# Patient Record
Sex: FEMALE | Race: WHITE | NOT HISPANIC OR LATINO | Employment: OTHER | URBAN - NONMETROPOLITAN AREA
[De-identification: names, ages, dates, MRNs, and addresses within clinical notes are randomized per-mention and may not be internally consistent; named-entity substitution may affect disease eponyms.]

---

## 2022-02-07 ENCOUNTER — APPOINTMENT (EMERGENCY)
Dept: RADIOLOGY | Facility: HOSPITAL | Age: 87
DRG: 092 | End: 2022-02-07
Payer: COMMERCIAL

## 2022-02-07 ENCOUNTER — HOSPITAL ENCOUNTER (INPATIENT)
Facility: HOSPITAL | Age: 87
LOS: 5 days | Discharge: NURSING, REHAB OR LONG-TERM CARE | DRG: 092 | End: 2022-02-12
Attending: EMERGENCY MEDICINE | Admitting: STUDENT IN AN ORGANIZED HEALTH CARE EDUCATION/TRAINING PROGRAM
Payer: COMMERCIAL

## 2022-02-07 ENCOUNTER — APPOINTMENT (INPATIENT)
Dept: CT IMAGING | Facility: HOSPITAL | Age: 87
DRG: 092 | End: 2022-02-07
Payer: COMMERCIAL

## 2022-02-07 DIAGNOSIS — R26.2 AMBULATORY DYSFUNCTION: ICD-10-CM

## 2022-02-07 DIAGNOSIS — K21.9 GERD (GASTROESOPHAGEAL REFLUX DISEASE): ICD-10-CM

## 2022-02-07 DIAGNOSIS — J32.9 SINUSITIS: ICD-10-CM

## 2022-02-07 DIAGNOSIS — D50.8 IRON DEFICIENCY ANEMIA SECONDARY TO INADEQUATE DIETARY IRON INTAKE: ICD-10-CM

## 2022-02-07 DIAGNOSIS — M17.11 OSTEOARTHRITIS OF RIGHT KNEE, UNSPECIFIED OSTEOARTHRITIS TYPE: ICD-10-CM

## 2022-02-07 DIAGNOSIS — R29.6 FREQUENT FALLS: Primary | ICD-10-CM

## 2022-02-07 DIAGNOSIS — E83.52 HYPERCALCEMIA: ICD-10-CM

## 2022-02-07 DIAGNOSIS — N39.0 UTI (URINARY TRACT INFECTION): ICD-10-CM

## 2022-02-07 DIAGNOSIS — D64.9 ANEMIA: ICD-10-CM

## 2022-02-07 PROBLEM — R27.0 ATAXIA: Status: ACTIVE | Noted: 2022-02-07

## 2022-02-07 PROBLEM — M48.00 SPINAL STENOSIS: Status: ACTIVE | Noted: 2022-02-07

## 2022-02-07 PROBLEM — F41.9 ANXIETY: Status: ACTIVE | Noted: 2022-02-07

## 2022-02-07 PROBLEM — G40.909 SEIZURE DISORDER (HCC): Status: ACTIVE | Noted: 2022-02-07

## 2022-02-07 PROBLEM — E87.1 HYPONATREMIA: Status: ACTIVE | Noted: 2022-02-07

## 2022-02-07 PROBLEM — D50.9 IRON DEFICIENCY ANEMIA: Status: ACTIVE | Noted: 2022-02-07

## 2022-02-07 LAB
2HR DELTA HS TROPONIN: 0 NG/L
ALBUMIN SERPL BCP-MCNC: 2.5 G/DL (ref 3.5–5)
ALP SERPL-CCNC: 105 U/L (ref 46–116)
ALT SERPL W P-5'-P-CCNC: 19 U/L (ref 12–78)
ANION GAP SERPL CALCULATED.3IONS-SCNC: 9 MMOL/L (ref 4–13)
AST SERPL W P-5'-P-CCNC: 31 U/L (ref 5–45)
BACTERIA UR QL AUTO: ABNORMAL /HPF
BASOPHILS # BLD AUTO: 0.03 THOUSANDS/ÂΜL (ref 0–0.1)
BASOPHILS NFR BLD AUTO: 0 % (ref 0–1)
BILIRUB SERPL-MCNC: 0.27 MG/DL (ref 0.2–1)
BILIRUB UR QL STRIP: NEGATIVE
BUN SERPL-MCNC: 18 MG/DL (ref 5–25)
CALCIUM ALBUM COR SERPL-MCNC: 12.1 MG/DL (ref 8.3–10.1)
CALCIUM SERPL-MCNC: 10.9 MG/DL (ref 8.3–10.1)
CARDIAC TROPONIN I PNL SERPL HS: 3 NG/L
CARDIAC TROPONIN I PNL SERPL HS: 3 NG/L
CHLORIDE SERPL-SCNC: 98 MMOL/L (ref 100–108)
CK SERPL-CCNC: 81 U/L (ref 26–192)
CLARITY UR: ABNORMAL
CO2 SERPL-SCNC: 25 MMOL/L (ref 21–32)
COLOR UR: YELLOW
CREAT SERPL-MCNC: 1.09 MG/DL (ref 0.6–1.3)
EOSINOPHIL # BLD AUTO: 0.08 THOUSAND/ÂΜL (ref 0–0.61)
EOSINOPHIL NFR BLD AUTO: 1 % (ref 0–6)
ERYTHROCYTE [DISTWIDTH] IN BLOOD BY AUTOMATED COUNT: 18.7 % (ref 11.6–15.1)
FLUAV RNA RESP QL NAA+PROBE: NEGATIVE
FLUBV RNA RESP QL NAA+PROBE: NEGATIVE
GFR SERPL CREATININE-BSD FRML MDRD: 44 ML/MIN/1.73SQ M
GLUCOSE SERPL-MCNC: 111 MG/DL (ref 65–140)
GLUCOSE UR STRIP-MCNC: NEGATIVE MG/DL
HCT VFR BLD AUTO: 28.1 % (ref 34.8–46.1)
HGB BLD-MCNC: 7.8 G/DL (ref 11.5–15.4)
HGB UR QL STRIP.AUTO: ABNORMAL
HYALINE CASTS #/AREA URNS LPF: ABNORMAL /LPF
IMM GRANULOCYTES # BLD AUTO: 0.08 THOUSAND/UL (ref 0–0.2)
IMM GRANULOCYTES NFR BLD AUTO: 1 % (ref 0–2)
INR PPP: 1.13 (ref 0.84–1.19)
KETONES UR STRIP-MCNC: NEGATIVE MG/DL
LACTATE SERPL-SCNC: 1.1 MMOL/L (ref 0.5–2)
LEUKOCYTE ESTERASE UR QL STRIP: ABNORMAL
LIPASE SERPL-CCNC: 88 U/L (ref 73–393)
LYMPHOCYTES # BLD AUTO: 2.07 THOUSANDS/ÂΜL (ref 0.6–4.47)
LYMPHOCYTES NFR BLD AUTO: 21 % (ref 14–44)
MAGNESIUM SERPL-MCNC: 2.1 MG/DL (ref 1.6–2.6)
MCH RBC QN AUTO: 19.3 PG (ref 26.8–34.3)
MCHC RBC AUTO-ENTMCNC: 27.8 G/DL (ref 31.4–37.4)
MCV RBC AUTO: 70 FL (ref 82–98)
MONOCYTES # BLD AUTO: 0.78 THOUSAND/ÂΜL (ref 0.17–1.22)
MONOCYTES NFR BLD AUTO: 8 % (ref 4–12)
MUCOUS THREADS UR QL AUTO: ABNORMAL
NEUTROPHILS # BLD AUTO: 6.68 THOUSANDS/ÂΜL (ref 1.85–7.62)
NEUTS SEG NFR BLD AUTO: 69 % (ref 43–75)
NITRITE UR QL STRIP: NEGATIVE
NON-SQ EPI CELLS URNS QL MICRO: ABNORMAL /HPF
NRBC BLD AUTO-RTO: 0 /100 WBCS
OTHER STN SPEC: ABNORMAL
PH UR STRIP.AUTO: 6 [PH]
PLATELET # BLD AUTO: 663 THOUSANDS/UL (ref 149–390)
PMV BLD AUTO: 10.1 FL (ref 8.9–12.7)
POTASSIUM SERPL-SCNC: 4.4 MMOL/L (ref 3.5–5.3)
PROT SERPL-MCNC: 8 G/DL (ref 6.4–8.2)
PROT UR STRIP-MCNC: NEGATIVE MG/DL
PROTHROMBIN TIME: 14.4 SECONDS (ref 11.6–14.5)
RBC # BLD AUTO: 4.04 MILLION/UL (ref 3.81–5.12)
RBC #/AREA URNS AUTO: ABNORMAL /HPF
RSV RNA RESP QL NAA+PROBE: NEGATIVE
SARS-COV-2 RNA RESP QL NAA+PROBE: NEGATIVE
SODIUM SERPL-SCNC: 132 MMOL/L (ref 136–145)
SP GR UR STRIP.AUTO: 1.02 (ref 1–1.03)
TSH SERPL DL<=0.05 MIU/L-ACNC: 1.64 UIU/ML (ref 0.36–3.74)
URINE COMMENT: ABNORMAL
UROBILINOGEN UR QL STRIP.AUTO: 0.2 E.U./DL
WBC # BLD AUTO: 9.72 THOUSAND/UL (ref 4.31–10.16)
WBC #/AREA URNS AUTO: ABNORMAL /HPF

## 2022-02-07 PROCEDURE — 96365 THER/PROPH/DIAG IV INF INIT: CPT

## 2022-02-07 PROCEDURE — 99223 1ST HOSP IP/OBS HIGH 75: CPT | Performed by: STUDENT IN AN ORGANIZED HEALTH CARE EDUCATION/TRAINING PROGRAM

## 2022-02-07 PROCEDURE — 83735 ASSAY OF MAGNESIUM: CPT | Performed by: EMERGENCY MEDICINE

## 2022-02-07 PROCEDURE — G1004 CDSM NDSC: HCPCS

## 2022-02-07 PROCEDURE — 81001 URINALYSIS AUTO W/SCOPE: CPT | Performed by: EMERGENCY MEDICINE

## 2022-02-07 PROCEDURE — 73502 X-RAY EXAM HIP UNI 2-3 VIEWS: CPT

## 2022-02-07 PROCEDURE — 96361 HYDRATE IV INFUSION ADD-ON: CPT

## 2022-02-07 PROCEDURE — C9113 INJ PANTOPRAZOLE SODIUM, VIA: HCPCS | Performed by: STUDENT IN AN ORGANIZED HEALTH CARE EDUCATION/TRAINING PROGRAM

## 2022-02-07 PROCEDURE — 82340 ASSAY OF CALCIUM IN URINE: CPT | Performed by: STUDENT IN AN ORGANIZED HEALTH CARE EDUCATION/TRAINING PROGRAM

## 2022-02-07 PROCEDURE — 0241U HB NFCT DS VIR RESP RNA 4 TRGT: CPT | Performed by: EMERGENCY MEDICINE

## 2022-02-07 PROCEDURE — 85025 COMPLETE CBC W/AUTO DIFF WBC: CPT | Performed by: EMERGENCY MEDICINE

## 2022-02-07 PROCEDURE — 87086 URINE CULTURE/COLONY COUNT: CPT | Performed by: EMERGENCY MEDICINE

## 2022-02-07 PROCEDURE — 80053 COMPREHEN METABOLIC PANEL: CPT | Performed by: EMERGENCY MEDICINE

## 2022-02-07 PROCEDURE — 99285 EMERGENCY DEPT VISIT HI MDM: CPT | Performed by: EMERGENCY MEDICINE

## 2022-02-07 PROCEDURE — 99285 EMERGENCY DEPT VISIT HI MDM: CPT

## 2022-02-07 PROCEDURE — 83605 ASSAY OF LACTIC ACID: CPT | Performed by: EMERGENCY MEDICINE

## 2022-02-07 PROCEDURE — 36415 COLL VENOUS BLD VENIPUNCTURE: CPT | Performed by: EMERGENCY MEDICINE

## 2022-02-07 PROCEDURE — 85610 PROTHROMBIN TIME: CPT | Performed by: EMERGENCY MEDICINE

## 2022-02-07 PROCEDURE — 84443 ASSAY THYROID STIM HORMONE: CPT | Performed by: EMERGENCY MEDICINE

## 2022-02-07 PROCEDURE — 87040 BLOOD CULTURE FOR BACTERIA: CPT | Performed by: EMERGENCY MEDICINE

## 2022-02-07 PROCEDURE — 70450 CT HEAD/BRAIN W/O DYE: CPT

## 2022-02-07 PROCEDURE — 72100 X-RAY EXAM L-S SPINE 2/3 VWS: CPT

## 2022-02-07 PROCEDURE — 93005 ELECTROCARDIOGRAM TRACING: CPT

## 2022-02-07 PROCEDURE — 84484 ASSAY OF TROPONIN QUANT: CPT | Performed by: EMERGENCY MEDICINE

## 2022-02-07 PROCEDURE — 71045 X-RAY EXAM CHEST 1 VIEW: CPT

## 2022-02-07 PROCEDURE — 82550 ASSAY OF CK (CPK): CPT | Performed by: EMERGENCY MEDICINE

## 2022-02-07 PROCEDURE — 83690 ASSAY OF LIPASE: CPT | Performed by: EMERGENCY MEDICINE

## 2022-02-07 PROCEDURE — 73564 X-RAY EXAM KNEE 4 OR MORE: CPT

## 2022-02-07 RX ORDER — CEFTRIAXONE 1 G/50ML
1000 INJECTION, SOLUTION INTRAVENOUS ONCE
Status: COMPLETED | OUTPATIENT
Start: 2022-02-07 | End: 2022-02-07

## 2022-02-07 RX ORDER — ERGOCALCIFEROL 1.25 MG/1
50000 CAPSULE ORAL WEEKLY
COMMUNITY
End: 2022-02-12 | Stop reason: HOSPADM

## 2022-02-07 RX ORDER — PANTOPRAZOLE SODIUM 40 MG/1
40 TABLET, DELAYED RELEASE ORAL
Status: DISCONTINUED | OUTPATIENT
Start: 2022-02-08 | End: 2022-02-12 | Stop reason: HOSPADM

## 2022-02-07 RX ORDER — TRAMADOL HYDROCHLORIDE 50 MG/1
50 TABLET ORAL EVERY 12 HOURS PRN
COMMUNITY
End: 2022-03-20 | Stop reason: HOSPADM

## 2022-02-07 RX ORDER — SODIUM CHLORIDE 9 MG/ML
3 INJECTION, SOLUTION INTRAMUSCULAR; INTRAVENOUS; SUBCUTANEOUS
Status: DISCONTINUED | OUTPATIENT
Start: 2022-02-07 | End: 2022-02-12 | Stop reason: HOSPADM

## 2022-02-07 RX ORDER — LORAZEPAM 0.5 MG/1
0.5 TABLET ORAL EVERY 8 HOURS PRN
Status: ON HOLD | COMMUNITY
End: 2022-03-20 | Stop reason: SDUPTHER

## 2022-02-07 RX ORDER — FERROUS SULFATE 325(65) MG
325 TABLET ORAL
Status: DISCONTINUED | OUTPATIENT
Start: 2022-02-08 | End: 2022-02-11

## 2022-02-07 RX ORDER — LISINOPRIL 10 MG/1
20 TABLET ORAL DAILY
Status: DISCONTINUED | OUTPATIENT
Start: 2022-02-08 | End: 2022-02-12 | Stop reason: HOSPADM

## 2022-02-07 RX ORDER — LEVETIRACETAM 500 MG/1
500 TABLET ORAL
COMMUNITY

## 2022-02-07 RX ORDER — LISINOPRIL 20 MG/1
20 TABLET ORAL DAILY
COMMUNITY

## 2022-02-07 RX ORDER — CEFTRIAXONE 1 G/50ML
1000 INJECTION, SOLUTION INTRAVENOUS EVERY 24 HOURS
Status: DISCONTINUED | OUTPATIENT
Start: 2022-02-08 | End: 2022-02-10

## 2022-02-07 RX ORDER — SENNOSIDES 8.6 MG
1 TABLET ORAL DAILY
Status: DISCONTINUED | OUTPATIENT
Start: 2022-02-08 | End: 2022-02-10

## 2022-02-07 RX ORDER — LORAZEPAM 0.5 MG/1
0.5 TABLET ORAL EVERY 8 HOURS PRN
Status: DISCONTINUED | OUTPATIENT
Start: 2022-02-07 | End: 2022-02-12 | Stop reason: HOSPADM

## 2022-02-07 RX ORDER — ACETAMINOPHEN 325 MG/1
650 TABLET ORAL EVERY 6 HOURS PRN
Status: DISCONTINUED | OUTPATIENT
Start: 2022-02-07 | End: 2022-02-12 | Stop reason: HOSPADM

## 2022-02-07 RX ORDER — LEVETIRACETAM 500 MG/1
500 TABLET ORAL DAILY
Status: DISCONTINUED | OUTPATIENT
Start: 2022-02-08 | End: 2022-02-12 | Stop reason: HOSPADM

## 2022-02-07 RX ORDER — TRAMADOL HYDROCHLORIDE 50 MG/1
50 TABLET ORAL EVERY 6 HOURS PRN
Status: DISCONTINUED | OUTPATIENT
Start: 2022-02-07 | End: 2022-02-07

## 2022-02-07 RX ORDER — PANTOPRAZOLE SODIUM 40 MG/10ML
40 INJECTION, POWDER, LYOPHILIZED, FOR SOLUTION INTRAVENOUS ONCE
Status: COMPLETED | OUTPATIENT
Start: 2022-02-07 | End: 2022-02-07

## 2022-02-07 RX ORDER — FERROUS SULFATE 325(65) MG
325 TABLET ORAL
Status: ON HOLD | COMMUNITY
End: 2022-02-11 | Stop reason: SDUPTHER

## 2022-02-07 RX ORDER — SODIUM CHLORIDE 9 MG/ML
100 INJECTION, SOLUTION INTRAVENOUS CONTINUOUS
Status: DISCONTINUED | OUTPATIENT
Start: 2022-02-07 | End: 2022-02-08

## 2022-02-07 RX ORDER — HEPARIN SODIUM 5000 [USP'U]/ML
5000 INJECTION, SOLUTION INTRAVENOUS; SUBCUTANEOUS EVERY 8 HOURS SCHEDULED
Status: DISCONTINUED | OUTPATIENT
Start: 2022-02-07 | End: 2022-02-07

## 2022-02-07 RX ORDER — LORAZEPAM 0.5 MG/1
0.5 TABLET ORAL EVERY 8 HOURS PRN
Status: DISCONTINUED | OUTPATIENT
Start: 2022-02-07 | End: 2022-02-07

## 2022-02-07 RX ORDER — LANOLIN ALCOHOL/MO/W.PET/CERES
1 CREAM (GRAM) TOPICAL 2 TIMES DAILY
COMMUNITY

## 2022-02-07 RX ORDER — TRAMADOL HYDROCHLORIDE 50 MG/1
50 TABLET ORAL EVERY 6 HOURS PRN
Status: DISCONTINUED | OUTPATIENT
Start: 2022-02-07 | End: 2022-02-12 | Stop reason: HOSPADM

## 2022-02-07 RX ADMIN — SODIUM CHLORIDE 100 ML/HR: 0.9 INJECTION, SOLUTION INTRAVENOUS at 20:24

## 2022-02-07 RX ADMIN — CEFTRIAXONE 1000 MG: 1 INJECTION, SOLUTION INTRAVENOUS at 15:38

## 2022-02-07 RX ADMIN — TRAMADOL HYDROCHLORIDE 50 MG: 50 TABLET, FILM COATED ORAL at 20:22

## 2022-02-07 RX ADMIN — LORAZEPAM 0.5 MG: 0.5 TABLET ORAL at 21:00

## 2022-02-07 RX ADMIN — SODIUM CHLORIDE 500 ML: 0.9 INJECTION, SOLUTION INTRAVENOUS at 13:27

## 2022-02-07 RX ADMIN — PANTOPRAZOLE SODIUM 40 MG: 40 INJECTION, POWDER, FOR SOLUTION INTRAVENOUS at 20:55

## 2022-02-07 RX ADMIN — SODIUM CHLORIDE 1000 ML: 0.9 INJECTION, SOLUTION INTRAVENOUS at 16:30

## 2022-02-07 NOTE — ED PROVIDER NOTES
History  Chief Complaint   Patient presents with    Fall     Pt had multiple falls at home  Pt denies blood thinners or LOC      59-year-old female with a past medical history of hypertension, anemia presents with right hip, right buttocks and right knee pain status post fall at home 30 minutes prior to ED arrival   Patient arrives via EMS who states that patient lives alone  Her son is in the process of trying to find placement for patient as she has been having multiple falls at home recently with ambulatory dysfunction  Last fall was one week ago  Patient states that she wears a brace on her right knee to help her walk better  Today, she got up to go make tea in the kitchen and lost her balance causing her to fall backwards onto her right buttock and right hip  Patient was unable to get herself upright and was crawling to a chair when EMS arrived  Patient was not on the ground for very long per patient  Patient denies focal motor sensory deficit, headache, neck pain, chest pain, abdominal pain  Patient is complaining of right hip, right low back and knee pain but admits that she has a history of chronic right lower extremity pain  Patient denies prodromal symptoms prior to fall  No loss of consciousness  Patient is not on blood thinners  Patient has had her COVID-19 vaccinations and booster         History provided by:  Patient, medical records and EMS personnel   used: No    Fall  Mechanism of injury: fall    Injury location:  Pelvis  Pelvic injury location:  R hip  Incident location:  Home  Time since incident:  30 minutes  Arrived directly from scene: yes    Fall:     Fall occurred:  Standing    Height of fall:  GLF    Impact surface:  Hard floor    Point of impact:  Buttocks    Entrapped after fall: no    Suspicion of alcohol use: no    Suspicion of drug use: no    Tetanus status:  Unknown  Prior to arrival data:     Bystander interventions:  None    Patient ambulatory at scene: no      Blood loss:  None    Responsiveness at scene:  Alert    Orientation at scene:  Person, place, situation and time    Loss of consciousness: no      Amnesic to event: no      Airway interventions:  None    Breathing interventions:  None    IV access status:  None    IO access:  None    Fluids administered:  None    Cardiac interventions:  None    Medications administered:  None    Immobilization:  None    Airway condition since incident:  Stable    Breathing condition since incident:  Stable    Circulation condition since incident:  Stable    Mental status condition since incident:  Stable    Disability condition since incident:  Stable  Associated symptoms: no abdominal pain, no back pain, no blindness, no chest pain, no difficulty breathing, no headaches, no hearing loss, no loss of consciousness, no nausea, no seizures and no vomiting    Risk factors: no AICD, no anticoagulation therapy, no asthma, no beta blocker therapy, no CABG, no CAD, no CHF, no COPD, no diabetes, no dialysis, no hemophilia, no kidney disease, no pacemaker, no past MI and no steroid use        Prior to Admission Medications   Prescriptions Last Dose Informant Patient Reported? Taking?    LORazepam (ATIVAN) 0 5 mg tablet Unknown at Unknown time  Yes No   Sig: Take 0 5 mg by mouth every 8 (eight) hours as needed for anxiety   ergocalciferol (ERGOCALCIFEROL) 1 25 MG (18490 UT) capsule Unknown at Unknown time  Yes No   Sig: Take 50,000 Units by mouth once a week   ferrous sulfate 325 (65 Fe) mg tablet Unknown at Unknown time  Yes No   Sig: Take 325 mg by mouth daily with breakfast   glucosamine-chondroitin 500-400 MG tablet Unknown at Unknown time  Yes No   Sig: Take 1 tablet by mouth 2 (two) times a day   levETIRAcetam (KEPPRA) 500 mg tablet Unknown at Unknown time  Yes No   Sig: Take 500 mg by mouth daily   lisinopril (ZESTRIL) 20 mg tablet Unknown at Unknown time  Yes No   Sig: Take 20 mg by mouth daily   traMADol (ULTRAM) 50 mg tablet Unknown at Unknown time  Yes No   Sig: Take 50 mg by mouth every 6 (six) hours as needed for moderate pain      Facility-Administered Medications: None       Past Medical History:   Diagnosis Date    Arthritis     Hypertension        Past Surgical History:   Procedure Laterality Date    TONSILLECTOMY         History reviewed  No pertinent family history  I have reviewed and agree with the history as documented  E-Cigarette/Vaping    E-Cigarette Use Never User      E-Cigarette/Vaping Substances     Social History     Tobacco Use    Smoking status: Never Smoker    Smokeless tobacco: Never Used   Vaping Use    Vaping Use: Never used   Substance Use Topics    Alcohol use: Never    Drug use: Never       Review of Systems   Constitutional: Negative  HENT: Negative  Negative for hearing loss  Eyes: Negative  Negative for blindness  Respiratory: Negative  Negative for cough and shortness of breath  Cardiovascular: Negative  Negative for chest pain  Gastrointestinal: Negative  Negative for abdominal pain, nausea and vomiting  Endocrine: Negative  Genitourinary: Negative  Negative for difficulty urinating  Musculoskeletal: Positive for arthralgias and gait problem  Negative for back pain, joint swelling, myalgias and neck stiffness  Skin: Negative  Negative for color change and wound  Allergic/Immunologic: Negative  Neurological: Negative for seizures, loss of consciousness and headaches  Hematological: Negative  Psychiatric/Behavioral: Negative  All other systems reviewed and are negative  Physical Exam  Physical Exam  Vitals and nursing note reviewed  Exam conducted with a chaperone present  Constitutional:       General: She is not in acute distress  Appearance: Normal appearance  She is well-developed and normal weight  She is not ill-appearing, toxic-appearing or diaphoretic        Comments: Frail and elderly, in no acute distress   HENT:      Head: Normocephalic and atraumatic  Jaw: There is normal jaw occlusion  Right Ear: Hearing, tympanic membrane, ear canal and external ear normal  There is no impacted cerumen  No mastoid tenderness  No hemotympanum  Left Ear: Hearing, tympanic membrane, ear canal and external ear normal  There is no impacted cerumen  No mastoid tenderness  No hemotympanum  Nose: Nose normal       Right Nostril: No epistaxis  Left Nostril: No epistaxis  Right Sinus: No maxillary sinus tenderness or frontal sinus tenderness  Left Sinus: No maxillary sinus tenderness or frontal sinus tenderness  Mouth/Throat:      Lips: Pink  No lesions  Mouth: Mucous membranes are moist  No lacerations or angioedema  Tongue: No lesions  Tongue does not deviate from midline  Palate: No mass and lesions  Pharynx: Oropharynx is clear  Uvula midline  No pharyngeal swelling, oropharyngeal exudate, posterior oropharyngeal erythema or uvula swelling  Tonsils: No tonsillar exudate or tonsillar abscesses  Eyes:      General: Lids are normal  Vision grossly intact  Gaze aligned appropriately  No visual field deficit or scleral icterus  Right eye: No discharge  Left eye: No discharge  Extraocular Movements: Extraocular movements intact  Right eye: No nystagmus  Left eye: No nystagmus  Conjunctiva/sclera: Conjunctivae normal       Right eye: Right conjunctiva is not injected  Left eye: Left conjunctiva is not injected  Pupils: Pupils are equal, round, and reactive to light  Neck:      Thyroid: No thyroid mass or thyromegaly  Trachea: Trachea and phonation normal    Cardiovascular:      Rate and Rhythm: Normal rate and regular rhythm  Pulses: Normal pulses  Radial pulses are 2+ on the right side and 2+ on the left side  Dorsalis pedis pulses are 2+ on the right side and 2+ on the left side        Heart sounds: Normal heart sounds, S1 normal and S2 normal    Pulmonary:      Effort: Pulmonary effort is normal  No tachypnea, accessory muscle usage, respiratory distress or retractions  Breath sounds: Normal breath sounds and air entry  No stridor or decreased air movement  No decreased breath sounds, wheezing, rhonchi or rales  Chest:      Chest wall: No tenderness  Abdominal:      General: Abdomen is flat  Bowel sounds are normal  There is no distension  Palpations: Abdomen is soft  Abdomen is not rigid  There is no mass  Tenderness: There is no abdominal tenderness  There is no right CVA tenderness, left CVA tenderness, guarding or rebound  Negative signs include Davalos's sign and McBurney's sign  Hernia: No hernia is present  Musculoskeletal:         General: Tenderness present  No swelling, deformity or signs of injury  Cervical back: Normal, full passive range of motion without pain, normal range of motion and neck supple  No spinous process tenderness or muscular tenderness  Thoracic back: Normal       Lumbar back: Tenderness present  No swelling, edema, deformity, signs of trauma, lacerations, spasms or bony tenderness  Normal range of motion  Positive right straight leg raise test  Negative left straight leg raise test  No scoliosis  Back:       Right hip: Tenderness present  No deformity, lacerations, bony tenderness or crepitus  Normal range of motion  Normal strength  Left hip: Normal       Right upper leg: Normal       Left upper leg: Normal       Right knee: No swelling, deformity, effusion, erythema, ecchymosis, lacerations, bony tenderness or crepitus  Normal range of motion  Tenderness present over the patellar tendon  Normal pulse  Instability Tests: Anterior drawer test negative  Posterior drawer test negative  Left knee: Normal       Right lower leg: Normal  No edema  Left lower leg: Normal  No edema        Right ankle: Normal       Right Achilles Tendon: Normal  Left ankle: Normal       Left Achilles Tendon: Normal       Right foot: Normal       Left foot: Normal       Comments: Point tenderness along right sacroiliac joint space; no gross deformity of right lower extremity; full range of motion; motor and sensation intact; no bruising or ecchymosis; point tenderness along right lateral hip and right patella with no swelling; positive right straight leg test    Lymphadenopathy:      Cervical: No cervical adenopathy  Skin:     General: Skin is warm and dry  Capillary Refill: Capillary refill takes less than 2 seconds  Coloration: Skin is not ashen, cyanotic, jaundiced, mottled, pale or sallow  Findings: No abrasion, abscess, acne, bruising, burn, ecchymosis, erythema, signs of injury, laceration, lesion, petechiae, rash or wound  Rash is not macular or papular  Comments: Small abrasion to left knee   Neurological:      General: No focal deficit present  Mental Status: She is alert and oriented to person, place, and time  Mental status is at baseline  She is not disoriented  GCS: GCS eye subscore is 4  GCS verbal subscore is 5  GCS motor subscore is 6  Cranial Nerves: Cranial nerves are intact  No cranial nerve deficit, dysarthria or facial asymmetry  Sensory: Sensation is intact  No sensory deficit  Motor: Motor function is intact  No weakness, tremor, atrophy, abnormal muscle tone or seizure activity  Coordination: Coordination is intact  Coordination normal       Gait: Gait is intact  Gait normal       Comments: Patient is AAOx4, GCS 15; speaking clearly and appropriately; motor and sensation intact; visual fields intact; cranial nerves II-XII grossly intact; no facial droop, slurred speech or arm drift   Psychiatric:         Attention and Perception: Attention and perception normal  She is attentive           Mood and Affect: Mood and affect normal          Speech: Speech normal          Behavior: Behavior normal  Behavior is cooperative  Thought Content:  Thought content normal          Cognition and Memory: Cognition and memory normal          Judgment: Judgment normal          Vital Signs  ED Triage Vitals [02/07/22 1256]   Temperature Pulse Respirations Blood Pressure SpO2   98 4 °F (36 9 °C) 88 16 146/68 99 %      Temp Source Heart Rate Source Patient Position - Orthostatic VS BP Location FiO2 (%)   Temporal Monitor Sitting Right arm --      Pain Score       No Pain           Vitals:    02/07/22 1256 02/07/22 1530 02/07/22 1853 02/08/22 0708   BP: 146/68 142/79 (!) 184/84 129/60   Pulse: 88 87 (!) 106 87   Patient Position - Orthostatic VS: Sitting Sitting           Visual Acuity  Visual Acuity      Most Recent Value   L Pupil Size (mm) 3   R Pupil Size (mm) 3          ED Medications  Medications   sodium chloride (PF) 0 9 % injection 3 mL (has no administration in time range)   sodium chloride 0 9 % infusion (100 mL/hr Intravenous New Bag 2/7/22 2024)   acetaminophen (TYLENOL) tablet 650 mg (has no administration in time range)   senna (SENOKOT) tablet 8 6 mg ( Oral Canceled Entry 2/8/22 0900)   cefTRIAXone (ROCEPHIN) IVPB (premix in dextrose) 1,000 mg 50 mL (has no administration in time range)   pantoprazole (PROTONIX) EC tablet 40 mg (40 mg Oral Given 2/8/22 0602)   ferrous sulfate tablet 325 mg (325 mg Oral Given 2/8/22 0744)   levETIRAcetam (KEPPRA) tablet 500 mg ( Oral Canceled Entry 2/8/22 0900)   lisinopril (ZESTRIL) tablet 20 mg ( Oral Canceled Entry 2/8/22 0900)   LORazepam (ATIVAN) tablet 0 5 mg (0 5 mg Oral Given 2/7/22 2100)   traMADol (ULTRAM) tablet 50 mg (50 mg Oral Given 2/8/22 0656)   sodium chloride 0 9 % bolus 500 mL (0 mL Intravenous Stopped 2/7/22 1537)   cefTRIAXone (ROCEPHIN) IVPB (premix in dextrose) 1,000 mg 50 mL (0 mg Intravenous Stopped 2/7/22 1615)   sodium chloride 0 9 % bolus 1,000 mL (1,000 mL Intravenous New Bag 2/7/22 0446)   pantoprazole (PROTONIX) injection 40 mg (40 mg Intravenous Given 2/7/22 2055)       Diagnostic Studies  Results Reviewed     Procedure Component Value Units Date/Time    Blood culture #1 [[id number]] Collected: 02/07/22 1537    Lab Status: Preliminary result Specimen: Blood from Arm, Right Updated: 02/08/22 0801     Blood Culture Received in Microbiology Lab  Culture in Progress      Comprehensive metabolic panel [[id number]]  (Abnormal) Collected: 02/08/22 0446    Lab Status: Final result Specimen: Blood from Arm, Right Updated: 02/08/22 0511     Sodium 132 mmol/L      Potassium 4 1 mmol/L      Chloride 100 mmol/L      CO2 25 mmol/L      ANION GAP 7 mmol/L      BUN 14 mg/dL      Creatinine 1 03 mg/dL      Glucose 122 mg/dL      Calcium 9 8 mg/dL      Corrected Calcium 11 2 mg/dL      AST 19 U/L      ALT 18 U/L      Alkaline Phosphatase 97 U/L      Total Protein 7 1 g/dL      Albumin 2 2 g/dL      Total Bilirubin 0 24 mg/dL      eGFR 47 ml/min/1 73sq m     Narrative:      [place] guidelines for Chronic Kidney Disease (CKD):     Stage 1 with normal or high GFR (GFR > 90 mL/min/1 73 square meters)    Stage 2 Mild CKD (GFR = 60-89 mL/min/1 73 square meters)    Stage 3A Moderate CKD (GFR = 45-59 mL/min/1 73 square meters)    Stage 3B Moderate CKD (GFR = 30-44 mL/min/1 73 square meters)    Stage 4 Severe CKD (GFR = 15-29 mL/min/1 73 square meters)    Stage 5 End Stage CKD (GFR <15 mL/min/1 73 square meters)  Note: GFR calculation is accurate only with a steady state creatinine    Magnesium [[id number]]  (Normal) Collected: 02/08/22 0446    Lab Status: Final result Specimen: Blood from Arm, Right Updated: 02/08/22 0511     Magnesium 1 9 mg/dL     CBC and differential [[id number]]  (Abnormal) Collected: 02/08/22 0446    Lab Status: Final result Specimen: Blood from Arm, Right Updated: 02/08/22 0455     WBC 11 69 Thousand/uL      RBC 3 67 Million/uL      Hemoglobin 7 1 g/dL      Hematocrit 25 2 %      MCV 69 fL      MCH 19 3 pg      MCHC 28 2 g/dL      RDW 18 6 %      MPV 9 4 fL      Platelets 911 Thousands/uL      nRBC 0 /100 WBCs      Neutrophils Relative 60 %      Immat GRANS % 1 %      Lymphocytes Relative 28 %      Monocytes Relative 10 %      Eosinophils Relative 1 %      Basophils Relative 0 %      Neutrophils Absolute 7 13 Thousands/µL      Immature Grans Absolute 0 08 Thousand/uL      Lymphocytes Absolute 3 21 Thousands/µL      Monocytes Absolute 1 13 Thousand/µL      Eosinophils Absolute 0 11 Thousand/µL      Basophils Absolute 0 03 Thousands/µL     Iron Saturation % [[id number]]     Lab Status: No result Specimen: Blood     Ferritin [[id number]]     Lab Status: No result Specimen: Blood     PTH, intact [[id number]]     Lab Status: No result Specimen: Blood     PTH-related peptide [[id number]]     Lab Status: No result Specimen: Blood     Vitamin D 1,25 dihydroxy [[id number]]     Lab Status: No result Specimen: Blood     Vitamin D 25 hydroxy [[id number]]     Lab Status: No result Specimen: Blood     Calcium, urine, random [[id number]]     Lab Status: No result Specimen: Urine     HS Troponin I 2hr [[id number]]  (Normal) Collected: 02/07/22 1538    Lab Status: Final result Specimen: Blood from Arm, Left Updated: 02/07/22 1622     hs TnI 2hr 3 ng/L      Delta 2hr hsTnI 0 ng/L     Lactic acid, plasma [[id number]]  (Normal) Collected: 02/07/22 1538    Lab Status: Final result Specimen: Blood from Arm, Left Updated: 02/07/22 1621     LACTIC ACID 1 1 mmol/L     Narrative:      Result may be elevated if tourniquet was used during collection  Blood culture #2 [[id number]] Collected: 02/07/22 1537    Lab Status:  In process Specimen: Blood from Arm, Left Updated: 02/07/22 1550    HS Troponin I 4hr [[id number]]     Lab Status: No result Specimen: Blood     COVID/FLU/RSV [[id number]]  (Normal) Collected: 02/07/22 1320    Lab Status: Final result Specimen: Nares from Nose Updated: 02/07/22 1433     SARS-CoV-2 Negative     INFLUENZA A PCR Negative     INFLUENZA B PCR Negative     RSV PCR Negative    Narrative:      FOR PEDIATRIC PATIENTS - copy/paste COVID Guidelines URL to browser: [email or web address]    SARS-CoV-2 assay is a Nucleic Acid Amplification assay intended for the  qualitative detection of nucleic acid from SARS-CoV-2 in nasopharyngeal  swabs  Results are for the presumptive identification of SARS-CoV-2 RNA  Positive results are indicative of infection with SARS-CoV-2, the virus  causing COVID-19, but do not rule out bacterial infection or co-infection  with other viruses  Laboratories within the [place] and its  territories are required to report all positive results to the appropriate  public health authorities  Negative results do not preclude SARS-CoV-2  infection and should not be used as the sole basis for treatment or other  patient management decisions  Negative results must be combined with  clinical observations, patient history, and epidemiological information  This test has not been FDA cleared or approved  This test has been authorized by FDA under an Emergency Use Authorization  (EUA)  This test is only authorized for the duration of time the  declaration that circumstances exist justifying the authorization of the  emergency use of an in vitro diagnostic tests for detection of SARS-CoV-2  virus and/or diagnosis of COVID-19 infection under section 564(b)(1) of  the Act, 21 U  S C  453JAS-1(K)(9), unless the authorization is terminated  or revoked sooner  The test has been validated but independent review by FDA  and CLIA is pending  Test performed using [employer] GeneXpert: This RT-PCR assay targets N2,  a region unique to SARS-CoV-2   A conserved region in the E-gene was chosen  for pan-Sarbecovirus detection which includes SARS-CoV-2     TSH, 3rd generation [[id number]]  (Normal) Collected: 02/07/22 1320    Lab Status: Final result Specimen: Blood from Arm, Left Updated: 02/07/22 7770 [facility]mL     Narrative:      Patients undergoing fluorescein dye angiography may retain small amounts of fluorescein in the body for 48-72 hours post procedure  Samples containing fluorescein can produce falsely depressed TSH values  If the patient had this procedure,a specimen should be resubmitted post fluorescein clearance        HS Troponin 0hr (reflex protocol) [[id number]]  (Normal) Collected: 02/07/22 1320    Lab Status: Final result Specimen: Blood from Arm, Left Updated: 02/07/22 1400     hs TnI 0hr 3 ng/L     Comprehensive metabolic panel [[id number]]  (Abnormal) Collected: 02/07/22 1320    Lab Status: Final result Specimen: Blood from Arm, Left Updated: 02/07/22 1359     Sodium 132 mmol/L      Potassium 4 4 mmol/L      Chloride 98 mmol/L      CO2 25 mmol/L      ANION GAP 9 mmol/L      BUN 18 mg/dL      Creatinine 1 09 mg/dL      Glucose 111 mg/dL      Calcium 10 9 mg/dL      Corrected Calcium 12 1 mg/dL      AST 31 U/L      ALT 19 U/L      Alkaline Phosphatase 105 U/L      Total Protein 8 0 g/dL      Albumin 2 5 g/dL      Total Bilirubin 0 27 mg/dL      eGFR 44 ml/min/1 73sq m     Narrative:      [place] guidelines for Chronic Kidney Disease (CKD):     Stage 1 with normal or high GFR (GFR > 90 mL/min/1 73 square meters)    Stage 2 Mild CKD (GFR = 60-89 mL/min/1 73 square meters)    Stage 3A Moderate CKD (GFR = 45-59 mL/min/1 73 square meters)    Stage 3B Moderate CKD (GFR = 30-44 mL/min/1 73 square meters)    Stage 4 Severe CKD (GFR = 15-29 mL/min/1 73 square meters)    Stage 5 End Stage CKD (GFR <15 mL/min/1 73 square meters)  Note: GFR calculation is accurate only with a steady state creatinine    Lipase [[id number]]  (Normal) Collected: 02/07/22 1320    Lab Status: Final result Specimen: Blood from Arm, Left Updated: 02/07/22 1355     Lipase 88 u/L     Magnesium [[id number]]  (Normal) Collected: 02/07/22 1320    Lab Status: Final result Specimen: Blood from Arm, Left Updated: 02/07/22 1355     Magnesium 2 1 mg/dL     CK (with reflex to MB) [[id number]]  (Normal) Collected: 02/07/22 1320    Lab Status: Final result Specimen: Blood from Arm, Left Updated: 02/07/22 1352     Total CK 81 U/L     Urine Microscopic [[id number]]  (Abnormal) Collected: 02/07/22 1320    Lab Status: Final result Specimen: Urine, Clean Catch Updated: 02/07/22 1350     RBC, UA Innumerable /hpf      WBC, UA 10-20 /hpf      Epithelial Cells Moderate /hpf      Bacteria, UA Innumerable /hpf      Hyaline Casts, UA 0-1 /lpf      OTHER OBSERVATIONS Yeast Cells Present  Transitional Epithelial Cells     MUCUS THREADS Occasional     URINE COMMENT Yeast- Rare    Urine culture [[id number]] Collected: 02/07/22 1320    Lab Status:  In process Specimen: Urine, Clean Catch Updated: 02/07/22 1350    Protime-INR [[id number]]  (Normal) Collected: 02/07/22 1320    Lab Status: Final result Specimen: Blood from Arm, Left Updated: 02/07/22 1346     Protime 14 4 seconds      INR 1 13    UA w Reflex to Microscopic w Reflex to Culture [[id number]]  (Abnormal) Collected: 02/07/22 1320    Lab Status: Final result Specimen: Urine, Clean Catch Updated: 02/07/22 1336     Color, UA Yellow     Clarity, UA Slightly Cloudy     Specific Gravity, UA 1 025     pH, UA 6 0     Leukocytes, UA Moderate     Nitrite, UA Negative     Protein, UA Negative mg/dl      Glucose, UA Negative mg/dl      Ketones, UA Negative mg/dl      Urobilinogen, UA 0 2 E U /dl      Bilirubin, UA Negative     Blood, UA Large    CBC and differential [[id number]]  (Abnormal) Collected: 02/07/22 1320    Lab Status: Final result Specimen: Blood from Arm, Left Updated: 02/07/22 1334     WBC 9 72 Thousand/uL      RBC 4 04 Million/uL      Hemoglobin 7 8 g/dL      Hematocrit 28 1 %      MCV 70 fL      MCH 19 3 pg      MCHC 27 8 g/dL      RDW 18 7 %      MPV 10 1 fL      Platelets 553 Thousands/uL      nRBC 0 /100 WBCs      Neutrophils Relative 69 %      Immat GRANS % 1 %      Lymphocytes Relative 21 %      Monocytes Relative 8 %      Eosinophils Relative 1 %      Basophils Relative 0 %      Neutrophils Absolute 6 68 Thousands/µL      Immature Grans Absolute 0 08 Thousand/uL      Lymphocytes Absolute 2 07 Thousands/µL      Monocytes Absolute 0 78 Thousand/µL      Eosinophils Absolute 0 08 Thousand/µL      Basophils Absolute 0 03 Thousands/µL                  CT head without contrast   Final Result by Jaqueline Celaya MD (02/07 1706)      No acute intracranial abnormality  Acute left maxillary sinusitis  Workstation performed: [id number]         XR chest 1 view portable   Final Result by Mayito Cabello DO (02/07 1536)      No focal consolidation, pleural effusion, or pneumothorax  Workstation performed: [id number]         XR hip/pelv 2-3 vws right if performed   Final Result by Mayito Cabello DO (02/07 1533)      No acute osseous abnormality  Workstation performed: [id number]         XR knee 4+ vw right injury   Final Result by Mayito Cbaello DO (02/07 1602)      No acute osseous abnormality  Degenerative changes as described  Workstation performed: [id number]         XR spine lumbar 2 or 3 views injury   Final Result by Mayito Cabello DO (02/07 1603)      No acute osseous abnormality  Degenerative changes as described  Workstation performed: [id number]                    Procedures  ECG 12 Lead Documentation Only    Date/Time: 2/7/2022 1:20 PM  Performed by: Amado Sweeney MD  Authorized by:  Amado Sweeney MD     Indications / Diagnosis:  Fall  ECG reviewed by me, the ED Provider: yes    Patient location:  ED  Previous ECG:     Comparison to cardiac monitor: Yes    Rate:     ECG rate:  93bpm    ECG rate assessment: normal    Rhythm:     Rhythm: sinus rhythm    Ectopy:     Ectopy: none    QRS:     QRS axis:  Normal  Conduction:     Conduction: normal    ST segments:     ST segments:  Normal  T waves: T waves: flattening and inverted      Flattening:  AVL    Inverted:  AVR  Comments:      No STEMI  MS 170ms  QRS 60ms  QT 422ms    Cardiac monitoring ordered  Heart rate and rhythm as above  I have personally read and interpreted the EKG as above  ED Course  ED Course as of 02/08/22 0845   Mon Feb 07, 2022   1453 COVID-19/influenza negative   1558 CXR:IMPRESSION:     No focal consolidation, pleural effusion, or pneumothorax  1558 Right hip/pelvis xray:IMPRESSION:     No acute osseous abnormality      1558 Lumbar spine x-ray read and interpreted by me  No acute osseous abnormalities  1559 Right knee x-ray read interpreted by me  No acute osseous abnormality  26 Texted Dr Michelle Sterling, Hospitalist for [place] inpatient admission for UTI, frequent falls at home, ambulatory dysfunction, known anemia (no GIB) and hypercalcemia  Son wants social placement as patient lives alone     1615 Lumbar spine xray:IMPRESSION:     No acute osseous abnormality        Degenerative changes as described       1615 Right knee xray:IMPRESSION:     No acute osseous abnormality      Degenerative changes as described       1804 CTH:IMPRESSION:     No acute intracranial abnormality      Acute left maxillary sinusitis              MDM  Number of Diagnoses or Management Options     Amount and/or Complexity of Data Reviewed  Clinical lab tests: ordered and reviewed  Tests in the radiology section of CPT®: ordered and reviewed  Tests in the medicine section of CPT®: reviewed and ordered  Review and summarize past medical records: yes  Discuss the patient with other providers: yes (Hospitalist, Dr Phillip)  Independent visualization of images, tracings, or specimens: yes (X-rays, EKG)    Risk of Complications, Morbidity, and/or Mortality  Presenting problems: moderate  Diagnostic procedures: moderate  Management options: moderate    Patient Progress  Patient progress: stable      Disposition  Final diagnoses: Frequent falls   Ambulatory dysfunction   Osteoarthritis of right knee, unspecified osteoarthritis type   Hypercalcemia   Anemia   UTI (urinary tract infection)   Sinusitis     Time reflects when diagnosis was documented in both MDM as applicable and the Disposition within this note     Time User Action Codes Description Comment    2/7/2022  1:02 PM Mordecai Pickles Add [C45  FWTM] Fall, initial encounter     2/7/2022  1:03 PM Mordecai Pickles Add [R29 6] Frequent falls     2/7/2022  1:03 PM Mordecai Pickles Modify [R29 6] Frequent falls     2/7/2022  1:03 PM Mordecai Pickles Remove [K92  YGMW] Fall, initial encounter     2/7/2022  1:03 PM Mordecai Pickles Add [R26 2] Ambulatory dysfunction     2/7/2022  4:16 PM Mordecai Pickles Add [L10 35] Osteoarthritis of right knee, unspecified osteoarthritis type     2/7/2022  4:19 PM Mordecai Pickles Add [E83 52] Hypercalcemia     2/7/2022  4:19 PM Mordecai Pickles Add [D64 9] Anemia     2/7/2022  4:19 PM Mordecai Pickles Add [N39 0] UTI (urinary tract infection)     2/7/2022  6:05 PM Mordecai Pickles Add [J32 9] Sinusitis       ED Disposition     ED Disposition Condition Date/Time Comment    Admit Stable Mon Feb 7, 2022  4:19 PM Case was discussed with Dr Elodia Holman and the patient's admission status was agreed to be Admission Status: inpatient status to the service of Dr Elodia Holman          Follow-up Information    None         Current Discharge Medication List      CONTINUE these medications which have NOT CHANGED    Details   ergocalciferol (ERGOCALCIFEROL) 1 25 MG (03131 UT) capsule Take 50,000 Units by mouth once a week      ferrous sulfate 325 (65 Fe) mg tablet Take 325 mg by mouth daily with breakfast      glucosamine-chondroitin 500-400 MG tablet Take 1 tablet by mouth 2 (two) times a day      levETIRAcetam (KEPPRA) 500 mg tablet Take 500 mg by mouth daily      lisinopril (ZESTRIL) 20 mg tablet Take 20 mg by mouth daily LORazepam (ATIVAN) 0 5 mg tablet Take 0 5 mg by mouth every 8 (eight) hours as needed for anxiety      traMADol (ULTRAM) 50 mg tablet Take 50 mg by mouth every 6 (six) hours as needed for moderate pain             No discharge procedures on file      PDMP Review     None          ED Provider  Electronically Signed by    MD Denver Rico MD  02/08/22 3327

## 2022-02-07 NOTE — H&P
[id number] [facility] 2/6/1932, 80 y o  female MRN: [id number]  Unit/Bed#: -01 Encounter: [id number]  Primary Care Provider: Alessandra Chen MD   Date and time admitted to hospital: 2/7/2022 12:46 PM    * Ambulatory dysfunction  Assessment & Plan  · Falling more frequently at home (in the last 3 months), multiple calls to EMS  · Likely secondary to bad arthritis of bilateral knees  · PT and OT consulted for evaluation  · Consult to case management also placed for possible long-term placement  · xrays negative for any fractures    Acute cystitis with hematuria  Assessment & Plan  · UA positive  · Started on Ceftriaxone 1 g daily in the ED, continue   · Follow-up UCx     Hypercalcemia  Assessment & Plan  · Patient presents with Ca of 12 1, unclear etiology  · Obtain PTH, PTH-rp, V25(OH)D, 1,25 dihydroxyvitamin D levels  · Obtain urinary calcium level  · Continue IVF hydration for now and continue to monitor Ca    Hyponatremia  Assessment & Plan  · Presents with sodium of 132, likely secondary to poor po intake  · Continue IVF for now and monitor Na    Iron deficiency anemia  Assessment & Plan  · Baseline hgb is 7 2 (lowest it's been); last check on Jan was at 7 5  · Continue ferrous sulfate 325 mg daily  · Obtain iron panel  · No history of GI bleed however patient has refused endoscopies in the past    Anxiety  Assessment & Plan  · Patient on Lorazepam 0 5 mg prn anxiety    Seizure disorder (HCC)  Assessment & Plan  · Continue Keppra 500 mg BID - recently decreased to once daily  · Last seizure was years ago    Ataxia  Assessment & Plan  · From taking Dilantin for 30 years - discontinued 5 years ago and patient was switched to [employer] which has seemed to result in less issues with ataxia  · Followed by a Neurologist    Spinal stenosis  Assessment & Plan  · H/o spinal stenosis  · Stable at this time  · Continue outpatient f/u    Arthritis  Assessment & Plan  · Continue PTA Tramadol 50 mg q6h prn pain    Essential hypertension  Assessment & Plan  · Continue on PTA Lisinopril 20 mg daily    VTE Pharmacologic Prophylaxis:   Moderate Risk (Score 3-4) - Pharmacological DVT Prophylaxis Contraindicated  Sequential Compression Devices Ordered  Code Status: Level 3 - DNAR and DNI discussed with patient and son  Discussion with family: Updated  (son) via phone  Anticipated Length of Stay: Patient will be admitted on an inpatient basis with an anticipated length of stay of greater than 2 midnights secondary to ambulatory dysfunction  Total Time for Visit, including Counseling / Coordination of Care: 45 minutes Greater than 50% of this total time spent on direct patient counseling and coordination of care  Chief Complaint: frequent falls    History of Present Illness:  Janet Barton is a 80 y o  female with a PMH of seizures, HTN, arthritis, anxiety disorder, spinal stenosis who presents after having sustained a fall at her home  She states that at around 12nn of day of admission, she was in her kitchen trying to prepare something to eat for herself when she leaned backwards and fell back and landed on her butt  She did not lose consciousness, did not have any prodromal symptoms prior to the fall  Denied any palpitations, lightheadedness, blurring of vision, weakness prior to the fall  She stated that she tried to get up however she was unable to so she crawled to her couch and tried to get herself up the couch however she was unable to do that as well so she called EMS  As per the patient's son, this has been happening more and more frequently and in the last month, they have had to call EMS 3 times  Previously, the patient had been able to pull herself up from her falls however of late, she has been unable to as she claims she feels weaker       The patient states that both her knees have bad arthritis however her right knee is the one that pains her the most and she keeps a brace on this  She endorses that she has had reduced po intake because she is unable to get up to make herself food for fear of falling down  She has a home health aide who comes in [facility] at 7pm and helps her get into bed and stays for 1 5 hours on those days  Patient denies fevers, chills, recent colds, cough, sick contacts  Review of Systems:  Review of Systems   Constitutional: Positive for activity change and appetite change  Negative for chills and fever  HENT: Negative for ear pain and sore throat  Eyes: Negative for pain and visual disturbance  Respiratory: Negative for cough and shortness of breath  Cardiovascular: Negative for chest pain and palpitations  Gastrointestinal: Negative for abdominal pain and vomiting  Genitourinary: Negative for dysuria and hematuria  Musculoskeletal: Positive for arthralgias and gait problem  Negative for back pain  Skin: Negative for color change and rash  Neurological: Positive for weakness  Negative for seizures and syncope         + gait problem and frequent falls   All other systems reviewed and are negative  Past Medical and Surgical History:   Past Medical History:   Diagnosis Date    Arthritis     Hypertension        Past Surgical History:   Procedure Laterality Date    TONSILLECTOMY         Meds/Allergies:  Prior to Admission medications    Not on File     I have reviewed home medications with patient family member      Allergies: No Known Allergies    Social History:  Marital Status: /Civil Union   Occupation: retired  Patient Pre-hospital Living Situation: Home  Patient Pre-hospital Level of Mobility: walks with walker  Patient Pre-hospital Diet Restrictions: none  Substance Use History:   Social History     Substance and Sexual Activity   Alcohol Use Never     Social History     Tobacco Use   Smoking Status Never Smoker   Smokeless Tobacco Never Used     Social History     Substance and Sexual Activity Drug Use Never       Family History:  History reviewed  No pertinent family history  Physical Exam:     Vitals:   Blood Pressure: (!) 184/84 (02/07/22 1853)  Pulse: (!) 106 (02/07/22 1853)  Temperature: 98 5 °F (36 9 °C) (02/07/22 1853)  Temp Source: Temporal (02/07/22 1256)  Respirations: 18 (02/07/22 1853)  Weight - Scale: 72 1 kg (158 lb 15 2 oz) (02/07/22 1256)  SpO2: 97 % (02/07/22 1853)    Physical Exam  Vitals and nursing note reviewed  Constitutional:       General: She is not in acute distress  Appearance: She is well-developed  HENT:      Head: Normocephalic and atraumatic  Eyes:      Conjunctiva/sclera: Conjunctivae normal    Cardiovascular:      Rate and Rhythm: Normal rate and regular rhythm  Heart sounds: No murmur heard  Pulmonary:      Effort: Pulmonary effort is normal  No respiratory distress  Breath sounds: Normal breath sounds  Abdominal:      Palpations: Abdomen is soft  Tenderness: There is no abdominal tenderness  Musculoskeletal:      Cervical back: Neck supple  Skin:     General: Skin is warm and dry  Neurological:      General: No focal deficit present  Mental Status: She is alert and oriented to person, place, and time              Additional Data:     Lab Results:  Results from last 7 days   Lab Units 02/07/22  1320   WBC Thousand/uL 9 72   HEMOGLOBIN g/dL 7 8*   HEMATOCRIT % 28 1*   PLATELETS Thousands/uL 663*   NEUTROS PCT % 69   LYMPHS PCT % 21   MONOS PCT % 8   EOS PCT % 1     Results from last 7 days   Lab Units 02/07/22  1320   SODIUM mmol/L 132*   POTASSIUM mmol/L 4 4   CHLORIDE mmol/L 98*   CO2 mmol/L 25   BUN mg/dL 18   CREATININE mg/dL 1 09   ANION GAP mmol/L 9   CALCIUM mg/dL 10 9*   ALBUMIN g/dL 2 5*   TOTAL BILIRUBIN mg/dL 0 27   ALK PHOS U/L 105   ALT U/L 19   AST U/L 31   GLUCOSE RANDOM mg/dL 111     Results from last 7 days   Lab Units 02/07/22  1320   INR  1 13             Results from last 7 days   Lab Units 02/07/22  1538 LACTIC ACID mmol/L 1 1       Imaging:   CT head without contrast   Final Result by Agustín Trimble MD (02/07 1706)      No acute intracranial abnormality  Acute left maxillary sinusitis  Workstation performed: [id number]         XR chest 1 view portable   Final Result by Wil Miramontes DO (02/07 1536)      No focal consolidation, pleural effusion, or pneumothorax  Workstation performed: [id number]         XR hip/pelv 2-3 vws right if performed   Final Result by Wil Miramontes DO (02/07 1533)      No acute osseous abnormality  Workstation performed: [id number]         XR knee 4+ vw right injury   Final Result by Wil Miramontes DO (02/07 1602)      No acute osseous abnormality  Degenerative changes as described  Workstation performed: [id number]         XR spine lumbar 2 or 3 views injury   Final Result by Wil Miramontes DO (02/07 1603)      No acute osseous abnormality  Degenerative changes as described  Workstation performed: [id number]             EKG and Other Studies Reviewed on Admission:   · EKG: EKG pending  ** Please Note: This note has been constructed using a voice recognition system   **

## 2022-02-07 NOTE — ASSESSMENT & PLAN NOTE
· Falling more frequently at home (in the last 3 months), multiple calls to EMS  · Likely secondary to bad arthritis of bilateral knees  · PT and OT consulted for evaluation  · Consult to case management also placed for possible long-term placement  · xrays negative for any fractures

## 2022-02-07 NOTE — ASSESSMENT & PLAN NOTE
· From taking Dilantin for 30 years - discontinued 5 years ago and patient was switched to [employer] which has seemed to result in less issues with ataxia  · Followed by a Neurologist

## 2022-02-07 NOTE — ASSESSMENT & PLAN NOTE
· Patient presents with Ca of 12 1, unclear etiology  · Obtain PTH, PTH-rp, V25(OH)D, 1,25 dihydroxyvitamin D levels  · Obtain urinary calcium level  · Continue IVF hydration for now and continue to monitor Ca

## 2022-02-07 NOTE — ASSESSMENT & PLAN NOTE
· Presents with sodium of 132, likely secondary to poor po intake  · Continue IVF for now and monitor Na

## 2022-02-07 NOTE — ASSESSMENT & PLAN NOTE
· Baseline hgb is 7 2 (lowest it's been); last check on Jan was at 7 5  · Continue ferrous sulfate 325 mg daily  · Obtain iron panel  · No history of GI bleed however patient has refused endoscopies in the past

## 2022-02-08 LAB
ALBUMIN SERPL BCP-MCNC: 2.2 G/DL (ref 3.5–5)
ALP SERPL-CCNC: 97 U/L (ref 46–116)
ALT SERPL W P-5'-P-CCNC: 18 U/L (ref 12–78)
ANION GAP SERPL CALCULATED.3IONS-SCNC: 7 MMOL/L (ref 4–13)
AST SERPL W P-5'-P-CCNC: 19 U/L (ref 5–45)
ATRIAL RATE: 93 BPM
BASOPHILS # BLD AUTO: 0.03 THOUSANDS/ÂΜL (ref 0–0.1)
BASOPHILS NFR BLD AUTO: 0 % (ref 0–1)
BILIRUB SERPL-MCNC: 0.24 MG/DL (ref 0.2–1)
BUN SERPL-MCNC: 14 MG/DL (ref 5–25)
CALCIUM ALBUM COR SERPL-MCNC: 11.2 MG/DL (ref 8.3–10.1)
CALCIUM SERPL-MCNC: 9.8 MG/DL (ref 8.3–10.1)
CHLORIDE SERPL-SCNC: 100 MMOL/L (ref 100–108)
CO2 SERPL-SCNC: 25 MMOL/L (ref 21–32)
CREAT SERPL-MCNC: 1.03 MG/DL (ref 0.6–1.3)
EOSINOPHIL # BLD AUTO: 0.11 THOUSAND/ÂΜL (ref 0–0.61)
EOSINOPHIL NFR BLD AUTO: 1 % (ref 0–6)
ERYTHROCYTE [DISTWIDTH] IN BLOOD BY AUTOMATED COUNT: 18.6 % (ref 11.6–15.1)
FERRITIN SERPL-MCNC: 66 NG/ML (ref 8–388)
GFR SERPL CREATININE-BSD FRML MDRD: 47 ML/MIN/1.73SQ M
GLUCOSE SERPL-MCNC: 122 MG/DL (ref 65–140)
HCT VFR BLD AUTO: 25.2 % (ref 34.8–46.1)
HGB BLD-MCNC: 7.1 G/DL (ref 11.5–15.4)
IMM GRANULOCYTES # BLD AUTO: 0.08 THOUSAND/UL (ref 0–0.2)
IMM GRANULOCYTES NFR BLD AUTO: 1 % (ref 0–2)
IRON SATN MFR SERPL: 7 % (ref 15–50)
IRON SERPL-MCNC: 18 UG/DL (ref 50–170)
LYMPHOCYTES # BLD AUTO: 3.21 THOUSANDS/ÂΜL (ref 0.6–4.47)
LYMPHOCYTES NFR BLD AUTO: 28 % (ref 14–44)
MAGNESIUM SERPL-MCNC: 1.9 MG/DL (ref 1.6–2.6)
MCH RBC QN AUTO: 19.3 PG (ref 26.8–34.3)
MCHC RBC AUTO-ENTMCNC: 28.2 G/DL (ref 31.4–37.4)
MCV RBC AUTO: 69 FL (ref 82–98)
MONOCYTES # BLD AUTO: 1.13 THOUSAND/ÂΜL (ref 0.17–1.22)
MONOCYTES NFR BLD AUTO: 10 % (ref 4–12)
NEUTROPHILS # BLD AUTO: 7.13 THOUSANDS/ÂΜL (ref 1.85–7.62)
NEUTS SEG NFR BLD AUTO: 60 % (ref 43–75)
NRBC BLD AUTO-RTO: 0 /100 WBCS
P AXIS: 59 DEGREES
PLATELET # BLD AUTO: 586 THOUSANDS/UL (ref 149–390)
PMV BLD AUTO: 9.4 FL (ref 8.9–12.7)
POTASSIUM SERPL-SCNC: 4.1 MMOL/L (ref 3.5–5.3)
PR INTERVAL: 170 MS
PROCALCITONIN SERPL-MCNC: 0.13 NG/ML
PROT SERPL-MCNC: 7.1 G/DL (ref 6.4–8.2)
QRS AXIS: 71 DEGREES
QRSD INTERVAL: 60 MS
QT INTERVAL: 340 MS
QTC INTERVAL: 422 MS
RBC # BLD AUTO: 3.67 MILLION/UL (ref 3.81–5.12)
SODIUM SERPL-SCNC: 132 MMOL/L (ref 136–145)
T WAVE AXIS: 57 DEGREES
TIBC SERPL-MCNC: 261 UG/DL (ref 250–450)
VENTRICULAR RATE: 93 BPM
WBC # BLD AUTO: 11.69 THOUSAND/UL (ref 4.31–10.16)

## 2022-02-08 PROCEDURE — 82728 ASSAY OF FERRITIN: CPT | Performed by: STUDENT IN AN ORGANIZED HEALTH CARE EDUCATION/TRAINING PROGRAM

## 2022-02-08 PROCEDURE — 82652 VIT D 1 25-DIHYDROXY: CPT | Performed by: STUDENT IN AN ORGANIZED HEALTH CARE EDUCATION/TRAINING PROGRAM

## 2022-02-08 PROCEDURE — 99232 SBSQ HOSP IP/OBS MODERATE 35: CPT | Performed by: STUDENT IN AN ORGANIZED HEALTH CARE EDUCATION/TRAINING PROGRAM

## 2022-02-08 PROCEDURE — 83550 IRON BINDING TEST: CPT | Performed by: STUDENT IN AN ORGANIZED HEALTH CARE EDUCATION/TRAINING PROGRAM

## 2022-02-08 PROCEDURE — 83735 ASSAY OF MAGNESIUM: CPT | Performed by: STUDENT IN AN ORGANIZED HEALTH CARE EDUCATION/TRAINING PROGRAM

## 2022-02-08 PROCEDURE — 83540 ASSAY OF IRON: CPT | Performed by: STUDENT IN AN ORGANIZED HEALTH CARE EDUCATION/TRAINING PROGRAM

## 2022-02-08 PROCEDURE — 85025 COMPLETE CBC W/AUTO DIFF WBC: CPT | Performed by: STUDENT IN AN ORGANIZED HEALTH CARE EDUCATION/TRAINING PROGRAM

## 2022-02-08 PROCEDURE — 82397 CHEMILUMINESCENT ASSAY: CPT | Performed by: STUDENT IN AN ORGANIZED HEALTH CARE EDUCATION/TRAINING PROGRAM

## 2022-02-08 PROCEDURE — 97167 OT EVAL HIGH COMPLEX 60 MIN: CPT

## 2022-02-08 PROCEDURE — 82306 VITAMIN D 25 HYDROXY: CPT | Performed by: STUDENT IN AN ORGANIZED HEALTH CARE EDUCATION/TRAINING PROGRAM

## 2022-02-08 PROCEDURE — 97116 GAIT TRAINING THERAPY: CPT

## 2022-02-08 PROCEDURE — 84145 PROCALCITONIN (PCT): CPT | Performed by: STUDENT IN AN ORGANIZED HEALTH CARE EDUCATION/TRAINING PROGRAM

## 2022-02-08 PROCEDURE — 97535 SELF CARE MNGMENT TRAINING: CPT

## 2022-02-08 PROCEDURE — 97163 PT EVAL HIGH COMPLEX 45 MIN: CPT

## 2022-02-08 PROCEDURE — 80053 COMPREHEN METABOLIC PANEL: CPT | Performed by: STUDENT IN AN ORGANIZED HEALTH CARE EDUCATION/TRAINING PROGRAM

## 2022-02-08 PROCEDURE — 97129 THER IVNTJ 1ST 15 MIN: CPT

## 2022-02-08 RX ADMIN — PANTOPRAZOLE SODIUM 40 MG: 40 TABLET, DELAYED RELEASE ORAL at 06:02

## 2022-02-08 RX ADMIN — LORAZEPAM 0.5 MG: 0.5 TABLET ORAL at 17:20

## 2022-02-08 RX ADMIN — FERROUS SULFATE TAB 325 MG (65 MG ELEMENTAL FE) 325 MG: 325 (65 FE) TAB at 07:44

## 2022-02-08 RX ADMIN — LEVETIRACETAM 500 MG: 500 TABLET, FILM COATED ORAL at 07:44

## 2022-02-08 RX ADMIN — LISINOPRIL 20 MG: 10 TABLET ORAL at 07:44

## 2022-02-08 RX ADMIN — TRAMADOL HYDROCHLORIDE 50 MG: 50 TABLET, FILM COATED ORAL at 06:56

## 2022-02-08 RX ADMIN — STANDARDIZED SENNA CONCENTRATE 8.6 MG: 8.6 TABLET ORAL at 07:44

## 2022-02-08 RX ADMIN — CEFTRIAXONE 1000 MG: 1 INJECTION, SOLUTION INTRAVENOUS at 14:31

## 2022-02-08 NOTE — UTILIZATION REVIEW
Inpatient Admission Authorization Request   NOTIFICATION OF INPATIENT ADMISSION/INPATIENT AUTHORIZATION REQUEST   SERVICING FACILITY:   [address] [place] [address]  Chavo Ivy [address] [place] [address], [address]  Tax ID: [id number]  NPI: [id number]  Place of Service: Inpatient [address]  [address]  Place of Service Code: 24     ATTENDING PROVIDER:  Attending Name and NPI#: Cyrus Andradele [[id number]]  Address: Chavo Ivy [address] [place] [address], [facility] [address]  Phone: [phone]     UTILIZATION REVIEW CONTACT:  [facility] Utilization   Network Utilization Review Department  Phone: [phone]  Fax [phone]  Email: [email or web address]     PHYSICIAN ADVISORY SERVICES:  FOR IONB-ZT-URUQ REVIEW - MEDICAL NECESSITY DENIAL  Phone: [phone]  Fax: [phone]  Email: [email or web address]     TYPE OF REQUEST:  Inpatient Status     ADMISSION INFORMATION:  ADMISSION DATE/TIME: 2/7/22  4:20 PM  PATIENT DIAGNOSIS CODE/DESCRIPTION:  Hypercalcemia [E83 52]  Sinusitis [J32 9]  UTI (urinary tract infection) [N39 0]  Weakness [R53 1]  Anemia [D64 9]  Frequent falls [R29 6]  Ambulatory dysfunction [R26 2]  Osteoarthritis of right knee, unspecified osteoarthritis type [M17 11]  DISCHARGE DATE/TIME: No discharge date for patient encounter  DISCHARGE DISPOSITION (IF DISCHARGED): Final discharge disposition not confirmed     IMPORTANT INFORMATION:  Please contact the [facility] directly with any questions or concerns regarding this request  Department voicemails are confidential     Send requests for admission clinical reviews, concurrent reviews, approvals, and administrative denials due to lack of clinical to fax [phone]

## 2022-02-08 NOTE — PROGRESS NOTES
Pt reports decreased appetite/intake  Says this is partly related to not moving as much/pain  Noting weight variations per chart review  Pt reports no recent weight changes  11/25/20 151lb, 5/20/21 158lb, 11/22/21 153lb, 2/8/22 158lb  Pt reports eating 100% of eggs, juice, oatmeal at breakfast this AM  Reports intake is improved since meals are provided to her  Per MD note, possible long term placement, if d/c home may benefit from home delivered meals  Will order ensure enlive daily to optimize kcal/PRO intake  Continue regular diet

## 2022-02-08 NOTE — UTILIZATION REVIEW
Initial Clinical Review    Admission: Date/Time/Statement:   Admission Orders (From admission, onward)     Ordered        02/07/22 1620  Inpatient Admission  Once                      Orders Placed This Encounter   Procedures    Inpatient Admission     Standing Status:   Standing     Number of Occurrences:   1     Order Specific Question:   Level of Care     Answer:   Med Surg [16]     Order Specific Question:   Estimated length of stay     Answer:   More than 2 Midnights     Order Specific Question:   Certification     Answer:   I certify that inpatient services are medically necessary for this patient for a duration of greater than two midnights  See H&P and MD Progress Notes for additional information about the patient's course of treatment  ED Arrival Information     Expected Arrival Acuity    2/7/2022 12:46 2/7/2022 12:46 Urgent         Means of arrival Escorted by Service Admission type    Ambulance [facility] Urgent         Arrival complaint    fall         Chief Complaint   Patient presents with    Fall     Pt had multiple falls at home  Pt denies blood thinners or LOC        Initial Presentation: 80year old female, presents to ED via EMS from home  Presents after sustaining a fall at her home approx  4 hours PTA to ED  Leaned back and fell landing on buttocks, no LOC, this is the 3rd fall over the past month with multiple calls to ems per pts son  Inpatient class admission for evaluation and treatment of ambulatory dysfunction  2/2 severe arthritis of bilateral knees  Acute cystitis with hematuria, hypercalcemia, hyponatremia,  Iron deficiency anemia  UA + for UTI w/cystitis, Ca 12 1,   2/2 poor po intake, baseline HgB 7 2 previous HgB 7 5 no h/o GI Bleed, no s/s GIB, pt  Refused endoscopies in past  PMH: HTN, seizures, arthritis, anxiety disorder, spinal stenosis, ataxia    Plan: OT/PT eval, Case management for long-term placement, IVF, IV ABX, UCx, pending PTH, PTH-rp, V25(OH)D, 1,25  dihydroxyvitamin D levels, pending urinary calcium level, trend serial  Ca levels, trend serial BMP, trend serial CBC  C/W home medication  regimen for: anxiety, seizures, HTN, arthritis,  And ataxia  Date: 2/8/22  Day 2: hypercalcemia improving w/IVF  Ambulatory dysfunction PT/OT recommend rehab, H/H downward trending, hyponatremia, c/w serial trending of CBC, CMP  Replete as needed and observe for overt S/S of bleeding,  The patient will continue to require additional inpatient hospital stay due to UTI C/W IV ABX  Intake/Output Summary (Last 24 hours) at 2/8/2022 1437  Last data filed at 2/8/2022 1211      Gross per 24 hour   Intake 790 ml   Output 550 ml   Net 240 ml       ED Triage Vitals [02/07/22 1256]   Temperature Pulse Respirations Blood Pressure SpO2   98 4 °F (36 9 °C) 88 16 146/68 99 %      Temp Source Heart Rate Source Patient Position - Orthostatic VS BP Location FiO2 (%)   Temporal Monitor Sitting Right arm --      Pain Score       No Pain          Wt Readings from Last 1 Encounters:   02/08/22 71 9 kg (158 lb 8 2 oz)     Additional Vital Signs:   02/08/22 07:08:03 98 4 °F (36 9 °C) 87 18 129/60 83 97 % -- --   02/07/22 18:53:33 98 5 °F (36 9 °C) 106 Abnormal  18 184/84 Abnormal  117 97 % -- --   02/07/22 1530 -- 87 20 142/79 -- 96 % None (Room air) Sitting   02/07/22 1305 -- -- -- -- -- -- None (Room air) --   [place] Coma Scale    Date and Time Eye Opening Best Verbal Response Best Motor Response Jak Coma Scale Score   02/07/22 2033 4 5 6 15   02/07/22 1330 4 5 6 15         Pertinent Labs/Diagnostic Test Results:   2/7/22CT head without contrast: No acute intracranial abnormality  Acute left maxillary sinusitis  2/7/22CXR: No focal consolidation, pleural effusion, or pneumothorax     2/7/22 EKG: NSR, Normal EKG  Results from last 7 days   Lab Units 02/07/22  1320   SARS-COV-2  Negative     Results from last 7 days   Lab Units 02/08/22  0446 02/07/22  3958 WBC Thousand/uL 11 69* 9 72   HEMOGLOBIN g/dL 7 1* 7 8*   HEMATOCRIT % 25 2* 28 1*   PLATELETS Thousands/uL 586* 663*   NEUTROS ABS Thousands/µL 7 13 6 68         Results from last 7 days   Lab Units 02/08/22  0446 02/07/22  1320   SODIUM mmol/L 132* 132*   POTASSIUM mmol/L 4 1 4 4   CHLORIDE mmol/L 100 98*   CO2 mmol/L 25 25   ANION GAP mmol/L 7 9   BUN mg/dL 14 18   CREATININE mg/dL 1 03 1 09   EGFR ml/min/1 73sq m 47 44   CALCIUM mg/dL 9 8 10 9*   MAGNESIUM mg/dL 1 9 2 1     Results from last 7 days   Lab Units 02/08/22  0446 02/07/22  1320   AST U/L 19 31   ALT U/L 18 19   ALK PHOS U/L 97 105   TOTAL PROTEIN g/dL 7 1 8 0   ALBUMIN g/dL 2 2* 2 5*   TOTAL BILIRUBIN mg/dL 0 24 0 27         Results from last 7 days   Lab Units 02/08/22  0446 02/07/22  1320   GLUCOSE RANDOM mg/dL 122 111           Results from last 7 days   Lab Units 02/07/22  1320   CK TOTAL U/L 81     Results from last 7 days   Lab Units 02/07/22  1538 02/07/22  1320   HS TNI 0HR ng/L  --  3   HS TNI 2HR ng/L 3  --    HSTNI D2 ng/L 0  --          Results from last 7 days   Lab Units 02/07/22  1320   PROTIME seconds 14 4   INR  1 13     Results from last 7 days   Lab Units 02/07/22  1320   TSH 3RD GENERATON uIU/mL 1 642         Results from last 7 days   Lab Units 02/07/22  1538   LACTIC ACID mmol/L 1 1           Results from last 7 days   Lab Units 02/07/22  1320   LIPASE u/L 88       Results from last 7 days   Lab Units 02/07/22  1320   CLARITY UA  Slightly Cloudy   COLOR UA  Yellow   SPEC GRAV UA  1 025   PH UA  6 0   GLUCOSE UA mg/dl Negative   KETONES UA mg/dl Negative   BLOOD UA  Large*   PROTEIN UA mg/dl Negative   NITRITE UA  Negative   BILIRUBIN UA  Negative   UROBILINOGEN UA E U /dl 0 2   LEUKOCYTES UA  Moderate*   WBC UA /hpf 10-20*   RBC UA /hpf Innumerable*   BACTERIA UA /hpf Innumerable*   EPITHELIAL CELLS WET PREP /hpf Moderate*   MUCUS THREADS  Occasional*     Results from last 7 days   Lab Units 02/07/22  1320   INFLUENZA A PCR Negative   INFLUENZA B PCR  Negative   RSV PCR  Negative       Results from last 7 days   Lab Units 02/07/22  1537   BLOOD CULTURE  Received in Microbiology Lab  Culture in Progress  ED Treatment:   Medication Administration from 02/07/2022 1246 to 02/07/2022 1849       Date/Time Order Dose Route Action     02/07/2022 1327 sodium chloride 0 9 % bolus 500 mL 500 mL Intravenous New Bag     02/07/2022 1538 cefTRIAXone (ROCEPHIN) IVPB (premix in dextrose) 1,000 mg 50 mL 1,000 mg Intravenous New Bag     02/07/2022 1630 sodium chloride 0 9 % bolus 1,000 mL 1,000 mL Intravenous New Bag        Past Medical History:   Diagnosis Date    Arthritis     Hypertension      Admitting Diagnosis: Hypercalcemia [E83 52]  Sinusitis [J32 9]  UTI (urinary tract infection) [N39 0]  Weakness [R53 1]  Anemia [D64 9]  Frequent falls [R29 6]  Ambulatory dysfunction [R26 2]  Osteoarthritis of right knee, unspecified osteoarthritis type [M17 11]  Age/Sex: 80 y o  female  Admission Orders:daily wts, I/o, scd, orthostatic VS once, up with assistance,   Scheduled Medications:  cefTRIAXone, 1,000 mg, Intravenous, Q24H  ferrous sulfate, 325 mg, Oral, Daily With Breakfast  levETIRAcetam, 500 mg, Oral, Daily  lisinopril, 20 mg, Oral, Daily  pantoprazole, 40 mg, Oral, Early Morning  senna, 1 tablet, Oral, Daily    Continuous IV Infusions:  sodium chloride, 100 mL/hr, Intravenous, Continuous    PRN Meds:  acetaminophen, 650 mg, Oral, Q6H PRN  LORazepam, 0 5 mg, Oral, Q8H PRN 2/7 x1, 2/8 x1  sodium chloride (PF), 3 mL, Intravenous, Q1H PRN  traMADol, 50 mg, Oral, Q6H PRN 2/7 x1, 2/8 x1      IP CONSULT TO CASE MANAGEMENT    Network Utilization Review Department  ATTENTION: Please call with any questions or concerns to [phone] and carefully listen to the prompts so that you are directed to the right person   All voicemails are confidential   Gaby Gallardo all requests for admission clinical reviews, approved or denied determinations and any other requests to dedicated fax number below belonging to the campus where the patient is receiving treatment   List of dedicated fax numbers for the Facilities:  [address] [place] [address] DENIALS (Administrative/Medical Necessity) [phone]   [address] (Maternity/NICU/Pediatrics) [phone]   [address]  [address] Medical [place] [address] [address] [place] [address] [address] [place] [address] [facility] [place] [address] [phone]

## 2022-02-08 NOTE — CASE MANAGEMENT
Case Management Assessment & Discharge Planning Note    Patient name Capo Jeff  Location /-32 MRN [id number]  : 1932 Date 2022       Current Admission Date: 2022  Current Admission Diagnosis:Ambulatory dysfunction   Patient Active Problem List    Diagnosis Date Noted    Spinal stenosis 2022    Ataxia 2022    Seizure disorder ([address] ) 2022    Iron deficiency anemia 2022    Anxiety 2022    Hypercalcemia 2022    Hyponatremia 2022    Essential hypertension     Arthritis     Ambulatory dysfunction     Acute cystitis with hematuria       LOS (days): 1  Geometric Mean LOS (GMLOS) (days): 3 00  Days to GMLOS:2 1     OBJECTIVE:    Risk of Unplanned Readmission Score: 11         Current admission status: Inpatient       Preferred Pharmacy:   [address] [place] [address] Kobe Samano [address] [place] [address]  Phone: [phone] Fax: [phone]    Primary Care Provider: Abebe Saba MD    Primary Insurance: [employer] [facility]  Secondary Insurance:     ASSESSMENT:  Active Health Care Agents    There are no active Health Care Agents on file  Advance Directives  Does patient have a [address]?: Yes (son, Silvino Arreola)  Does patient have Advance Directives?: Yes  Advance Directives: Power of  for health care,Power of  for finance  Primary Contact: erasmo posey              Patient Information  Admitted from[de-identified] Home  Mental Status: Confused  During Assessment patient was accompanied by: Not accompanied during assessment  Assessment information provided by[de-identified] Son  Primary Caregiver: Self  Support Systems: Son  [place] of Residence: [address] [place] [address] entry access options   Select all that apply : Stairs  Number of steps to enter home : 4  Type of Current Residence: [address] [place] home  Upon entering residence, is there a bedroom on the main floor (no further steps)?: No  A bedroom is located on the following floor levels of residence (select all that apply):: 2nd Floor  Upon entering residence, is there a bathroom on the main floor (no further steps)?: No  Indicate which floors of current residence have a bathroom (select all the apply):: 2nd Floor  Number of steps to 2nd floor from main floor: One Flight (pt has stair glide)  In the last 12 months, was there a time when you were not able to pay the mortgage or rent on time?: No  In the last 12 months, how many places have you lived?: 1  In the last 12 months, was there a time when you did not have a steady place to sleep or slept in a shelter (including now)?: No  Living Arrangements: Lives Alone    Activities of Daily Living Prior to Admission  Functional Status: Independent  Completes ADLs independently?: Yes  Ambulates independently?: Yes  Does patient use assisted devices?: Yes  Assisted Devices (DME) used:  Wheelchair,Walker  Does patient currently own DME?: Yes  What DME does the patient currently own?: [facility]  Does patient have a history of Outpatient Therapy (PT/OT)?: No  Does the patient have a history of Short-Term Rehab?: No  Does patient have a history of HHC?: No  Does patient currently have [address]?: No         Patient Information Continued  Income Source: Pension/halfway  Does patient have prescription coverage?: Yes  Within the past 12 months, you worried that your food would run out before you got the money to buy more : Never true  Within the past 12 months, the food you bought just didnt last and you didnt have money to get more : Never true  Does patient receive dialysis treatments?: No  Does patient have a history of substance abuse?: No  Does patient have a history of Mental Health Diagnosis?: No         Means of Transportation  Means of Transport to Appts[de-identified] Family transport  In the past 12 months, has lack of transportation kept you from medical appointments or from getting medications?: No  In the past 12 months, has lack of transportation kept you from meetings, work, or from getting things needed for daily living?: No        DISCHARGE DETAILS:    Discharge planning discussed with[de-identified] sonMoreno of Choice: Yes     CM contacted family/caregiver?: Yes  Were Treatment Team discharge recommendations reviewed with patient/caregiver?: Yes  Did patient/caregiver verbalize understanding of patient care needs?: Yes  Were patient/caregiver advised of the risks associated with not following Treatment Team discharge recommendations?: Yes    Contacts  Patient Contacts: sonCalderon  Relationship to Patient[de-identified] Family  Contact Method: Phone  Reason/Outcome: Discharge Planning                   Would you like to participate in our [address] service program?  : No - Declined                 PT lives alone in a 2Sh with 4 AMEYA  Pts bedroom/bathroom on second floor, pt has a stair glide  Pt uses a walker inside the home and a WC when outdoors    Pts  resides at [facility], is POA  Pt is vaccinated, [employer] 3/31 and 4/28

## 2022-02-08 NOTE — ASSESSMENT & PLAN NOTE
· Falling more frequently at home (in the last 3 months), multiple calls to EMS  · Likely secondary to bad arthritis of bilateral knees  · PT and OT consulted for evaluation  · Consult to case management also placed for possible long-term placement - recommend rehab  · xrays negative for any fractures

## 2022-02-08 NOTE — ASSESSMENT & PLAN NOTE
· Patient presents with Ca of 12 1, unclear etiology  · Obtain PTH, PTH-rp, V25(OH)D, 1,25 dihydroxyvitamin D levels  · Obtain urinary calcium level  · Improving with IVF most likely 2/2 poor po intake

## 2022-02-08 NOTE — CASE MANAGEMENT
Case Management Progress Note    Patient name Cpao Jeff  Location /-01 MRN [id number]  : 1932 Date 2022       LOS (days): 1  Geometric Mean LOS (GMLOS) (days): 3 00  Days to GMLOS:2 1        OBJECTIVE:        Current admission status: Inpatient  Preferred Pharmacy:   [address] [place] [address]  Phone: [phone] Fax: [phone]    Primary Care Provider: Abebe Saba MD    Primary Insurance: [employer]   Secondary Insurance:     PROGRESS NOTE:        STR has been recommended for pt at time of discharge  CM spoke with pts son, Silvino Arreola, who is POA  Son sts his mother lives alone, as pts  is in [address] [place] [address] at [facility] sts he wishes for pt to also be placed at [facility] for STR at this time      As per request, CM placing referral in [facility] to [facility]

## 2022-02-08 NOTE — OCCUPATIONAL THERAPY NOTE
Occupational Therapy Evaluation     Evaluation: [id number]  Treatment: [id number]    Patient Name: Jules Aldana  FFBSW'L Date: 2/8/2022  Problem List  Principal Problem:    Ambulatory dysfunction  Active Problems:    Essential hypertension    Arthritis    Acute cystitis with hematuria    Spinal stenosis    Ataxia    Seizure disorder (HCC)    Iron deficiency anemia    Anxiety    Hypercalcemia    Hyponatremia    Past Medical History  Past Medical History:   Diagnosis Date    Arthritis     Hypertension      Past Surgical History  Past Surgical History:   Procedure Laterality Date    TONSILLECTOMY             02/08/22 1103   Note Type   Note type Evaluation   Restrictions/Precautions   Braces or Orthoses LE Braces  (R knee brace)   Other Precautions Chair Alarm; Bed Alarm;Cognitive; Fall Risk   Pain Assessment   Pain Assessment Tool 0-10   Pain Score No Pain   Home Living   Type of Home House  (3 AMEYA B HR)   Home Layout Two level;Bed/bath upstairs  (stair glide to 2nd floor)   Bathroom Shower/Tub Tub/shower unit   Bathroom Toilet Standard   Bathroom Equipment Shower chair;Grab bars in [facility] 124; Derek Pulido  (RW in the home  w/c for community mobility)   Additional Comments Pt reports living in a 2 story home with 3 AMEYA  Pt has no bathroom on 1st floor, full bed/bath on 2nd floor  Pt ambulates with RW  Prior Function   Level of [place] Independent with ADLs and functional mobility   Lives With Alone   Receives Help From Family  ("Ashely Beckett")   ADL Assistance Independent   IADLs Needs assistance  (can complete light meal prep)   Falls in the last 6 months 1 to 4   Comments Pt reports completing ADLs, functional ambulation and light meal prep @ Mod I   Pt has assistance for IADLs and community mobility from her son and "lorene nance"   ADL   Where Assessed Edge of bed   Grooming Assistance   (Steadying Assist - standing at sinkside)   Grooming Deficit Verbal cueing;Supervision/safety; Increased time to complete;Wash/dry hands; Wash/dry face   UB Dressing Assistance 5  Supervision/Setup   UB Dressing Deficit Setup   LB Dressing Assistance   ([employer])   [id number] [facility] device for steadying;Steadying;Verbal cueing;Supervision/safety; Increased time to complete; Don/doff R sock; Don/doff L sock; Thread RLE into pants; Thread LLE into pants;Pull up over hips   Toileting Assistance    ([employer])   Toileting Deficit Steadying;Verbal cueing;Supervison/safety; Increased time to complete;Grab bar use;Clothing management up;Clothing management down;Perineal hygiene   Additional Comments Pt completing ADL tasks while seated at EOB  UB Dressing @ S after set-up  LB Dressing @ [employer] for CM around waist while standing with UB support from RW PRN  Pt donning B socks/shoes @ S with increased time  Pleaes refer to treatment note for performance during toileting and grooming tasks  Bed Mobility   Supine to Sit 5  Supervision   Additional items Increased time required   Additional Comments HOB flat, no bedrails   Transfers   Sit to Stand   [facility] Assist)   Additional items Increased time required;Verbal cues   Stand to Sit   (Steadying Assist)   Additional items Increased time required;Verbal cues   Stand pivot   (Steadying Assist)   Additional items Increased time required;Verbal cues   Toilet transfer   (Steadying Assist)   Additional items Increased time required;Standard toilet;Verbal cues   Additional Comments Pt completing functional transfers with use of RW for UB support  Steadying Assist for STS and SPT with increased time and vc'ing for RW managmenet  Pt reports having a walker tray at home which may b ecause for Pt pushing walker way in front of Pt  Pt noted to be kicking walker as she walks and not utilizing properly   Will contiue to assess and educate as able   Balance   Static Sitting Good   Dynamic Sitting Fair +   Static Standing Fair   Dynamic Standing Fair -   Activity Tolerance   Activity Tolerance Patient limited by fatigue   Medical Staff Made Aware Spoke with PT, Pratima Albarran   Nurse Made Aware Spoke with RN, Chuyita Valle Assessment   RUE Assessment WFL   LUE Assessment   LUE Assessment WFL   Hand Function   Gross Motor Coordination Functional   Fine Motor Coordination Functional   Sensation   Light Touch No apparent deficits   Cognition   Overall Cognitive Status Impaired   Arousal/Participation Alert; Responsive; Cooperative   Attention Attends with cues to redirect   Orientation Level Oriented to person;Oriented to place;Oriented to situation  (place with cues  oriented to month, year with cues)   Following Commands Follows one step commands with increased time or repetition   Comments Pt able to follow 1 step commands  Pt requiring cues for orientation  Pt noted to be repitive with statements and questions  Pt inappropraitely utilizing RW despite multiple vc'ing for correct technique  Therapist completing ACLS with Pt, please refer to treatment note for more information  Cognition Assessment Tools ACLS   Score 4 6   Assessment   Limitation Decreased ADL status; Decreased UE strength;Decreased Safe judgement during ADL;Decreased cognition;Decreased endurance;Decreased self-care trans;Decreased high-level ADLs   Prognosis Good   Assessment Pt is a 80 y o  female, admitted to [address] [place] [address] 2/7/2022 d/t experiencing multiple falls at home  Dx: ambulatory dysfunction  Pt with PMHx impacting their performance during ADL tasks, including: seizures, HTN, arthritis, anxiety, spinal stenosis  Prior to admission to the hospital Pt was performing ADLs without physical assistance  IADLs with physical assistance  Functional transfers/ambulation without physical assistance  Cognitive status was PTA was intact   OT order placed to assess Pt's ADLs, cognitive status, and performance during functional tasks in order to maximize safety and independence while making most appropriate d/c recommendations  Pt's clinical presentation is currently unstable/unpredictable given new onset deficits that effect Pt's occupational performance and ability to safely return to PLOF including decrease activity tolerance, decrease standing balance, decrease performance during ADL tasks, decrease cognition, decrease safety awareness , increase impulsiveness, decrease UB MS, decrease generalized strength, decrease activity engagement, decrease performance during functional transfers, steps to enter home, limited family support, frequent falls, high fall risk and limited insight to deficits combined with medical complications of hypertension , change in mental status, abnormal H&H, abnormal CBC, abnormal sodium values, impulsivity during admission, need for input for mobility technique/safety and elevated calcium levels  Personal factors affecting Pt at time of initial evaluation include: step(s) to enter environment, multi-level environment, availability as recommended, limited home support, advanced age, inability to perform current job functions, inability to perform IADLs, inability to perform ADLs, new need for AD, inability to ambulate household distances, inability to navigate community distances, limited insight into impairments, decreased initiation and engagement, difficulty communicating, recent fall(s)/fall history and questionable non-compliance  Pt will benefit from continued skilled OT services to address deficits as defined above and to maximize level independence/participation during ADLs and functional tasks to facilitate return toward PLOF and improved quality of life  From an occupational therapy standpoint, recommendation at time of d/c would be post acute rehab  Plan   Treatment Interventions ADL retraining;Functional transfer training;UE strengthening/ROM; Endurance training;Cognitive reorientation;Patient/family training;Equipment evaluation/education; Neuromuscular reeducation; Compensatory technique education;Continued evaluation; Energy conservation; Activityengagement   Goal Expiration Date 02/22/22   OT Treatment Day 1   OT Frequency 3-5x/wk   Additional Treatment Session   Start Time 1122   End Time 1148   Treatment Assessment Pt seen for treatment session #1 this date  Pt alert and agreeable to participate at this time  Pt completing short distance ambulation into restroom with use of RW for UB support @ Steadying Assist with vc'ing for RW management and safety  Pt completing toileting tasks @ Steadying Assist including CM around waist while standing and thorough pericare  Pt completing grooming tasks standing at sinkside @ Steadying Assist d/t standing far away from sink and reaching to get to water  Pt requiring vc'ing to correct technique and for consistant safety  Pt then returning to recliner chair with call bell in reach and chair alarm intact  Once comfortable in recliner chair, Therapist completing ACLS with Pt to further assess cognition  Pt able to complete running stitch with no difficulty  Pt completing whip stitch with increased time, minimal difficulty and Pt was able to locate error made and correct with Min cues  Pt then participated well with single cordovian stitch, however continuously put stich throug wrong direction despite 2 demonstrations  Pt scoring 4 6  Please refer to chair below for more information regarding recommendations  Additional Treatment Day 1   Recommendation   OT Discharge Recommendation Post acute rehabilitation services   Additional Comments  D/t Pt's frequent falls and progressing cognitive decline, Pt is recommended for post acute rehab services to maximize safety and independence and ensure safety  Should Pt return home, it is a high probability that Pt will continue to fall and bring further harm/injury to self     AM-PAC Daily Activity Inpatient   Lower Body Dressing 3   Bathing 2   Toileting 3   Upper Body Dressing 3 Grooming 3   Eating 4   Daily Activity Raw Score 18   Daily Activity Standardized Score (Calc for Raw Score >=11) 38 66   AM-PAC Applied Cognition Inpatient   Following a Speech/Presentation 3   Understanding Ordinary Conversation 4   Taking Medications 2   Remembering Where Things Are Placed or Put Away 3   Remembering List of 4-5 Errands 2   Taking Care of Complicated Tasks 2   Applied Cognition Raw Score 16   Applied Cognition Standardized Score 35 03     Although Pt scoring 4/6 on ACLS which suggest "may live alone with daily checks", Pt's family reports Pt is having increased falls at  Home and safety awareness is declining  Pt would benefit from post acute rehab services to maximize safety and independence and further prevent falls/injury  4 6    Administered Charise Nissen Cognitive Level Screen (ACLS)  The patient scored 4 6/6 0 indicating that they may live alone with daily assistance to monitor personal safety and provide a daily allowance  Bill and money management assistance required  Behavior:  Scans environment to retrieve supplies  Notices others  Comments on others nonconformity  Initiates change in routine  Learns by demonstration, not by reading  Can avoid obvious hazards  Often presents as impulsive  Insight into disability is fair/poor  Processing speed is delayed  Do not expect to be aware of the needs of others  May need reminders to keep appointments  May make niesha statements without concern for embarrassing others  Grooming:  Varies typical procedure on his own  May be willing to try new products or tools  May be impulsive  Dressing:  Searches for desired clothing items in drawers or closets  Changes 1 item in an outfit to create a new outfit  May coordinate shoes with outfit  Monitor appropriateness of outfit  Bathing:  May alter bathing routine to account for exercise, hot weather or special event  Washes small hidden spaces  May try new products    May follow suggestions for hanging up towels  May be unable to coordinate bath schedule with others  Remove unseen hazards  Walking/exercising:   Varies routes taken in familiar neighborhood  Scan visible environment for landmarks  Follows verbal explanation for hazards  May attempt to alter amount, duration and speed of exercise  May get lost especially in unfamiliar environments  Eating:  May alter rate of eating on request but does not sustain  May be willing to alter food selections or accept diet without resistance  Looks across the table to converse with others  Watch handling of hot foods/liquids and heavy objects  Monitor compliance with dietary restrictions  Toileting:  Scans visible environment for needed supplies like toilet paper but may not find unless it is at eye level  May take much longer than average to complete toileting  Medications:   May attempt to open new containers but may not be successful  May stop medications because they feel well  May alter amounts of pills without realizing the hazard  May refuse to take medications based on erroneous beliefs about effects  Use of adaptive equipment:  May be able to make spontaneous adjustments in positions or strength for better effect  Scans environment for equipment  May understand verbal explanation of safety hazard  Housekeeping:  Scans environment for needed supplies, dirt and clutter when cleaning  May organize closets, cupboards or put away objects  May require reminders to do household chores  Food preparation:  May follow a fixed diet and go hungry if usual food items are not available  Does not check inventory and may run out of essentials frequently  Does not consider nutritional needs  Scans counters, shelves and grocery stores for needed items  Check stove after use  Spending money:  May be willing to vary usual routine to manage money  May attempt his or her usual routine without anticipating problems    May overspend if given access to bank or credit cards  Shopping:  May vary usual shopping routine by going to a new store or buying different brands  Searches shelves in stores, moves items to look underneath or behind  May overspend  Laundry:  May scan immediate environment for needed supplies  May choose to vary from usual routine  Check iron after use  Check for overloading of washing machine  Check sorting criteria  Traveling:  May vary familiar routes without awareness of consequences  May impulsively take a trip to a new location and get lost   Does not anticipate problems or travel needs  Telephone:  May be able to alter rate of speech for a short phone call  May find a number in an address or phone book  May not be able to locate items in yellow pages  Driving:  Should NOT operate a motor vehicle  The patient's raw score on the AM-PAC Daily Activity inpatient short form is 18, standardized score is 38 66, less than 39 4  Patients at this level are likely to benefit from DC to post-acute rehabilitation services  Please refer to the recommendation of the Occupational Therapist for safe DC planning  Pt goals to be met by 2/22/2022    1  Pt will demonstrate ability to complete LB dressing @ Mod I in order to increase safety and independence during meaningful tasks  2  Pt will demonstrate ability to fredo/doff socks/shoes while sitting EOB @ Mod I in order to increase safety and independence during meaningful tasks  3  Pt will demonstrate ability to complete toileting tasks including CM and pericare @ Mod I in order to increase safety and independence during meaningful tasks  4  Pt will demonstrate ability to complete EOB, chair, toilet/commode transfers @ Mod I in order to increase performance and participation during functional tasks  5  Pt will demonstrate ability to stand for 7-8 minutes while maintaining G balance with use of RW for UB support PRN    6  Pt will demonstrate ability to tolerate 30-35 minute OT session with no vc'ing for deep breathing or use of energy conservation techniques in order to increase activity tolerance during functional tasks  7  Pt will demonstrate Good carryover of use of energy conservation/compensatory strategies during ADLs and functional tasks in order to increase safety and reduce risk for falls  8  Pt will demonstrate Good attention and participation in continued evaluation of functional ambulation house hold distances in order to assist with safe d/c planning  9  Pt will attend to continued cognitive assessments 100% of the time in order to provide most appropriate d/c recommendations  10  Pt will follow 100% simple 2-step commands and be A&O x4 consistently with environmental cues to increase participation in functional activities  11  Pt will identify 3 areas of interest/hobbies and 1 intervention on how to incorporate into daily life in order to increase interaction with environment and peers as well as increase participation in meaningful tasks  12  Pt will demonstrate 100% carryover of BUE HEP in order to increase BUE MS and increase performance during functional tasks upon d/c home      Nat Middleton OTR/L

## 2022-02-08 NOTE — PROGRESS NOTES
[address]  Progress Note - Florecita Bowers 2/6/1932, 80 y o  female MRN: [id number]  Unit/Bed#: -01 Encounter: [id number]  Primary Care Provider: Justo Bello MD   Date and time admitted to hospital: 2/7/2022 12:46 PM    Hyponatremia  Assessment & Plan  · Presents with sodium of 132, likely secondary to poor po intake  · Continue IVF for now and monitor Na    Hypercalcemia  Assessment & Plan  · Patient presents with Ca of 12 1, unclear etiology  · Obtain PTH, PTH-rp, V25(OH)D, 1,25 dihydroxyvitamin D levels  · Obtain urinary calcium level  · Improving with IVF most likely 2/2 poor po intake    Anxiety  Assessment & Plan  · Patient on Lorazepam 0 5 mg prn anxiety    Iron deficiency anemia  Assessment & Plan  · Baseline hgb is 7 2 (lowest it's been); last check on Jan was at 7 5  · Continue ferrous sulfate 325 mg daily  · Obtain iron panel  · No history of GI bleed however patient has refused endoscopies in the past    Seizure disorder (HCC)  Assessment & Plan  · Continue Keppra 500 mg BID - recently decreased to once daily  · Last seizure was years ago    Ataxia  Assessment & Plan  · From taking Dilantin for 30 years - discontinued 5 years ago and patient was switched to [employer] which has seemed to result in less issues with ataxia  · Followed by a Neurologist    Spinal stenosis  Assessment & Plan  · H/o spinal stenosis  · Stable at this time  · Continue outpatient f/u    Acute cystitis with hematuria  Assessment & Plan  · UA positive  · Started on Ceftriaxone 1 g daily in the ED, continue   · Follow-up UCx     Arthritis  Assessment & Plan  · Continue PTA Tramadol 50 mg q6h prn pain    Essential hypertension  Assessment & Plan  · Continue on PTA Lisinopril 20 mg daily    * Ambulatory dysfunction  Assessment & Plan  · Falling more frequently at home (in the last 3 months), multiple calls to EMS  · Likely secondary to bad arthritis of bilateral knees  · PT and OT consulted for evaluation  · Consult to case management also placed for possible long-term placement - recommend rehab  · xrays negative for any fractures          VTE Pharmacologic Prophylaxis:   Moderate Risk (Score 3-4) - Pharmacological DVT Prophylaxis Contraindicated  Sequential Compression Devices Ordered  Patient Centered Rounds: I performed bedside rounds with nursing staff today  Discussions with Specialists or Other Care Team Provider: case managemetn    Education and Discussions with Family / Patient: Updated  (son) via phone  Time Spent for Care: 30 minutes  More than 50% of total time spent on counseling and coordination of care as described above  Current Length of Stay: 1 day(s)  Current Patient Status: Inpatient   Certification Statement: The patient will continue to require additional inpatient hospital stay due to UTI  Discharge Plan: Anticipate discharge in 24-48 hrs to rehab facility  Code Status: Level 3 - DNAR and DNI    Subjective:   Patient seen examined bedside  No acute events overnight  Currently has complaints  Denies any chest pain or any urinary symptoms  Objective:     Vitals:   Temp (24hrs), Av 5 °F (36 9 °C), Min:98 4 °F (36 9 °C), Max:98 5 °F (36 9 °C)    Temp:  [98 4 °F (36 9 °C)-98 5 °F (36 9 °C)] 98 4 °F (36 9 °C)  HR:  [] 87  Resp:  [18-20] 18  BP: (129-184)/(60-84) 129/60  SpO2:  [96 %-97 %] 97 %  Body mass index is 29 95 kg/m²  Input and Output Summary (last 24 hours): Intake/Output Summary (Last 24 hours) at 2022 1437  Last data filed at 2022 1211  Gross per 24 hour   Intake 790 ml   Output 550 ml   Net 240 ml       Physical Exam:   Physical Exam  Vitals reviewed  HENT:      Head: Normocephalic and atraumatic  Right Ear: External ear normal       Left Ear: External ear normal       Nose: Nose normal       Mouth/Throat:      Mouth: Mucous membranes are moist       Pharynx: Oropharynx is clear     Eyes:      Extraocular Movements: Extraocular movements intact  Cardiovascular:      Rate and Rhythm: Normal rate and regular rhythm  Pulses: Normal pulses  Heart sounds: Normal heart sounds  Pulmonary:      Effort: Pulmonary effort is normal       Breath sounds: Normal breath sounds  Abdominal:      General: Abdomen is flat  Palpations: Abdomen is soft  Tenderness: There is no abdominal tenderness  Musculoskeletal:         General: Normal range of motion  Cervical back: Normal range of motion  Skin:     General: Skin is warm and dry  Neurological:      General: No focal deficit present  Mental Status: She is alert  Mental status is at baseline  Psychiatric:         Mood and Affect: Mood normal          Behavior: Behavior normal           Additional Data:     Labs:  Results from last 7 days   Lab Units 02/08/22  0446   WBC Thousand/uL 11 69*   HEMOGLOBIN g/dL 7 1*   HEMATOCRIT % 25 2*   PLATELETS Thousands/uL 586*   NEUTROS PCT % 60   LYMPHS PCT % 28   MONOS PCT % 10   EOS PCT % 1     Results from last 7 days   Lab Units 02/08/22  0446   SODIUM mmol/L 132*   POTASSIUM mmol/L 4 1   CHLORIDE mmol/L 100   CO2 mmol/L 25   BUN mg/dL 14   CREATININE mg/dL 1 03   ANION GAP mmol/L 7   CALCIUM mg/dL 9 8   ALBUMIN g/dL 2 2*   TOTAL BILIRUBIN mg/dL 0 24   ALK PHOS U/L 97   ALT U/L 18   AST U/L 19   GLUCOSE RANDOM mg/dL 122     Results from last 7 days   Lab Units 02/07/22  1320   INR  1 13             Results from last 7 days   Lab Units 02/08/22  0446 02/07/22  1538   LACTIC ACID mmol/L  --  1 1   PROCALCITONIN ng/ml 0 13  --        Lines/Drains:  Invasive Devices  Report    Peripheral Intravenous Line            Peripheral IV 02/08/22 Left Forearm <1 day                      Imaging: Reviewed radiology reports from this admission including: CT head    Recent Cultures (last 7 days):   Results from last 7 days   Lab Units 02/07/22  1537   BLOOD CULTURE  Received in Microbiology Lab  Culture in Progress  Received in Microbiology Lab  Culture in Progress  Last 24 Hours Medication List:   Current Facility-Administered Medications   Medication Dose Route Frequency Provider Last Rate    acetaminophen  650 mg Oral Q6H PRN Hina Phillip MD      cefTRIAXone  1,000 mg Intravenous Q24H Sofía Phillip MD 1,000 mg (02/08/22 1431)    ferrous sulfate  325 mg Oral Daily With Breakfast Lanetta Brakeman Lynden Halsted, MD      levETIRAcetam  500 mg Oral Daily Lanetta Brakeman Lynden Halsted, MD      lisinopril  20 mg Oral Daily Lanetta Brakeman Lynden Halsted, MD      LORazepam  0 5 mg Oral Q8H PRN Lanetta Brakeman Lynden Halsted, MD      pantoprazole  40 mg Oral Early Morning Lanetta Brakeman Lynden Halsted, MD      senna  1 tablet Oral Daily Sofía Phillip MD      sodium chloride (PF)  3 mL Intravenous Q1H PRN Sudhakar Snyder MD      traMADol  50 mg Oral Q6H PRN Lanetta Brakeman Lynden Halsted, MD          Today, Patient Was Seen By: Jeff Smith DO    **Please Note: This note may have been constructed using a voice recognition system  **

## 2022-02-08 NOTE — ASSESSMENT & PLAN NOTE
· From taking Dilantin for 30 years - discontinued 5 years ago and patient was switched to [employer] which has seemed to result in less issues with ataxia  · Followed by a Neurologist

## 2022-02-08 NOTE — PLAN OF CARE
Problem: OCCUPATIONAL THERAPY ADULT  Goal: Performs self-care activities at highest level of function for planned discharge setting  See evaluation for individualized goals  Description: Treatment Interventions: ADL retraining,Functional transfer training,UE strengthening/ROM,Endurance training,Cognitive reorientation,Patient/family training,Equipment evaluation/education,Neuromuscular reeducation,Compensatory technique education,Continued evaluation,Energy conservation,Activityengagement          See flowsheet documentation for full assessment, interventions and recommendations  Note: Limitation: Decreased ADL status,Decreased UE strength,Decreased Safe judgement during ADL,Decreased cognition,Decreased endurance,Decreased self-care trans,Decreased high-level ADLs  Prognosis: Good  Assessment: Pt is a 80 y o  female, admitted to [address] [place] [address] 2/7/2022 d/t experiencing multiple falls at home  Dx: ambulatory dysfunction  Pt with PMHx impacting their performance during ADL tasks, including: seizures, HTN, arthritis, anxiety, spinal stenosis  Prior to admission to the hospital Pt was performing ADLs without physical assistance  IADLs with physical assistance  Functional transfers/ambulation without physical assistance  Cognitive status was PTA was intact  OT order placed to assess Pt's ADLs, cognitive status, and performance during functional tasks in order to maximize safety and independence while making most appropriate d/c recommendations   Pt's clinical presentation is currently unstable/unpredictable given new onset deficits that effect Pt's occupational performance and ability to safely return to [facility] including decrease activity tolerance, decrease standing balance, decrease performance during ADL tasks, decrease cognition, decrease safety awareness , increase impulsiveness, decrease UB MS, decrease generalized strength, decrease activity engagement, decrease performance during functional transfers, steps to enter home, limited family support, frequent falls, high fall risk and limited insight to deficits combined with medical complications of hypertension , change in mental status, abnormal H&H, abnormal CBC, abnormal sodium values, impulsivity during admission, need for input for mobility technique/safety and elevated calcium levels  Personal factors affecting Pt at time of initial evaluation include: step(s) to enter environment, multi-level environment, availability as recommended, limited home support, advanced age, inability to perform current job functions, inability to perform IADLs, inability to perform ADLs, new need for AD, inability to ambulate household distances, inability to navigate community distances, limited insight into impairments, decreased initiation and engagement, difficulty communicating, recent fall(s)/fall history and questionable non-compliance  Pt will benefit from continued skilled OT services to address deficits as defined above and to maximize level independence/participation during ADLs and functional tasks to facilitate return toward PLOF and improved quality of life  From an occupational therapy standpoint, recommendation at time of d/c would be post acute rehab       OT Discharge Recommendation: Post acute rehabilitation services

## 2022-02-08 NOTE — PLAN OF CARE
Problem: PHYSICAL THERAPY ADULT  Goal: Performs mobility at highest level of function for planned discharge setting  See evaluation for individualized goals  Description: Treatment/Interventions: Functional transfer training,LE strengthening/ROM,Elevations,Therapeutic exercise,Endurance training,Cognitive reorientation,Patient/family training,Equipment eval/education,Bed mobility,Gait training,Compensatory technique education,Spoke to case management,Spoke to nursing,OT  Equipment Recommended:  (walker- pt has)       See flowsheet documentation for full assessment, interventions and recommendations  Note: Prognosis: Good  Problem List: Decreased strength,Decreased endurance,Decreased mobility,Impaired balance,Decreased coordination,Decreased cognition,Impaired judgement,Decreased safety awareness  Assessment: Pt is a 80 y o  female seen for PT evaluation s/p admission to [address] [place] [address] on 2/7/2022 with Ambulatory dysfunction  Order placed for PT services    Upon evaluation: Pt is presenting with impaired functional mobility due to decreased strength, decreased endurance, impaired balance, impaired coordination, gait deviations, impaired cognition, decreased safety awareness, impaired judgment and fall risk requiring supervision assistance for bed mobility, steadying assistance for transfers and steadying to stand by assistance for ambulation with RW  Pt's clinical presentation is currently unpredictable given the functional mobility deficits above, especially weakness, decreased endurance, impaired coordination, gait deviations, decreased activity tolerance, decreased functional mobility tolerance, decreased safety awareness, impaired judgement and decreased cognition, coupled with fall risks as indicated by AM-PAC 6-Clicks: 11/53 as well as hx of falls, impaired balance, polypharmacy, impaired coordination, impaired judgement and decreased safety awareness and combined with medical complications of abnormal H&H, abnormal sodium values, need for input for mobility technique/safety and lumbar spine imaging showing Severe degenerative disc disease L4-L5 and L5-S1, abnormal urinalysis with acute cystitis with hematuria, hypercalcemia   Pt's PMHx and comorbidities that may affect physical performance and progress include: HTN and spinal stenosis, ataxia, seizure disorder, anxiety, VERA, 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  Personal factors affecting pt at time of IE include: anxiety, inaccessible home environment, step(s) to enter environment, limited home support, advanced age, inability to perform IADLs, inability to perform ADLs, inability to navigate level surfaces without external assistance, inability to ambulate household distances, limited insight into impairments and recent fall(s)/fall history  Pt will benefit from continued skilled PT services to address deficits as defined above and to maximize level of functional mobility to facilitate return toward PLOF and improved QOL  From PT/mobility standpoint, recommendation at time of d/c would be Short term rehab pending progress in order to reduce fall risk and maximize pt's functional independence and consistency with mobility in order to facilitate return to PLOF  Recommend ther ex next 1-2 sessions  Barriers to Discharge: Decreased caregiver support  Barriers to Discharge Comments: lives alone, frequent falls, not ambulating household distances  PT Discharge Recommendation: Post acute rehabilitation services          See flowsheet documentation for full assessment

## 2022-02-08 NOTE — PLAN OF CARE
Problem: PHYSICAL THERAPY ADULT  Goal: Performs mobility at highest level of function for planned discharge setting  See evaluation for individualized goals  Description: Treatment/Interventions: Functional transfer training,LE strengthening/ROM,Elevations,Therapeutic exercise,Endurance training,Cognitive reorientation,Patient/family training,Equipment eval/education,Bed mobility,Gait training,Compensatory technique education,Spoke to case management,Spoke to nursing,OT  Equipment Recommended:  (walker- pt has)       See flowsheet documentation for full assessment, interventions and recommendations  5/[id number] by Bal Ramirez, PT  Note: Prognosis: Good  Problem List: Decreased strength,Decreased endurance,Decreased mobility,Impaired balance,Decreased coordination,Decreased cognition,Impaired judgement,Decreased safety awareness  Pt tolerated session fairly  She requires increased verbal cues for safety with mobility with RW to decrease risk for falls and improve gait pattern  She continues to require assistance with mobility due to impaired balance and safety concerns  She will continue to benefit from PT services to maximize LOF  Barriers to Discharge: Decreased caregiver support  Barriers to Discharge Comments: lives alone, frequent falls, not ambulating household distances  PT Discharge Recommendation: Post acute rehabilitation services          See flowsheet documentation for full assessment

## 2022-02-08 NOTE — PHYSICAL THERAPY NOTE
PHYSICAL THERAPY EVALUATION  NAME:  Wili Aldana  DATE: 02/08/22    AGE:   80 y o  Mrn:   [id number]  ADMIT DX:  Hypercalcemia [E83 52]  Sinusitis [J32 9]  UTI (urinary tract infection) [N39 0]  Weakness [R53 1]  Anemia [D64 9]  Frequent falls [R29 6]  Ambulatory dysfunction [R26 2]  Osteoarthritis of right knee, unspecified osteoarthritis type [M17 11]    Past Medical History:   Diagnosis Date    Arthritis     Hypertension      Length Of Stay: 1  Performed at least 2 patient identifiers during session: Name and Birthday  PHYSICAL THERAPY EVALUATION :      02/08/22 1104   PT Last Visit   PT Visit Date 02/08/22   Note Type   Note type Evaluation   Pain Assessment   Pain Assessment Tool 0-10   Pain Score No Pain   Pain Location/Orientation Orientation: Right;Location: Knee; Location: Leg   Restrictions/Precautions   Braces or Orthoses LE Braces  (R knee brace; [address] system)   Other Precautions Chair Alarm; Bed Alarm; Fall Risk;Pain   Home Living   Type of Home House  (3 AMEYA B HRs)   Home Layout Two level;Bed/bath upstairs  (FF, but has stair glide)   Bathroom Shower/Tub Tub/shower unit   Bathroom Toilet Standard   Bathroom Equipment Shower chair;Grab bars in [facility]; Jahaira Wray  (RW in the home, wheelchair in community)   Additional Comments Reports living in a [facility] with 3 AMEYA B HRs and stair glide to 2nd floor  Reports having a RW on both floors and using RW in the house and wheelchair outside the house,    Prior Function   Level of [place] Independent with ADLs and functional mobility   Lives With Alone   Receives Help From Family  ("Shaista Puentes")   ADL Assistance Independent   IADLs Needs assistance  (can complete light meal prep)   Falls in the last 6 months 1 to 4   Comments Reports being independent with RW, independent fro ADLs and has assistance for IADLs from "Shaista Puentes and her son"   General   Additional Pertinent History Pt with frequent falls at home      Cognition Orientation Level Oriented to person;Oriented to place;Oriented to situation  (place with cues  oriented to month, year with cues)   Following Commands Follows one step commands with increased time or repetition   Comments pt repetitive with statements and questions, requires repeated questions with alternate wording to fully comprehend what therapist asked patient regarding PLOF and home set up  Subjective   Subjective "My  probably has no clue where I am " (spouse residing in local SNF  RLE Assessment   RLE Assessment WFL  (4-/5)   LLE Assessment   LLE Assessment WFL  (just > neutral DF  pt reports h/o ortho sx to ankle)   Strength LLE   LLE Overall Strength 4-/5   Coordination   Movements are Fluid and Coordinated 0   Coordination and Movement Description impaired with swing through during ambulation R LE > L   Bed Mobility   Supine to Sit 5  Supervision   Additional items Increased time required;Verbal cues   Additional Comments HOB flat without bedrails  supervision to complete with inc time and effort  cues for technique  pt donned R knee brace sitting EOB with minimal assistance and cues from therapist for positioning of LE to don brace   Transfers   Sit to Stand   (steadying assistance)   Additional items Increased time required;Verbal cues   Stand to Sit   (steadying assistance)   Additional items Increased time required;Verbal cues   Stand pivot   (steadying assistance)   Additional items Increased time required;Verbal cues   Additional Comments use of RW  min cues for hand placement for safety with RW  sit<>stand with steadying assistance with wide HA  spt with RW with steadying assistance with verbal cues for turning completely prior to sitting and tostay within HA of RW when turning  stand to sit with steadying assistance for controlled descent  cues for hand placement  Ambulation/Elevation   Gait pattern Wide HA; Short stride; Excessively slow; Foward flexed; Ataxia   Gait Assistance (steadying to stand by assistance)   Additional items Assist x 1;Verbal cues   Assistive Device Rolling walker   Distance 13'x1 with RW with steadying to stand by assistance with wide HA, decreased step length, inc trunk flexion with verbal cues to stay within HA of RW for upright posture and for safe completion to not kick R uprihgt of RW  verbal cues for safe navigation of environment as pt tended to veer to right and catch RW on objects  Balance   Static Sitting Good   Dynamic Sitting Fair +   Static Standing Fair   Dynamic Standing Fair -   Ambulatory Fair -   Endurance Deficit   Endurance Deficit Description HR in 90s at rest, 105 bpm with activity   Activity Tolerance   Activity Tolerance Patient limited by fatigue   Medical Staff Made Aware Ramirez DEL RIO   Nurse Made Aware RN, David Prado   Assessment   Prognosis Good   Problem List Decreased strength;Decreased endurance;Decreased mobility; Impaired balance;Decreased coordination;Decreased cognition; Impaired judgement;Decreased safety awareness   Barriers to Discharge Decreased caregiver support   Barriers to Discharge Comments lives alone, frequent falls, not ambulating household distances  Goals   Patient Goals "Go when my  is if I get worse"   STG Expiration Date 02/22/22   PT Treatment Day 1   Plan   Treatment/Interventions Functional transfer training;LE strengthening/ROM; Elevations; Therapeutic exercise; Endurance training;Cognitive reorientation;Patient/family training;Equipment eval/education; Bed mobility;Gait training; Compensatory technique education;Spoke to case management;Spoke to nursing;OT   PT Frequency 3-5x/wk   Recommendation   PT Discharge Recommendation Post acute rehabilitation services   Equipment Recommended   (walker- pt has)   Additional Comments should patient or family decline post acute rehab, pt would require increased and consistent support from family/friends to return to home with recommendation for 1st floor set up with use of BSC and assistance with meals and HHPT  However, recommendation is post acute rehab given frequent falls and decreased safety with mobility with pt at an inc fall risk  AM-PAC Basic Mobility Inpatient   Turning in Bed Without Bedrails 3   Lying on Back to Sitting on Edge of Flat Bed 3   Moving Bed to Chair 3   Standing Up From Chair 3   Walk in Room 3   Climb 3-5 Stairs 2   Basic Mobility Inpatient Raw Score 17   Basic Mobility Standardized Score 39 67   Highest Level Of Mobility   -[facility] Goal 5: Stand one or more mins   -[facility] Highest Level of Mobility 7: Walk 25 feet or more   -[facility] Goal Achieved Yes   Additional Treatment Session   Start Time 1122   End Time 1132   Treatment Assessment Pt tolerated session fairly  She requires increased verbal cues for safety with mobility with RW to decrease risk for falls and improve gait pattern  She continues to require assistance with mobility due to impaired balance and safety concerns  She will continue to benefit from PT services to maximize LOF  Equipment Use use of RW  sit<>stand with steadying assistance  min cues for hand placement  stand to sit with stand by assistance wiht min cues for hhand placement  spt with RW with steadying assistance with mod verbal cues to stay within HA of RW and for positioning prior to sitting  dynamic standing balance without UE support with steadying assistance  ambulated 40'x1 with RW with steadying to stand by assistance with mod verbal cues for safety with RW for safe navigation of RW and to not kick RW with R LE during swing through, but to stay within HA of RW  pt with path deviation to right  completed 1x10 AROM hip flexion, LAQ and ankle DF/PF  End of Consult   Patient Position at End of Consult Bed/Chair alarm activated; All needs within reach  (reclined in recliner)     (Please find full objective findings from PT assessment regarding body systems outlined above)       Assessment: Pt is a 80 y o  female seen for PT evaluation s/p admission to [id number] [place] [address] on 2/7/2022 with Ambulatory dysfunction  Order placed for PT services  Upon evaluation: Pt is presenting with impaired functional mobility due to decreased strength, decreased endurance, impaired balance, impaired coordination, gait deviations, impaired cognition, decreased safety awareness, impaired judgment and fall risk requiring supervision assistance for bed mobility, steadying assistance for transfers and steadying to stand by assistance for ambulation with RW  Pt's clinical presentation is currently unpredictable given the functional mobility deficits above, especially weakness, decreased endurance, impaired coordination, gait deviations, decreased activity tolerance, decreased functional mobility tolerance, decreased safety awareness, impaired judgement and decreased cognition, coupled with fall risks as indicated by AM-PAC 6-Clicks: 31/31 as well as hx of falls, impaired balance, polypharmacy, impaired coordination, impaired judgement and decreased safety awareness and combined with medical complications of abnormal H&H, abnormal sodium values, need for input for mobility technique/safety and lumbar spine imaging showing Severe degenerative disc disease L4-L5 and L5-S1, abnormal urinalysis with acute cystitis with hematuria, hypercalcemia   Pt's PMHx and comorbidities that may affect physical performance and progress include: HTN and spinal stenosis, ataxia, seizure disorder, anxiety, VERA, 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  Personal factors affecting pt at time of IE include: anxiety, inaccessible home environment, step(s) to enter environment, limited home support, advanced age, inability to perform IADLs, inability to perform ADLs, inability to navigate level surfaces without external assistance, inability to ambulate household distances, limited insight into impairments and recent fall(s)/fall history   Pt will benefit from continued skilled PT services to address deficits as defined above and to maximize level of functional mobility to facilitate return toward PLOF and improved QOL  From PT/mobility standpoint, recommendation at time of d/c would be Short term rehab pending progress in order to reduce fall risk and maximize pt's functional independence and consistency with mobility in order to facilitate return to PLOF  Recommend ther ex next 1-2 sessions  The patient's AM-PAC Basic Mobility Inpatient Short Form Raw Score is 17  A Raw score of greater than 16 suggests the patient may benefit from discharge to home  Please also refer to the recommendation of the Physical Therapist for safe discharge planning  However patient with frequent falls and impaired safety  Goals: Pt will: Perform bed mobility tasks with modified I to reposition in bed and prepare for transfers  Pt will perform transfers with modified I to decrease burden of care, decrease risk for falls and improve activity tolerance and prepare for ambulation  Pt will ambulate with RW for >/= 100' with  modified I  to decrease burden of care, decrease risk for falls, improve activity tolerance and improve gait quality and to access home environment  Pt will complete >/= 3 steps with with bilateral handrails with steadying assistance to return to home with AMEYA, decrease risk for falls and improve activity tolerance  Pt will participate in objective balance assessment to determine baseline fall risk  Pt will increase B LE strength >/= 1/2 MMT grade to facilitate functional mobility        Vicki Iniguez, PT,DPT

## 2022-02-09 LAB
25(OH)D3 SERPL-MCNC: 24.9 NG/ML (ref 30–100)
ABO GROUP BLD: NORMAL
ABO GROUP BLD: NORMAL
ALBUMIN SERPL BCP-MCNC: 2 G/DL (ref 3.5–5)
ANION GAP SERPL CALCULATED.3IONS-SCNC: 9 MMOL/L (ref 4–13)
BACTERIA UR CULT: NORMAL
BLD GP AB SCN SERPL QL: NEGATIVE
BUN SERPL-MCNC: 15 MG/DL (ref 5–25)
CALCIUM ALBUM COR SERPL-MCNC: 11.5 MG/DL (ref 8.3–10.1)
CALCIUM PRE 500 MG CA PO UR-SCNC: 14.8 MG/DL
CALCIUM SERPL-MCNC: 9.9 MG/DL (ref 8.3–10.1)
CHLORIDE SERPL-SCNC: 101 MMOL/L (ref 100–108)
CO2 SERPL-SCNC: 24 MMOL/L (ref 21–32)
CREAT SERPL-MCNC: 0.96 MG/DL (ref 0.6–1.3)
ERYTHROCYTE [DISTWIDTH] IN BLOOD BY AUTOMATED COUNT: 18.6 % (ref 11.6–15.1)
GFR SERPL CREATININE-BSD FRML MDRD: 52 ML/MIN/1.73SQ M
GLUCOSE SERPL-MCNC: 121 MG/DL (ref 65–140)
HCT VFR BLD AUTO: 22.7 % (ref 34.8–46.1)
HGB BLD-MCNC: 6.5 G/DL (ref 11.5–15.4)
HGB RETIC QN AUTO: 20.3 PG (ref 30–38.3)
IMM RETICS NFR: 23.6 % (ref 0–14)
LDH SERPL-CCNC: 206 U/L (ref 81–234)
MAGNESIUM SERPL-MCNC: 1.8 MG/DL (ref 1.6–2.6)
MCH RBC QN AUTO: 19.6 PG (ref 26.8–34.3)
MCHC RBC AUTO-ENTMCNC: 28.6 G/DL (ref 31.4–37.4)
MCV RBC AUTO: 69 FL (ref 82–98)
PHOSPHATE SERPL-MCNC: 2.7 MG/DL (ref 2.3–4.1)
PLATELET # BLD AUTO: 521 THOUSANDS/UL (ref 149–390)
PMV BLD AUTO: 9.7 FL (ref 8.9–12.7)
POTASSIUM SERPL-SCNC: 4 MMOL/L (ref 3.5–5.3)
PROCALCITONIN SERPL-MCNC: 0.13 NG/ML
PTH-INTACT SERPL-MCNC: 6.4 PG/ML (ref 18.4–80.1)
RBC # BLD AUTO: 3.31 MILLION/UL (ref 3.81–5.12)
RETICS # AUTO: ABNORMAL 10*3/UL (ref 14097–95744)
RETICS # CALC: 1.69 % (ref 0.37–1.87)
RH BLD: NEGATIVE
RH BLD: NEGATIVE
SODIUM SERPL-SCNC: 134 MMOL/L (ref 136–145)
SPECIMEN EXPIRATION DATE: NORMAL
WBC # BLD AUTO: 9.82 THOUSAND/UL (ref 4.31–10.16)

## 2022-02-09 PROCEDURE — 86920 COMPATIBILITY TEST SPIN: CPT

## 2022-02-09 PROCEDURE — 99232 SBSQ HOSP IP/OBS MODERATE 35: CPT | Performed by: STUDENT IN AN ORGANIZED HEALTH CARE EDUCATION/TRAINING PROGRAM

## 2022-02-09 PROCEDURE — 83735 ASSAY OF MAGNESIUM: CPT | Performed by: STUDENT IN AN ORGANIZED HEALTH CARE EDUCATION/TRAINING PROGRAM

## 2022-02-09 PROCEDURE — 85027 COMPLETE CBC AUTOMATED: CPT | Performed by: STUDENT IN AN ORGANIZED HEALTH CARE EDUCATION/TRAINING PROGRAM

## 2022-02-09 PROCEDURE — 86850 RBC ANTIBODY SCREEN: CPT | Performed by: STUDENT IN AN ORGANIZED HEALTH CARE EDUCATION/TRAINING PROGRAM

## 2022-02-09 PROCEDURE — 84145 PROCALCITONIN (PCT): CPT | Performed by: STUDENT IN AN ORGANIZED HEALTH CARE EDUCATION/TRAINING PROGRAM

## 2022-02-09 PROCEDURE — 83615 LACTATE (LD) (LDH) ENZYME: CPT | Performed by: STUDENT IN AN ORGANIZED HEALTH CARE EDUCATION/TRAINING PROGRAM

## 2022-02-09 PROCEDURE — 83970 ASSAY OF PARATHORMONE: CPT | Performed by: STUDENT IN AN ORGANIZED HEALTH CARE EDUCATION/TRAINING PROGRAM

## 2022-02-09 PROCEDURE — 86900 BLOOD TYPING SEROLOGIC ABO: CPT | Performed by: STUDENT IN AN ORGANIZED HEALTH CARE EDUCATION/TRAINING PROGRAM

## 2022-02-09 PROCEDURE — P9016 RBC LEUKOCYTES REDUCED: HCPCS

## 2022-02-09 PROCEDURE — 85046 RETICYTE/HGB CONCENTRATE: CPT | Performed by: STUDENT IN AN ORGANIZED HEALTH CARE EDUCATION/TRAINING PROGRAM

## 2022-02-09 PROCEDURE — 80069 RENAL FUNCTION PANEL: CPT | Performed by: STUDENT IN AN ORGANIZED HEALTH CARE EDUCATION/TRAINING PROGRAM

## 2022-02-09 PROCEDURE — 86901 BLOOD TYPING SEROLOGIC RH(D): CPT | Performed by: STUDENT IN AN ORGANIZED HEALTH CARE EDUCATION/TRAINING PROGRAM

## 2022-02-09 PROCEDURE — 30233N1 TRANSFUSION OF NONAUTOLOGOUS RED BLOOD CELLS INTO PERIPHERAL VEIN, PERCUTANEOUS APPROACH: ICD-10-PCS | Performed by: STUDENT IN AN ORGANIZED HEALTH CARE EDUCATION/TRAINING PROGRAM

## 2022-02-09 RX ADMIN — LISINOPRIL 20 MG: 10 TABLET ORAL at 08:47

## 2022-02-09 RX ADMIN — CEFTRIAXONE 1000 MG: 1 INJECTION, SOLUTION INTRAVENOUS at 14:38

## 2022-02-09 RX ADMIN — TRAMADOL HYDROCHLORIDE 50 MG: 50 TABLET, FILM COATED ORAL at 23:42

## 2022-02-09 RX ADMIN — PANTOPRAZOLE SODIUM 40 MG: 40 TABLET, DELAYED RELEASE ORAL at 05:29

## 2022-02-09 RX ADMIN — STANDARDIZED SENNA CONCENTRATE 8.6 MG: 8.6 TABLET ORAL at 08:47

## 2022-02-09 RX ADMIN — FERROUS SULFATE TAB 325 MG (65 MG ELEMENTAL FE) 325 MG: 325 (65 FE) TAB at 08:47

## 2022-02-09 RX ADMIN — LEVETIRACETAM 500 MG: 500 TABLET, FILM COATED ORAL at 08:47

## 2022-02-09 NOTE — ASSESSMENT & PLAN NOTE
· UA positive  · Started on Ceftriaxone 1 g daily     · Procalcitonin negative x 2  · Will discontinue ceftriaxone (received 3 doses)  · UCX appears to be contaminated

## 2022-02-09 NOTE — PLAN OF CARE
Problem: Potential for Falls  Goal: Patient will remain free of falls  Description: INTERVENTIONS:  - Educate patient/family on patient safety including physical limitations  - Instruct patient to call for assistance with activity   - Consult OT/PT to assist with strengthening/mobility   - Keep Call bell within reach  - Keep bed low and locked with side rails adjusted as appropriate  - Keep care items and personal belongings within reach  - Initiate and maintain comfort rounds  - Make Fall Risk Sign visible to staff  - Offer Toileting every   Hours, in advance of need  - Initiate/Maintain   alarm  - Obtain necessary fall risk management equipment:   - Apply yellow socks and bracelet for high fall risk patients  - Consider moving patient to room near nurses station  Outcome: Progressing     Problem: Nutrition/Hydration-ADULT  Goal: Nutrient/Hydration intake appropriate for improving, restoring or maintaining nutritional needs  Description: Monitor and assess patient's nutrition/hydration status for malnutrition  Collaborate with interdisciplinary team and initiate plan and interventions as ordered  Monitor patient's weight and dietary intake as ordered or per policy  Utilize nutrition screening tool and intervene as necessary  Determine patient's food preferences and provide high-protein, high-caloric foods as appropriate       INTERVENTIONS:  - Monitor oral intake, urinary output, labs, and treatment plans  - Assess nutrition and hydration status and recommend course of action  - Evaluate amount of meals eaten  - Assist patient with eating if necessary   - Allow adequate time for meals  - Recommend/ encourage appropriate diets, oral nutritional supplements, and vitamin/mineral supplements  - Order, calculate, and assess calorie counts as needed  - Recommend, monitor, and adjust tube feedings and TPN/PPN based on assessed needs  - Assess need for intravenous fluids  - Provide specific nutrition/hydration education as appropriate  - Include patient/family/caregiver in decisions related to nutrition  Outcome: Progressing     Problem: MOBILITY - ADULT  Goal: Maintain or return to baseline ADL function  Description: INTERVENTIONS:  -  Assess patient's ability to carry out ADLs; assess patient's baseline for ADL function and identify physical deficits which impact ability to perform ADLs (bathing, care of mouth/teeth, toileting, grooming, dressing, etc )  - Assess/evaluate cause of self-care deficits   - Assess range of motion  - Assess patient's mobility; develop plan if impaired  - Assess patient's need for assistive devices and provide as appropriate  - Encourage maximum independence but intervene and supervise when necessary  - Involve family in performance of ADLs  - Assess for home care needs following discharge   - Consider OT consult to assist with ADL evaluation and planning for discharge  - Provide patient education as appropriate  Outcome: Progressing  Goal: Maintains/Returns to pre admission functional level  Description: INTERVENTIONS:  - Perform BMAT or MOVE assessment daily    - Set and communicate daily mobility goal to care team and patient/family/caregiver  - Collaborate with rehabilitation services on mobility goals if consulted  - Perform Range of Motion   times a day  - Reposition patient every   hours  - Dangle patient   times a day  - Stand patient   times a day  - Ambulate patient   times a day  - Out of bed to chair   times a day   - Out of bed for meals    times a day  - Out of bed for toileting  - Record patient progress and toleration of activity level   Outcome: Progressing     Problem: PAIN - ADULT  Goal: Verbalizes/displays adequate comfort level or baseline comfort level  Description: Interventions:  - Encourage patient to monitor pain and request assistance  - Assess pain using appropriate pain scale  - Administer analgesics based on type and severity of pain and evaluate response  - Implement non-pharmacological measures as appropriate and evaluate response  - Consider cultural and social influences on pain and pain management  - Notify physician/advanced practitioner if interventions unsuccessful or patient reports new pain  Outcome: Progressing     Problem: INFECTION - ADULT  Goal: Absence or prevention of progression during hospitalization  Description: INTERVENTIONS:  - Assess and monitor for signs and symptoms of infection  - Monitor lab/diagnostic results  - Monitor all insertion sites, i e  indwelling lines, tubes, and drains  - Monitor endotracheal if appropriate and nasal secretions for changes in amount and color  - [place] appropriate cooling/warming therapies per order  - Administer medications as ordered  - Instruct and encourage patient and family to use good hand hygiene technique  - Identify and instruct in appropriate isolation precautions for identified infection/condition  Outcome: Progressing  Goal: Absence of fever/infection during neutropenic period  Description: INTERVENTIONS:  - Monitor WBC    Outcome: Progressing     Problem: SAFETY ADULT  Goal: Patient will remain free of falls  Description: INTERVENTIONS:  - Educate patient/family on patient safety including physical limitations  - Instruct patient to call for assistance with activity   - Consult OT/PT to assist with strengthening/mobility   - Keep Call bell within reach  - Keep bed low and locked with side rails adjusted as appropriate  - Keep care items and personal belongings within reach  - Initiate and maintain comfort rounds  - Make Fall Risk Sign visible to staff  - Offer Toileting every   Hours, in advance of need  - Initiate/Maintain   alarm  - Obtain necessary fall risk management equipment:      - Apply yellow socks and bracelet for high fall risk patients  - Consider moving patient to room near nurses station  Outcome: Progressing  Goal: Maintain or return to baseline ADL function  Description: INTERVENTIONS:  - Assess patient's ability to carry out ADLs; assess patient's baseline for ADL function and identify physical deficits which impact ability to perform ADLs (bathing, care of mouth/teeth, toileting, grooming, dressing, etc )  - Assess/evaluate cause of self-care deficits   - Assess range of motion  - Assess patient's mobility; develop plan if impaired  - Assess patient's need for assistive devices and provide as appropriate  - Encourage maximum independence but intervene and supervise when necessary  - Involve family in performance of ADLs  - Assess for home care needs following discharge   - Consider OT consult to assist with ADL evaluation and planning for discharge  - Provide patient education as appropriate  Outcome: Progressing  Goal: Maintains/Returns to pre admission functional level  Description: INTERVENTIONS:  - Perform BMAT or MOVE assessment daily    - Set and communicate daily mobility goal to care team and patient/family/caregiver  - Collaborate with rehabilitation services on mobility goals if consulted  - Perform Range of Motion   times a day  - Reposition patient every   hours  - Dangle patient   times a day  - Stand patient   times a day  - Ambulate patient   times a day  - Out of bed to chair   times a day   - Out of bed for meals    times a day  - Out of bed for toileting  - Record patient progress and toleration of activity level   Outcome: Progressing     Problem: DISCHARGE PLANNING  Goal: Discharge to home or other facility with appropriate resources  Description: INTERVENTIONS:  - Identify barriers to discharge w/patient and caregiver  - Arrange for needed discharge resources and transportation as appropriate  - Identify discharge learning needs (meds, wound care, etc )  - Arrange for interpretive services to assist at discharge as needed  - Refer to Case Management Department for coordinating discharge planning if the patient needs post-hospital services based on physician/advanced practitioner order or complex needs related to functional status, cognitive ability, or social support system  Outcome: Progressing     Problem: Knowledge Deficit  Goal: Patient/family/caregiver demonstrates understanding of disease process, treatment plan, medications, and discharge instructions  Description: Complete learning assessment and assess knowledge base    Interventions:  - Provide teaching at level of understanding  - Provide teaching via preferred learning methods  Outcome: Progressing     Problem: METABOLIC, FLUID AND ELECTROLYTES - ADULT  Goal: Electrolytes maintained within normal limits  Description: INTERVENTIONS:  - Monitor labs and assess patient for signs and symptoms of electrolyte imbalances  - Administer electrolyte replacement as ordered  - Monitor response to electrolyte replacements, including repeat lab results as appropriate  - Instruct patient on fluid and nutrition as appropriate  Outcome: Progressing  Goal: Fluid balance maintained  Description: INTERVENTIONS:  - Monitor labs   - Monitor I/O and WT  - Instruct patient on fluid and nutrition as appropriate  - Assess for signs & symptoms of volume excess or deficit  Outcome: Progressing  Goal: Glucose maintained within target range  Description: INTERVENTIONS:  - Monitor Blood Glucose as ordered  - Assess for signs and symptoms of hyperglycemia and hypoglycemia  - Administer ordered medications to maintain glucose within target range  - Assess nutritional intake and initiate nutrition service referral as needed  Outcome: Progressing     Problem: HEMATOLOGIC - ADULT  Goal: Maintains hematologic stability  Description: INTERVENTIONS  - Assess for signs and symptoms of bleeding or hemorrhage  - Monitor labs  - Administer supportive blood products/factors as ordered and appropriate  Outcome: Progressing     Problem: MUSCULOSKELETAL - ADULT  Goal: Maintain or return mobility to safest level of function  Description: INTERVENTIONS:  - Assess patient's ability to carry out ADLs; assess patient's baseline for ADL function and identify physical deficits which impact ability to perform ADLs (bathing, care of mouth/teeth, toileting, grooming, dressing, etc )  - Assess/evaluate cause of self-care deficits   - Assess range of motion  - Assess patient's mobility  - Assess patient's need for assistive devices and provide as appropriate  - Encourage maximum independence but intervene and supervise when necessary  - Involve family in performance of ADLs  - Assess for home care needs following discharge   - Consider OT consult to assist with ADL evaluation and planning for discharge  - Provide patient education as appropriate  Outcome: Progressing  Goal: Maintain proper alignment of affected body part  Description: INTERVENTIONS:  - Support, maintain and protect limb and body alignment  - Provide patient/ family with appropriate education  Outcome: Progressing

## 2022-02-09 NOTE — PROGRESS NOTES
[address]  Progress Note - [place] 2/6/1932, 80 y o  female MRN: [id number]  Unit/Bed#: -01 Encounter: [id number]  Primary Care Provider: Jaky Cruz MD   Date and time admitted to hospital: 2/7/2022 12:46 PM    * Ambulatory dysfunction  Assessment & Plan  · Falling more frequently at home (in the last 3 months), multiple calls to EMS  · Likely secondary to bad arthritis of bilateral knees  · PT and OT consulted for evaluation  · Consult to case management also placed for possible long-term placement - recommend rehab  · xrays negative for any fractures    Acute cystitis with hematuria  Assessment & Plan  · UA positive  · Started on Ceftriaxone 1 g daily     · Procalcitonin negative x 2  · Will discontinue ceftriaxone (received 3 doses)  · UCX appears to be contaminated    Hyponatremia  Assessment & Plan  · Presents with sodium of 132, likely secondary to poor po intake  · Improving with increased po intake    Hypercalcemia  Assessment & Plan  · Patient presents with Ca of 12 1, unclear etiology  · Obtain PTH, PTH-rp, V25(OH)D, 1,25 dihydroxyvitamin D levels  · Obtain urinary calcium level  · Improving with IVF most likely 2/2 poor po intake    Anxiety  Assessment & Plan  · Patient on Lorazepam 0 5 mg prn anxiety    Iron deficiency anemia  Assessment & Plan  · Baseline hgb is 7 2 (lowest it's been); last check on Jan was at 7 5  · Continue ferrous sulfate 325 mg daily  · Iron panel shows anemia of chronic disease  · No history of GI bleed however patient has refused endoscopies in the past  · Hg noted to be low today at 6 5, no over signs of bleeding  · Ordered 1 unit prbc  · Ordered FOBT     Seizure disorder (HCC)  Assessment & Plan  · Continue Keppra 500 mg BID - recently decreased to once daily  · Last seizure was years ago    Ataxia  Assessment & Plan  · From taking Dilantin for 30 years - discontinued 5 years ago and patient was switched to [employer] which has seemed to result in less issues with ataxia  · Followed by a Neurologist    Spinal stenosis  Assessment & Plan  · H/o spinal stenosis  · Stable at this time  · Continue outpatient f/u    Arthritis  Assessment & Plan  · Continue PTA Tramadol 50 mg q6h prn pain    Essential hypertension  Assessment & Plan  · Continue on PTA Lisinopril 20 mg daily          VTE Pharmacologic Prophylaxis:   Moderate Risk (Score 3-4) - Pharmacological DVT Prophylaxis Contraindicated  Sequential Compression Devices Ordered  Patient Centered Rounds: I performed bedside rounds with nursing staff today  Discussions with Specialists or Other Care Team Provider: case management    Education and Discussions with Family / Patient: Updated  (son) via phone  Time Spent for Care: 30 minutes  More than 50% of total time spent on counseling and coordination of care as described above  Current Length of Stay: 2 day(s)  Current Patient Status: Inpatient   Certification Statement: The patient will continue to require additional inpatient hospital stay due to anemia  Discharge Plan: Anticipate discharge in 48 hrs to rehab facility  Code Status: Level 3 - DNAR and DNI    Subjective:   Patient seen examined at bedside  This morning hemoglobin noted to be a 6 5   1 unit PRBC has been ordered  Patient states that she feels little tired aside from being tight has no other complaints    Objective:     Vitals:   Temp (24hrs), Av 8 °F (37 1 °C), Min:98 3 °F (36 8 °C), Max:99 4 °F (37 4 °C)    Temp:  [98 3 °F (36 8 °C)-99 4 °F (37 4 °C)] 98 7 °F (37 1 °C)  HR:  [] 87  Resp:  [16-28] 20  BP: (123-129)/(51-69) 124/51  SpO2:  [93 %-97 %] 96 %  Body mass index is 29 25 kg/m²  Input and Output Summary (last 24 hours): Intake/Output Summary (Last 24 hours) at 2022 1659  Last data filed at 2022 1442  Gross per 24 hour   Intake 1070 ml   Output 920 ml   Net 150 ml       Physical Exam:   Physical Exam  Vitals reviewed  HENT:      Head: Normocephalic and atraumatic  Right Ear: External ear normal       Left Ear: External ear normal       Nose: Nose normal       Mouth/Throat:      Mouth: Mucous membranes are moist       Pharynx: Oropharynx is clear  Eyes:      Extraocular Movements: Extraocular movements intact  Cardiovascular:      Rate and Rhythm: Normal rate and regular rhythm  Pulses: Normal pulses  Heart sounds: Normal heart sounds  Pulmonary:      Effort: Pulmonary effort is normal       Breath sounds: Normal breath sounds  Abdominal:      General: Abdomen is flat  Palpations: Abdomen is soft  Tenderness: There is no abdominal tenderness  Musculoskeletal:         General: Normal range of motion  Cervical back: Normal range of motion  Skin:     General: Skin is warm and dry  Neurological:      General: No focal deficit present  Mental Status: She is alert  Psychiatric:         Mood and Affect: Mood normal           Additional Data:     Labs:  Results from last 7 days   Lab Units 02/09/22 0619 02/08/22 0446 02/08/22 0446   WBC Thousand/uL 9 82   < > 11 69*   HEMOGLOBIN g/dL 6 5*   < > 7 1*   HEMATOCRIT % 22 7*   < > 25 2*   PLATELETS Thousands/uL 521*   < > 586*   NEUTROS PCT %  --   --  60   LYMPHS PCT %  --   --  28   MONOS PCT %  --   --  10   EOS PCT %  --   --  1    < > = values in this interval not displayed  Results from last 7 days   Lab Units 02/09/22 0619 02/08/22 0446 02/08/22 0446   SODIUM mmol/L 134*   < > 132*   POTASSIUM mmol/L 4 0   < > 4 1   CHLORIDE mmol/L 101   < > 100   CO2 mmol/L 24   < > 25   BUN mg/dL 15   < > 14   CREATININE mg/dL 0 96   < > 1 03   ANION GAP mmol/L 9   < > 7   CALCIUM mg/dL 9 9   < > 9 8   ALBUMIN g/dL 2 0*   < > 2 2*   TOTAL BILIRUBIN mg/dL  --   --  0 24   ALK PHOS U/L  --   --  97   ALT U/L  --   --  18   AST U/L  --   --  19   GLUCOSE RANDOM mg/dL 121   < > 122    < > = values in this interval not displayed       Results from last 7 days   Lab Units 02/07/22  1320   INR  1 13             Results from last 7 days   Lab Units 02/09/22  0619 02/08/22  0446 02/07/22  1538   LACTIC ACID mmol/L  --   --  1 1   PROCALCITONIN ng/ml 0 13 0 13  --        Lines/Drains:  Invasive Devices  Report    Peripheral Intravenous Line            Peripheral IV 02/09/22 Left Forearm <1 day                      Imaging: No pertinent imaging reviewed  Recent Cultures (last 7 days):   Results from last 7 days   Lab Units 02/07/22  1537 02/07/22  1320   BLOOD CULTURE  No Growth at 24 hrs  No Growth at 24 hrs   --    URINE CULTURE   --  Culture too young- will reincubate       Last 24 Hours Medication List:   Current Facility-Administered Medications   Medication Dose Route Frequency Provider Last Rate    acetaminophen  650 mg Oral Q6H PRN Clorinda Gissel Phillip MD      cefTRIAXone  1,000 mg Intravenous Q24H Pasqual Ee Katelynn Phillip MD 1,000 mg (02/09/22 1438)    ferrous sulfate  325 mg Oral Daily With Breakfast Pasqual Ee Molly Bragg MD      levETIRAcetam  500 mg Oral Daily Pasqual Ee Molly Bragg MD      lisinopril  20 mg Oral Daily Pasqual Ee Molly Bragg MD      LORazepam  0 5 mg Oral Q8H PRN Pasqual Ee Molly Bragg MD      pantoprazole  40 mg Oral Early Morning Pasqual Ee Molly Bragg MD      senna  1 tablet Oral Daily Pasqual Ee Maria Del Rosario Phillip MD      sodium chloride (PF)  3 mL Intravenous Q1H PRN Amado Sweeney MD      traMADol  50 mg Oral Q6H PRN Pasqual Ee Molly Bragg MD          Today, Patient Was Seen By: Jose F Vallejo DO    **Please Note: This note may have been constructed using a voice recognition system  **

## 2022-02-09 NOTE — ASSESSMENT & PLAN NOTE
· From taking Dilantin for 30 years - discontinued 5 years ago and patient was switched to [employer] which has seemed to result in less issues with ataxia  · Followed by a Neurologist

## 2022-02-09 NOTE — ASSESSMENT & PLAN NOTE
· Presents with sodium of 132, likely secondary to poor po intake  · Improving with increased po intake

## 2022-02-09 NOTE — ASSESSMENT & PLAN NOTE
· Baseline hgb is 7 2 (lowest it's been); last check on Jan was at 7 5  · Continue ferrous sulfate 325 mg daily  · Iron panel shows anemia of chronic disease  · No history of GI bleed however patient has refused endoscopies in the past  · Hg noted to be low today at 6 5, no over signs of bleeding  · Ordered 1 unit prbc  · Ordered FOBT

## 2022-02-10 ENCOUNTER — APPOINTMENT (INPATIENT)
Dept: RADIOLOGY | Facility: HOSPITAL | Age: 87
DRG: 092 | End: 2022-02-10
Payer: COMMERCIAL

## 2022-02-10 LAB
ABO GROUP BLD BPU: NORMAL
ALBUMIN SERPL BCP-MCNC: 2 G/DL (ref 3.5–5)
ANION GAP SERPL CALCULATED.3IONS-SCNC: 9 MMOL/L (ref 4–13)
BPU ID: NORMAL
BUN SERPL-MCNC: 15 MG/DL (ref 5–25)
CALCIUM ALBUM COR SERPL-MCNC: 11.6 MG/DL (ref 8.3–10.1)
CALCIUM SERPL-MCNC: 10 MG/DL (ref 8.3–10.1)
CHLORIDE SERPL-SCNC: 101 MMOL/L (ref 100–108)
CO2 SERPL-SCNC: 24 MMOL/L (ref 21–32)
CREAT SERPL-MCNC: 0.95 MG/DL (ref 0.6–1.3)
CROSSMATCH: NORMAL
ERYTHROCYTE [DISTWIDTH] IN BLOOD BY AUTOMATED COUNT: 20.6 % (ref 11.6–15.1)
GFR SERPL CREATININE-BSD FRML MDRD: 52 ML/MIN/1.73SQ M
GLUCOSE SERPL-MCNC: 118 MG/DL (ref 65–140)
HCT VFR BLD AUTO: 26.8 % (ref 34.8–46.1)
HGB BLD-MCNC: 7.9 G/DL (ref 11.5–15.4)
MAGNESIUM SERPL-MCNC: 1.8 MG/DL (ref 1.6–2.6)
MCH RBC QN AUTO: 20.6 PG (ref 26.8–34.3)
MCHC RBC AUTO-ENTMCNC: 29.5 G/DL (ref 31.4–37.4)
MCV RBC AUTO: 70 FL (ref 82–98)
PHOSPHATE SERPL-MCNC: 2.8 MG/DL (ref 2.3–4.1)
PLATELET # BLD AUTO: 501 THOUSANDS/UL (ref 149–390)
PMV BLD AUTO: 9.7 FL (ref 8.9–12.7)
POTASSIUM SERPL-SCNC: 3.8 MMOL/L (ref 3.5–5.3)
RBC # BLD AUTO: 3.83 MILLION/UL (ref 3.81–5.12)
SODIUM SERPL-SCNC: 134 MMOL/L (ref 136–145)
UNIT DISPENSE STATUS: NORMAL
UNIT PRODUCT CODE: NORMAL
UNIT PRODUCT VOLUME: 350 ML
UNIT RH: NORMAL
WBC # BLD AUTO: 9.46 THOUSAND/UL (ref 4.31–10.16)

## 2022-02-10 PROCEDURE — 99232 SBSQ HOSP IP/OBS MODERATE 35: CPT | Performed by: STUDENT IN AN ORGANIZED HEALTH CARE EDUCATION/TRAINING PROGRAM

## 2022-02-10 PROCEDURE — 74022 RADEX COMPL AQT ABD SERIES: CPT

## 2022-02-10 PROCEDURE — 82272 OCCULT BLD FECES 1-3 TESTS: CPT | Performed by: STUDENT IN AN ORGANIZED HEALTH CARE EDUCATION/TRAINING PROGRAM

## 2022-02-10 PROCEDURE — 83735 ASSAY OF MAGNESIUM: CPT | Performed by: STUDENT IN AN ORGANIZED HEALTH CARE EDUCATION/TRAINING PROGRAM

## 2022-02-10 PROCEDURE — 97116 GAIT TRAINING THERAPY: CPT

## 2022-02-10 PROCEDURE — 85027 COMPLETE CBC AUTOMATED: CPT | Performed by: STUDENT IN AN ORGANIZED HEALTH CARE EDUCATION/TRAINING PROGRAM

## 2022-02-10 PROCEDURE — 80069 RENAL FUNCTION PANEL: CPT | Performed by: STUDENT IN AN ORGANIZED HEALTH CARE EDUCATION/TRAINING PROGRAM

## 2022-02-10 PROCEDURE — 82607 VITAMIN B-12: CPT | Performed by: STUDENT IN AN ORGANIZED HEALTH CARE EDUCATION/TRAINING PROGRAM

## 2022-02-10 PROCEDURE — 97110 THERAPEUTIC EXERCISES: CPT

## 2022-02-10 PROCEDURE — 82746 ASSAY OF FOLIC ACID SERUM: CPT | Performed by: STUDENT IN AN ORGANIZED HEALTH CARE EDUCATION/TRAINING PROGRAM

## 2022-02-10 RX ORDER — SODIUM CHLORIDE 9 MG/ML
100 INJECTION, SOLUTION INTRAVENOUS CONTINUOUS
Status: DISCONTINUED | OUTPATIENT
Start: 2022-02-10 | End: 2022-02-11

## 2022-02-10 RX ORDER — POLYETHYLENE GLYCOL 3350 17 G/17G
17 POWDER, FOR SOLUTION ORAL ONCE
Status: COMPLETED | OUTPATIENT
Start: 2022-02-10 | End: 2022-02-10

## 2022-02-10 RX ORDER — AMOXICILLIN 250 MG
2 CAPSULE ORAL
Status: DISCONTINUED | OUTPATIENT
Start: 2022-02-10 | End: 2022-02-12 | Stop reason: HOSPADM

## 2022-02-10 RX ADMIN — DOCUSATE SODIUM AND SENNOSIDES 2 TABLET: 8.6; 5 TABLET ORAL at 22:29

## 2022-02-10 RX ADMIN — LISINOPRIL 20 MG: 10 TABLET ORAL at 07:53

## 2022-02-10 RX ADMIN — PANTOPRAZOLE SODIUM 40 MG: 40 TABLET, DELAYED RELEASE ORAL at 06:35

## 2022-02-10 RX ADMIN — IRON SUCROSE 300 MG: 20 INJECTION, SOLUTION INTRAVENOUS at 17:39

## 2022-02-10 RX ADMIN — POLYETHYLENE GLYCOL 3350 17 G: 17 POWDER, FOR SOLUTION ORAL at 09:57

## 2022-02-10 RX ADMIN — SODIUM CHLORIDE 100 ML/HR: 0.9 INJECTION, SOLUTION INTRAVENOUS at 16:43

## 2022-02-10 RX ADMIN — LEVETIRACETAM 500 MG: 500 TABLET, FILM COATED ORAL at 07:53

## 2022-02-10 RX ADMIN — FERROUS SULFATE TAB 325 MG (65 MG ELEMENTAL FE) 325 MG: 325 (65 FE) TAB at 07:53

## 2022-02-10 RX ADMIN — STANDARDIZED SENNA CONCENTRATE 8.6 MG: 8.6 TABLET ORAL at 07:54

## 2022-02-10 NOTE — ASSESSMENT & PLAN NOTE
· Baseline hgb is 7 2 (lowest it's been); last check on Jan was at 7 5  · Continue ferrous sulfate 325 mg daily  · Iron panel shows anemia of chronic disease  · No history of GI bleed however patient has refused endoscopies in the past  · Hg noted to be low 2/9 at 6 5, no over signs of bleeding  · Ordered 1 unit prbc  · Performed JOSIE today and sent stool sample to assess for blood note to have hemmrhoids  · Also ordered xray obstruction series as patient has not had bowel movement  · Hg today stable 7 9

## 2022-02-10 NOTE — PROGRESS NOTES
[address] r[address]ee Young  Progress Note - [facility] 2/6/1932, 80 y o  female MRN: [id number]  Unit/Bed#: -01 Encounter: [id number]  Primary Care Provider: Germaine Leyva MD   Date and time admitted to hospital: 2/7/2022 12:46 PM    * Ambulatory dysfunction  Assessment & Plan  · Falling more frequently at home (in the last 3 months), multiple calls to EMS  · Likely secondary to bad arthritis of bilateral knees  · PT and OT consulted for evaluation  · Consult to case management also placed for possible long-term placement - recommend rehab  · xrays negative for any fractures    Acute cystitis with hematuria  Assessment & Plan  · UA positive  · Started on Ceftriaxone 1 g daily     · Procalcitonin negative x 2  · Will discontinue ceftriaxone (received 3 doses)  · UCX appears to be contaminated    Hyponatremia  Assessment & Plan  · Presents with sodium of 132, likely secondary to poor po intake  · Improving with increased po intake at 134    Hypercalcemia  Assessment & Plan  · Patient presents with Ca of 12 1, unclear etiology  · Obtain PTH decreased at 6 5 , PTH-rp pendign, V25(OH)D slightly low at 24, urinary calcium level normal at 14 8  · Improved with IVF  · Suspect elevated calcium is 2/2 dehydration    Anxiety  Assessment & Plan  · Patient on Lorazepam 0 5 mg prn anxiety    Iron deficiency anemia  Assessment & Plan  · Baseline hgb is 7 2 (lowest it's been); last check on Jan was at 7 5  · Continue ferrous sulfate 325 mg daily  · Iron panel shows anemia of chronic disease  · No history of GI bleed however patient has refused endoscopies in the past  · Hg noted to be low 2/9 at 6 5, no over signs of bleeding  · Ordered 1 unit prbc  · Performed JOSIE today and sent stool sample to assess for blood note to have hemmrhoids  · Also ordered xray obstruction series as patient has not had bowel movement  · Hg today stable 7 9    Seizure disorder (HCC)  Assessment & Plan  · Continue Keppra 500 mg BID - recently decreased to once daily  · Last seizure was years ago    Ataxia  Assessment & Plan  · From taking Dilantin for 30 years - discontinued 5 years ago and patient was switched to [place] which has seemed to result in less issues with ataxia  · Followed by a Neurologist    Spinal stenosis  Assessment & Plan  · H/o spinal stenosis  · Stable at this time  · Continue outpatient f/u    Arthritis  Assessment & Plan  · Continue PTA Tramadol 50 mg q6h prn pain    Essential hypertension  Assessment & Plan  · Continue on PTA Lisinopril 20 mg daily        VTE Pharmacologic Prophylaxis:   Moderate Risk (Score 3-4) - Pharmacological DVT Prophylaxis Contraindicated  Sequential Compression Devices Ordered  Patient Centered Rounds: I performed bedside rounds with nursing staff today  Discussions with Specialists or Other Care Team Provider: case management    Education and Discussions with Family / Patient: Updated  (son) via phone  Time Spent for Care: 30 minutes  More than 50% of total time spent on counseling and coordination of care as described above  Current Length of Stay: 3 day(s)  Current Patient Status: Inpatient   Certification Statement: The patient will continue to require additional inpatient hospital stay due to placement  Discharge Plan: Anticipate discharge tomorrow to rehab facility  Code Status: Level 3 - DNAR and DNI    Subjective:   Patient seen and examined at bedside  No acute events over night  Pt has not had bowel movement, gave miralax today  Also ordered obstruction series  Pt currently has no complaints  Objective:     Vitals:   Temp (24hrs), Av °F (37 2 °C), Min:98 6 °F (37 °C), Max:99 2 °F (37 3 °C)    Temp:  [98 6 °F (37 °C)-99 2 °F (37 3 °C)] 98 6 °F (37 °C)  HR:  [] 92  Resp:  [18] 18  BP: (133-147)/(58-72) 133/64  SpO2:  [94 %-97 %] 97 %  Body mass index is 29 59 kg/m²  Input and Output Summary (last 24 hours):      Intake/Output Summary (Last 24 hours) at 2/10/2022 1539  Last data filed at 2/10/2022 1512  Gross per 24 hour   Intake 660 ml   Output 352 ml   Net 308 ml       Physical Exam:   Physical Exam  Vitals reviewed  HENT:      Head: Normocephalic and atraumatic  Right Ear: External ear normal       Left Ear: External ear normal       Nose: Nose normal       Mouth/Throat:      Mouth: Mucous membranes are moist       Pharynx: Oropharynx is clear  Eyes:      Extraocular Movements: Extraocular movements intact  Cardiovascular:      Rate and Rhythm: Normal rate and regular rhythm  Pulses: Normal pulses  Heart sounds: Normal heart sounds  Pulmonary:      Effort: Pulmonary effort is normal       Breath sounds: Normal breath sounds  Abdominal:      General: Abdomen is flat  Palpations: Abdomen is soft  Tenderness: There is no abdominal tenderness  Musculoskeletal:         General: Normal range of motion  Cervical back: Normal range of motion  Skin:     General: Skin is warm and dry  Neurological:      General: No focal deficit present  Mental Status: She is alert  Psychiatric:         Mood and Affect: Mood normal           Additional Data:     Labs:  Results from last 7 days   Lab Units 02/10/22  0642 02/09/22  0619 02/08/22  0446   WBC Thousand/uL 9 46   < > 11 69*   HEMOGLOBIN g/dL 7 9*   < > 7 1*   HEMATOCRIT % 26 8*   < > 25 2*   PLATELETS Thousands/uL 501*   < > 586*   NEUTROS PCT %  --   --  60   LYMPHS PCT %  --   --  28   MONOS PCT %  --   --  10   EOS PCT %  --   --  1    < > = values in this interval not displayed       Results from last 7 days   Lab Units 02/10/22  0642 02/09/22  0619 02/08/22  0446   SODIUM mmol/L 134*   < > 132*   POTASSIUM mmol/L 3 8   < > 4 1   CHLORIDE mmol/L 101   < > 100   CO2 mmol/L 24   < > 25   BUN mg/dL 15   < > 14   CREATININE mg/dL 0 95   < > 1 03   ANION GAP mmol/L 9   < > 7   CALCIUM mg/dL 10 0   < > 9 8   ALBUMIN g/dL 2 0*   < > 2 2*   TOTAL BILIRUBIN mg/dL  --   -- 0  24   ALK PHOS U/L  --   --  97   ALT U/L  --   --  18   AST U/L  --   --  19   GLUCOSE RANDOM mg/dL 118   < > 122    < > = values in this interval not displayed  Results from last 7 days   Lab Units 02/07/22  1320   INR  1 13             Results from last 7 days   Lab Units 02/09/22  0619 02/08/22  0446 02/07/22  1538   LACTIC ACID mmol/L  --   --  1 1   PROCALCITONIN ng/ml 0 13 0 13  --        Lines/Drains:  Invasive Devices  Report    Peripheral Intravenous Line            Peripheral IV 02/09/22 Left Forearm 1 day                      Imaging: Reviewed radiology reports from this admission including: CT head    Recent Cultures (last 7 days):   Results from last 7 days   Lab Units 02/07/22  1537 02/07/22  1320   BLOOD CULTURE  No Growth at 48 hrs  No Growth at 48 hrs  --    URINE CULTURE   --  >100,000 cfu/ml        Last 24 Hours Medication List:   Current Facility-Administered Medications   Medication Dose Route Frequency Provider Last Rate    acetaminophen  650 mg Oral Q6H PRN Colon Given Ike Masters MD      ferrous sulfate  325 mg Oral Daily With Breakfast Colon Given Ike Masters MD      levETIRAcetam  500 mg Oral Daily Colon Given Ike Masters MD      lisinopril  20 mg Oral Daily Colon Given Ike Masters MD      LORazepam  0 5 mg Oral Q8H PRN Colon Given Ike Masters MD      pantoprazole  40 mg Oral Early Morning Colon Given Ike Masters MD      senna  1 tablet Oral Daily Colon Given Maria Del Rosario Phillip MD      sodium chloride (PF)  3 mL Intravenous Q1H PRN Agustin Nix MD      traMADol  50 mg Oral Q6H PRN Colon Given Ike Masters MD          Today, Patient Was Seen By: Brianne Strauss DO    **Please Note: This note may have been constructed using a voice recognition system  **

## 2022-02-10 NOTE — PHYSICAL THERAPY NOTE
PHYSICAL THERAPY TREATMENT NOTE  NAME:  Arin Aldana  DATE: 02/10/22    Length Of Stay: 3  Performed at least 2 patient identifiers during session: Name and Birthday    TREATMENT:    02/10/22 1120   PT Last Visit   PT Visit Date 02/10/22   Note Type   Note Type Treatment   Pain Assessment   Pain Assessment Tool 0-10   Pain Score No Pain   Restrictions/Precautions   Braces or Orthoses LE Braces  (R knee brace)   Other Precautions Chair Alarm; Bed Alarm; Fall Risk   General   Chart Reviewed Yes   Response to Previous Treatment Patient reporting fatigue but able to participate  Family/Caregiver Present No   Cognition   Arousal/Participation Alert; Responsive; Cooperative   Orientation Level Oriented X4   Following Commands Follows one step commands with increased time or repetition   Subjective   Subjective "They want to send me where my  is"   Bed Mobility   Sit to Supine   (sba)   Additional items Assist x 1; Increased time required;Verbal cues   Additional Comments Pt seated in recliner at start of session and requiring sba for sit->supine with vc'ing for sequencing and technique  Increased time required to achieve getting BLE into bed  Transfers   Sit to Stand   (sba)   Additional items Increased time required;Verbal cues   Stand to Sit   (SBA)   Additional items Increased time required;Verbal cues   Stand pivot   (steadying)   Additional items Increased time required;Verbal cues   Additional Comments Use of RW for all transfers with R knee brace donned with modAx1  SBA for sit<>stand with vc'ing for hand placement for safety and techniqiue  Steadying assist for SPT with vc'ing to turn completely prior to sitting on EOB  Ambulation/Elevation   Gait pattern Wide HA; Forward Flexion; Short stride; Excessively slow   Gait Assistance   (sba)   Additional items Assist x 1;Verbal cues   Assistive Device Rolling walker   Distance 40'x1 with RW and SBA with vc'ing to step into HA of RW, upright posture and increase step length  Pt able to navigate around wires at end of bed with one instance of vc'ing for safe ambulation  Balance   Static Sitting Good   Dynamic Sitting Fair +   Static Standing Fair   Dynamic Standing Fair -   Ambulatory Fair -   Activity Tolerance   Activity Tolerance Patient limited by fatigue   Nurse Made Aware JEFERSON Madrigal   Exercises   Hamstring Sets Sitting;15 reps;AROM; Bilateral  (GTB)   Hip Flexion Sitting;15 reps;AROM; Bilateral   Hip Abduction Sitting;15 reps;AROM; Bilateral  (GTB)   Hip Adduction Sitting;15 reps;AROM; Bilateral   Knee AROM Long Arc Quad Sitting;15 reps;AROM; Bilateral   Ankle Pumps Sitting;15 reps;AROM; Bilateral   Assessment   Prognosis Good   Problem List Decreased strength;Decreased endurance;Decreased mobility; Impaired balance;Decreased coordination;Decreased cognition; Impaired judgement;Decreased safety awareness   Barriers to Discharge Decreased caregiver support   Barriers to Discharge Comments Lives alone, frequent falls   Goals   Patient Goals "Leave"   PT Treatment Day 2   Plan   Treatment/Interventions Functional transfer training;LE strengthening/ROM; Elevations; Endurance training; Therapeutic exercise;Patient/family training;Equipment eval/education; Bed mobility;Gait training; Compensatory technique education;Spoke to nursing;OT   Progress Slow progress, decreased activity tolerance   PT Frequency 3-5x/wk   Recommendation   PT Discharge Recommendation Post acute rehabilitation services   Equipment Recommended   (TBD by rehab)   AM-PAC Basic Mobility Inpatient   Turning in Bed Without Bedrails 3   Lying on Back to Sitting on Edge of Flat Bed 3   Moving Bed to Chair 3   Standing Up From Chair 3   Walk in Room 3   Climb 3-5 Stairs 2   Basic Mobility Inpatient Raw Score 17   Basic Mobility Standardized Score 39 67   Highest Level Of Mobility   JH-HLM Goal 5: Stand one or more mins   JH-HLM Highest Level of Mobility 7: Walk 25 feet or more   JH-HLM Goal Achieved Yes Education   Education Provided Mobility training;Home exercise program;Assistive device   Patient Demonstrates acceptance/verbal understanding   End of Consult   Patient Position at End of Consult Supine;Bed/Chair alarm activated; All needs within reach     The patient's AM-PAC Basic Mobility Inpatient Short Form Raw Score is 17  A Raw score of greater than 16 suggests the patient may benefit from discharge to home  Please also refer to the recommendation of the Physical Therapist for safe discharge planning  Pt seen for PT treatment session this date with interventions consisting of gait training w/ emphasis on improving pt's ability to ambulate level surfaces x 40' with SBA provided by therapist with RW, Therapeutic exercise consisting of: AROM 15 reps B LE in sitting position and therapeutic activity consisting of training: bed mobility, supine<>sit transfers, sit<>stand transfers and stand pivot transfers  Pt agreeable to PT treatment session upon arrival, pt found seated OOB in recliner, in no apparent distress  In comparison to previous session, pt with improvements in ambulation distances with decreased vc'ing required for obstacle navigation  Pt requires min vc'ing for technique with therex  Pt continues to be limited by decreased activity tolerance and safety awareness, weakness, and balance deficits  Post session: pt returned BTB, bed alarm engaged and all needs in reach Continue to recommend STR at time of d/c in order to maximize pt's functional independence and safety w/ mobility  Pt continues to be functioning below baseline level, and remains limited 2* factors listed above  PT will continue to see pt while here in order to address the deficits listed above and provide interventions consistent w/ POC in effort to achieve STGs      Nan Zamora, PTA

## 2022-02-10 NOTE — CASE MANAGEMENT
Case Management Progress Note    Patient name Daphne Carrier  Location /-01 MRN [id number]  : 1932 Date 2/10/2022       LOS (days): 3  Geometric Mean LOS (GMLOS) (days): 3 00  Days to GMLOS:0 3        OBJECTIVE:        Current admission status: Inpatient  Preferred Pharmacy:   [address] [place] [id number]  Phone: [phone] Fax: [phone]    Primary Care Provider: Abeba Rodas MD    Primary Insurance: [facility]  Secondary Insurance:     PROGRESS NOTE:    Patient discussed in axel Oneal dc 22  Medicare #  [id number] , copy faxed to [facility] for bed placement     Cm to start [address] once bed is confirmed   Cullen simmons confirmed bed available tomorrow2 22    Cm stated auth with [address] via [facility] # [id number]  -- Facility Auth #  [id number]  Call back update on 2/15/22 per  Genaro kevin at [employer]

## 2022-02-10 NOTE — ASSESSMENT & PLAN NOTE
· Patient presents with Ca of 12 1, unclear etiology  · Obtain PTH decreased at 6 5 , PTH-rp pendign, V25(OH)D slightly low at 24, urinary calcium level normal at 14 8  · Improved with IVF  · Suspect elevated calcium is 2/2 dehydration

## 2022-02-10 NOTE — ASSESSMENT & PLAN NOTE
· From taking Dilantin for 30 years - discontinued 5 years ago and patient was switched to [place] Dic[place] which has seemed to result in less issues with ataxia  · Followed by a Neurologist

## 2022-02-10 NOTE — PLAN OF CARE
Problem: PHYSICAL THERAPY ADULT  Goal: Performs mobility at highest level of function for planned discharge setting  See evaluation for individualized goals  Description: Treatment/Interventions: Functional transfer training,LE strengthening/ROM,Elevations,Therapeutic exercise,Endurance training,Cognitive reorientation,Patient/family training,Equipment eval/education,Bed mobility,Gait training,Compensatory technique education,Spoke to case management,Spoke to nursing,OT  Equipment Recommended:  (walker- pt has)       See flowsheet documentation for full assessment, interventions and recommendations  Outcome: Progressing  Note: Prognosis: Good  Problem List: Decreased strength,Decreased endurance,Decreased mobility,Impaired balance,Decreased coordination,Decreased cognition,Impaired judgement,Decreased safety awareness  Assessment: Pt seen for PT treatment session this date with interventions consisting of gait training w/ emphasis on improving pt's ability to ambulate level surfaces x 40' with SBA provided by therapist with RW, Therapeutic exercise consisting of: AROM 15 reps B LE in sitting position and therapeutic activity consisting of training: bed mobility, supine<>sit transfers, sit<>stand transfers and stand pivot transfers  Pt agreeable to PT treatment session upon arrival, pt found seated OOB in recliner, in no apparent distress  In comparison to previous session, pt with improvements in ambulation distances with decreased vc'ing required for obstacle navigation  Pt requires min vc'ing for technique with therex  Pt continues to be limited by decreased activity tolerance and safety awareness, weakness, and balance deficits  Post session: pt returned BTB, bed alarm engaged and all needs in reach Continue to recommend STR at time of d/c in order to maximize pt's functional independence and safety w/ mobility  Pt continues to be functioning below baseline level, and remains limited 2* factors listed above  PT will continue to see pt while here in order to address the deficits listed above and provide interventions consistent w/ POC in effort to achieve STGs  Barriers to Discharge: Decreased caregiver support  Barriers to Discharge Comments: Lives alone, frequent falls     PT Discharge Recommendation: Post acute rehabilitation services          See flowsheet documentation for full assessment

## 2022-02-10 NOTE — PLAN OF CARE
Problem: Potential for Falls  Goal: Patient will remain free of falls  Description: INTERVENTIONS:  - Educate patient/family on patient safety including physical limitations  - Instruct patient to call for assistance with activity   - Consult OT/PT to assist with strengthening/mobility   - Keep Call bell within reach  - Keep bed low and locked with side rails adjusted as appropriate  - Keep care items and personal belongings within reach  - Initiate and maintain comfort rounds  - Make Fall Risk Sign visible to staff  - Offer Toileting every  Hours, in advance of need  - Initiate/Maintain alarm  - Obtain necessary fall risk management equipment:   - Apply yellow socks and bracelet for high fall risk patients  - Consider moving patient to room near nurses station  Outcome: Progressing     Problem: MOBILITY - ADULT  Goal: Maintain or return to baseline ADL function  Description: INTERVENTIONS:  -  Assess patient's ability to carry out ADLs; assess patient's baseline for ADL function and identify physical deficits which impact ability to perform ADLs (bathing, care of mouth/teeth, toileting, grooming, dressing, etc )  - Assess/evaluate cause of self-care deficits   - Assess range of motion  - Assess patient's mobility; develop plan if impaired  - Assess patient's need for assistive devices and provide as appropriate  - Encourage maximum independence but intervene and supervise when necessary  - Involve family in performance of ADLs  - Assess for home care needs following discharge   - Consider OT consult to assist with ADL evaluation and planning for discharge  - Provide patient education as appropriate  Outcome: Progressing  Goal: Maintains/Returns to pre admission functional level  Description: INTERVENTIONS:  - Perform BMAT or MOVE assessment daily    - Set and communicate daily mobility goal to care team and patient/family/caregiver     - Collaborate with rehabilitation services on mobility goals if consulted  - Perform Range of Motion  times a day  - Reposition patient every  hours    - Dangle patient  times a day  - Stand patient  times a day  - Ambulate patient  times a day  - Out of bed to chair  times a day   - Out of bed for meals  times a day  - Out of bed for toileting  - Record patient progress and toleration of activity level   Outcome: Progressing     Problem: SAFETY ADULT  Goal: Patient will remain free of falls  Description: INTERVENTIONS:  - Educate patient/family on patient safety including physical limitations  - Instruct patient to call for assistance with activity   - Consult OT/PT to assist with strengthening/mobility   - Keep Call bell within reach  - Keep bed low and locked with side rails adjusted as appropriate  - Keep care items and personal belongings within reach  - Initiate and maintain comfort rounds  - Make Fall Risk Sign visible to staff  - Offer Toileting every  Hours, in advance of need  - Initiate/Maintain alarm  - Obtain necessary fall risk management equipment:  - Apply yellow socks and bracelet for high fall risk patients  - Consider moving patient to room near nurses station  Outcome: Progressing  Goal: Maintain or return to baseline ADL function  Description: INTERVENTIONS:  -  Assess patient's ability to carry out ADLs; assess patient's baseline for ADL function and identify physical deficits which impact ability to perform ADLs (bathing, care of mouth/teeth, toileting, grooming, dressing, etc )  - Assess/evaluate cause of self-care deficits   - Assess range of motion  - Assess patient's mobility; develop plan if impaired  - Assess patient's need for assistive devices and provide as appropriate  - Encourage maximum independence but intervene and supervise when necessary  - Involve family in performance of ADLs  - Assess for home care needs following discharge   - Consider OT consult to assist with ADL evaluation and planning for discharge  - Provide patient education as appropriate  Outcome: Progressing  Goal: Maintains/Returns to pre admission functional level  Description: INTERVENTIONS:  - Perform BMAT or MOVE assessment daily    - Set and communicate daily mobility goal to care team and patient/family/caregiver  - Collaborate with rehabilitation services on mobility goals if consulted  - Perform Range of Motion  times a day  - Reposition patient every  hours    - Dangle patient  times a day  - Stand patient  times a day  - Ambulate patient  times a day  - Out of bed to chair  times a day   - Out of bed for meals  times a day  - Out of bed for toileting  - Record patient progress and toleration of activity level   Outcome: Progressing

## 2022-02-10 NOTE — ASSESSMENT & PLAN NOTE
· Falling more frequently at home (in the last 3 months), multiple calls to EMS  · Likely secondary to bad arthritis of bilateral knees  · PT and OT consulted for evaluation  · Consult to case management also placed for possible long-term placement - recommend rehab  · xrays negative for any fractures coagulation(Bleeding disorder R/T clinical cond/anti-coags)/other/syncope

## 2022-02-10 NOTE — PLAN OF CARE
Problem: Potential for Falls  Goal: Patient will remain free of falls  Description: INTERVENTIONS:  - Educate patient/family on patient safety including physical limitations  - Instruct patient to call for assistance with activity   - Consult OT/PT to assist with strengthening/mobility   - Keep Call bell within reach  - Keep bed low and locked with side rails adjusted as appropriate  - Keep care items and personal belongings within reach  - Initiate and maintain comfort rounds  - Make Fall Risk Sign visible to staff  - Offer Toileting every 2 Hours, in advance of need  - Initiate/Maintain bed/chair alarm  - Obtain necessary fall risk management equipment  - Apply yellow socks and bracelet for high fall risk patients  - Consider moving patient to room near nurses station  Outcome: Progressing     Problem: Nutrition/Hydration-ADULT  Goal: Nutrient/Hydration intake appropriate for improving, restoring or maintaining nutritional needs  Description: Monitor and assess patient's nutrition/hydration status for malnutrition  Collaborate with interdisciplinary team and initiate plan and interventions as ordered  Monitor patient's weight and dietary intake as ordered or per policy  Utilize nutrition screening tool and intervene as necessary  Determine patient's food preferences and provide high-protein, high-caloric foods as appropriate       INTERVENTIONS:  - Monitor oral intake, urinary output, labs, and treatment plans  - Assess nutrition and hydration status and recommend course of action  - Evaluate amount of meals eaten  - Assist patient with eating if necessary   - Allow adequate time for meals  - Recommend/ encourage appropriate diets, oral nutritional supplements, and vitamin/mineral supplements  - Order, calculate, and assess calorie counts as needed  - Recommend, monitor, and adjust tube feedings and TPN/PPN based on assessed needs  - Assess need for intravenous fluids  - Provide specific nutrition/hydration education as appropriate  - Include patient/family/caregiver in decisions related to nutrition  Outcome: Progressing     Problem: MOBILITY - ADULT  Goal: Maintain or return to baseline ADL function  Description: INTERVENTIONS:  -  Assess patient's ability to carry out ADLs; assess patient's baseline for ADL function and identify physical deficits which impact ability to perform ADLs (bathing, care of mouth/teeth, toileting, grooming, dressing, etc )  - Assess/evaluate cause of self-care deficits   - Assess range of motion  - Assess patient's mobility; develop plan if impaired  - Assess patient's need for assistive devices and provide as appropriate  - Encourage maximum independence but intervene and supervise when necessary  - Involve family in performance of ADLs  - Assess for home care needs following discharge   - Consider OT consult to assist with ADL evaluation and planning for discharge  - Provide patient education as appropriate  Outcome: Progressing  Goal: Maintains/Returns to pre admission functional level  Description: INTERVENTIONS:  - Perform BMAT or MOVE assessment daily    - Set and communicate daily mobility goal to care team and patient/family/caregiver  - Collaborate with rehabilitation services on mobility goals if consulted  - Perform Range of Motion - times a day  - Reposition patient every - hours    - Dangle patient - times a day  - Stand patient - times a day  - Ambulate patient - times a day  - Out of bed to chair - times a day   - Out of bed for meals 3 times a day  - Out of bed for toileting  - Record patient progress and toleration of activity level   Outcome: Progressing     Problem: PAIN - ADULT  Goal: Verbalizes/displays adequate comfort level or baseline comfort level  Description: Interventions:  - Encourage patient to monitor pain and request assistance  - Assess pain using appropriate pain scale  - Administer analgesics based on type and severity of pain and evaluate response  - Implement non-pharmacological measures as appropriate and evaluate response  - Consider cultural and social influences on pain and pain management  - Notify physician/advanced practitioner if interventions unsuccessful or patient reports new pain  Outcome: Progressing     Problem: INFECTION - ADULT  Goal: Absence or prevention of progression during hospitalization  Description: INTERVENTIONS:  - Assess and monitor for signs and symptoms of infection  - Monitor lab/diagnostic results  - Monitor all insertion sites, i e  indwelling lines, tubes, and drains  - Monitor endotracheal if appropriate and nasal secretions for changes in amount and color  - [place] appropriate cooling/warming therapies per order  - Administer medications as ordered  - Instruct and encourage patient and family to use good hand hygiene technique  - Identify and instruct in appropriate isolation precautions for identified infection/condition  Outcome: Progressing  Goal: Absence of fever/infection during neutropenic period  Description: INTERVENTIONS:  - Monitor WBC    Outcome: Progressing     Problem: SAFETY ADULT  Goal: Patient will remain free of falls  Description: INTERVENTIONS:  - Educate patient/family on patient safety including physical limitations  - Instruct patient to call for assistance with activity   - Consult OT/PT to assist with strengthening/mobility   - Keep Call bell within reach  - Keep bed low and locked with side rails adjusted as appropriate  - Keep care items and personal belongings within reach  - Initiate and maintain comfort rounds  - Make Fall Risk Sign visible to staff  - Offer Toileting every 2 Hours, in advance of need  - Initiate/Maintain bed/chair alarm  - Obtain necessary fall risk management equipment  - Apply yellow socks and bracelet for high fall risk patients  - Consider moving patient to room near nurses station  Outcome: Progressing  Goal: Maintain or return to baseline ADL function  Description: INTERVENTIONS:  -  Assess patient's ability to carry out ADLs; assess patient's baseline for ADL function and identify physical deficits which impact ability to perform ADLs (bathing, care of mouth/teeth, toileting, grooming, dressing, etc )  - Assess/evaluate cause of self-care deficits   - Assess range of motion  - Assess patient's mobility; develop plan if impaired  - Assess patient's need for assistive devices and provide as appropriate  - Encourage maximum independence but intervene and supervise when necessary  - Involve family in performance of ADLs  - Assess for home care needs following discharge   - Consider OT consult to assist with ADL evaluation and planning for discharge  - Provide patient education as appropriate  Outcome: Progressing  Goal: Maintains/Returns to pre admission functional level  Description: INTERVENTIONS:  - Perform BMAT or MOVE assessment daily    - Set and communicate daily mobility goal to care team and patient/family/caregiver  - Collaborate with rehabilitation services on mobility goals if consulted  - Perform Range of Motion - times a day  - Reposition patient every - hours    - Dangle patient - times a day  - Stand patient - times a day  - Ambulate patient - times a day  - Out of bed to chair - times a day   - Out of bed for meals 3 times a day  - Out of bed for toileting  - Record patient progress and toleration of activity level   Outcome: Progressing     Problem: DISCHARGE PLANNING  Goal: Discharge to home or other facility with appropriate resources  Description: INTERVENTIONS:  - Identify barriers to discharge w/patient and caregiver  - Arrange for needed discharge resources and transportation as appropriate  - Identify discharge learning needs (meds, wound care, etc )  - Arrange for interpretive services to assist at discharge as needed  - Refer to Case Management Department for coordinating discharge planning if the patient needs post-hospital services based on physician/advanced practitioner order or complex needs related to functional status, cognitive ability, or social support system  Outcome: Progressing     Problem: Knowledge Deficit  Goal: Patient/family/caregiver demonstrates understanding of disease process, treatment plan, medications, and discharge instructions  Description: Complete learning assessment and assess knowledge base    Interventions:  - Provide teaching at level of understanding  - Provide teaching via preferred learning methods  Outcome: Progressing     Problem: METABOLIC, FLUID AND ELECTROLYTES - ADULT  Goal: Electrolytes maintained within normal limits  Description: INTERVENTIONS:  - Monitor labs and assess patient for signs and symptoms of electrolyte imbalances  - Administer electrolyte replacement as ordered  - Monitor response to electrolyte replacements, including repeat lab results as appropriate  - Instruct patient on fluid and nutrition as appropriate  Outcome: Progressing  Goal: Fluid balance maintained  Description: INTERVENTIONS:  - Monitor labs   - Monitor I/O and WT  - Instruct patient on fluid and nutrition as appropriate  - Assess for signs & symptoms of volume excess or deficit  Outcome: Progressing  Goal: Glucose maintained within target range  Description: INTERVENTIONS:  - Monitor Blood Glucose as ordered  - Assess for signs and symptoms of hyperglycemia and hypoglycemia  - Administer ordered medications to maintain glucose within target range  - Assess nutritional intake and initiate nutrition service referral as needed  Outcome: Progressing     Problem: HEMATOLOGIC - ADULT  Goal: Maintains hematologic stability  Description: INTERVENTIONS  - Assess for signs and symptoms of bleeding or hemorrhage  - Monitor labs  - Administer supportive blood products/factors as ordered and appropriate  Outcome: Progressing     Problem: MUSCULOSKELETAL - ADULT  Goal: Maintain or return mobility to safest level of function  Description: INTERVENTIONS:  - Assess patient's ability to carry out ADLs; assess patient's baseline for ADL function and identify physical deficits which impact ability to perform ADLs (bathing, care of mouth/teeth, toileting, grooming, dressing, etc )  - Assess/evaluate cause of self-care deficits   - Assess range of motion  - Assess patient's mobility  - Assess patient's need for assistive devices and provide as appropriate  - Encourage maximum independence but intervene and supervise when necessary  - Involve family in performance of ADLs  - Assess for home care needs following discharge   - Consider OT consult to assist with ADL evaluation and planning for discharge  - Provide patient education as appropriate  Outcome: Progressing  Goal: Maintain proper alignment of affected body part  Description: INTERVENTIONS:  - Support, maintain and protect limb and body alignment  - Provide patient/ family with appropriate education  Outcome: Progressing     Problem: Prexisting or High Potential for Compromised Skin Integrity  Goal: Skin integrity is maintained or improved  Description: INTERVENTIONS:  - Identify patients at risk for skin breakdown  - Assess and monitor skin integrity  - Assess and monitor nutrition and hydration status  - Monitor labs   - Assess for incontinence   - Turn and reposition patient  - Assist with mobility/ambulation  - Relieve pressure over bony prominences  - Avoid friction and shearing  - Provide appropriate hygiene as needed including keeping skin clean and dry  - Evaluate need for skin moisturizer/barrier cream  - Collaborate with interdisciplinary team   - Patient/family teaching  - Consider wound care consult   Outcome: Progressing

## 2022-02-11 ENCOUNTER — TELEPHONE (OUTPATIENT)
Dept: HEMATOLOGY ONCOLOGY | Facility: CLINIC | Age: 87
End: 2022-02-11

## 2022-02-11 PROBLEM — E87.1 HYPONATREMIA: Status: RESOLVED | Noted: 2022-02-07 | Resolved: 2022-02-11

## 2022-02-11 LAB
1,25(OH)2D3 SERPL-MCNC: 35 PG/ML (ref 19.9–79.3)
ALBUMIN SERPL BCP-MCNC: 1.8 G/DL (ref 3.5–5)
ANION GAP SERPL CALCULATED.3IONS-SCNC: 9 MMOL/L (ref 4–13)
BUN SERPL-MCNC: 13 MG/DL (ref 5–25)
CALCIUM ALBUM COR SERPL-MCNC: 11.5 MG/DL (ref 8.3–10.1)
CALCIUM SERPL-MCNC: 9.7 MG/DL (ref 8.3–10.1)
CHLORIDE SERPL-SCNC: 102 MMOL/L (ref 100–108)
CO2 SERPL-SCNC: 25 MMOL/L (ref 21–32)
CREAT SERPL-MCNC: 0.97 MG/DL (ref 0.6–1.3)
ERYTHROCYTE [DISTWIDTH] IN BLOOD BY AUTOMATED COUNT: 21.1 % (ref 11.6–15.1)
FOLATE SERPL-MCNC: 17.6 NG/ML (ref 3.1–17.5)
GFR SERPL CREATININE-BSD FRML MDRD: 51 ML/MIN/1.73SQ M
GLUCOSE SERPL-MCNC: 114 MG/DL (ref 65–140)
HCT VFR BLD AUTO: 26.3 % (ref 34.8–46.1)
HGB BLD-MCNC: 7.6 G/DL (ref 11.5–15.4)
MAGNESIUM SERPL-MCNC: 1.7 MG/DL (ref 1.6–2.6)
MCH RBC QN AUTO: 20.5 PG (ref 26.8–34.3)
MCHC RBC AUTO-ENTMCNC: 28.9 G/DL (ref 31.4–37.4)
MCV RBC AUTO: 71 FL (ref 82–98)
PHOSPHATE SERPL-MCNC: 2.6 MG/DL (ref 2.3–4.1)
PLATELET # BLD AUTO: 465 THOUSANDS/UL (ref 149–390)
PMV BLD AUTO: 9.4 FL (ref 8.9–12.7)
POTASSIUM SERPL-SCNC: 3.7 MMOL/L (ref 3.5–5.3)
RBC # BLD AUTO: 3.7 MILLION/UL (ref 3.81–5.12)
SODIUM SERPL-SCNC: 136 MMOL/L (ref 136–145)
VIT B12 SERPL-MCNC: 574 PG/ML (ref 100–900)
WBC # BLD AUTO: 9.27 THOUSAND/UL (ref 4.31–10.16)

## 2022-02-11 PROCEDURE — 99232 SBSQ HOSP IP/OBS MODERATE 35: CPT | Performed by: STUDENT IN AN ORGANIZED HEALTH CARE EDUCATION/TRAINING PROGRAM

## 2022-02-11 PROCEDURE — 80069 RENAL FUNCTION PANEL: CPT | Performed by: STUDENT IN AN ORGANIZED HEALTH CARE EDUCATION/TRAINING PROGRAM

## 2022-02-11 PROCEDURE — 99239 HOSP IP/OBS DSCHRG MGMT >30: CPT | Performed by: STUDENT IN AN ORGANIZED HEALTH CARE EDUCATION/TRAINING PROGRAM

## 2022-02-11 PROCEDURE — 30233N1 TRANSFUSION OF NONAUTOLOGOUS RED BLOOD CELLS INTO PERIPHERAL VEIN, PERCUTANEOUS APPROACH: ICD-10-PCS | Performed by: STUDENT IN AN ORGANIZED HEALTH CARE EDUCATION/TRAINING PROGRAM

## 2022-02-11 PROCEDURE — 85027 COMPLETE CBC AUTOMATED: CPT | Performed by: STUDENT IN AN ORGANIZED HEALTH CARE EDUCATION/TRAINING PROGRAM

## 2022-02-11 PROCEDURE — 83735 ASSAY OF MAGNESIUM: CPT | Performed by: STUDENT IN AN ORGANIZED HEALTH CARE EDUCATION/TRAINING PROGRAM

## 2022-02-11 PROCEDURE — P9016 RBC LEUKOCYTES REDUCED: HCPCS

## 2022-02-11 RX ORDER — AMOXICILLIN 250 MG
2 CAPSULE ORAL
Qty: 60 TABLET | Refills: 0 | Status: SHIPPED | OUTPATIENT
Start: 2022-02-11 | End: 2022-03-13

## 2022-02-11 RX ORDER — FERROUS SULFATE 325(65) MG
325 TABLET ORAL 3 TIMES WEEKLY
Status: DISCONTINUED | OUTPATIENT
Start: 2022-02-14 | End: 2022-02-11

## 2022-02-11 RX ORDER — POLYETHYLENE GLYCOL 3350 17 G/17G
17 POWDER, FOR SOLUTION ORAL ONCE
Status: DISCONTINUED | OUTPATIENT
Start: 2022-02-11 | End: 2022-02-12 | Stop reason: HOSPADM

## 2022-02-11 RX ORDER — MAGNESIUM SULFATE HEPTAHYDRATE 40 MG/ML
2 INJECTION, SOLUTION INTRAVENOUS ONCE
Status: COMPLETED | OUTPATIENT
Start: 2022-02-11 | End: 2022-02-11

## 2022-02-11 RX ORDER — PANTOPRAZOLE SODIUM 40 MG/1
40 TABLET, DELAYED RELEASE ORAL
Qty: 30 TABLET | Refills: 0 | Status: SHIPPED | OUTPATIENT
Start: 2022-02-12 | End: 2022-03-14

## 2022-02-11 RX ORDER — FERROUS SULFATE 325(65) MG
325 TABLET ORAL 3 TIMES WEEKLY
Qty: 12 TABLET | Refills: 0 | Status: SHIPPED | OUTPATIENT
Start: 2022-02-11 | End: 2022-03-13

## 2022-02-11 RX ADMIN — DOCUSATE SODIUM AND SENNOSIDES 2 TABLET: 8.6; 5 TABLET ORAL at 20:37

## 2022-02-11 RX ADMIN — PANTOPRAZOLE SODIUM 40 MG: 40 TABLET, DELAYED RELEASE ORAL at 05:19

## 2022-02-11 RX ADMIN — IRON SUCROSE 300 MG: 20 INJECTION, SOLUTION INTRAVENOUS at 12:30

## 2022-02-11 RX ADMIN — ACETAMINOPHEN 650 MG: 325 TABLET ORAL at 20:37

## 2022-02-11 RX ADMIN — LEVETIRACETAM 500 MG: 500 TABLET, FILM COATED ORAL at 10:00

## 2022-02-11 RX ADMIN — LISINOPRIL 20 MG: 10 TABLET ORAL at 10:00

## 2022-02-11 RX ADMIN — MAGNESIUM SULFATE HEPTAHYDRATE 2 G: 40 INJECTION, SOLUTION INTRAVENOUS at 10:01

## 2022-02-11 NOTE — ASSESSMENT & PLAN NOTE
· From taking Dilantin for 30 years - discontinued 5 years ago and patient was switched to [employer] which has seemed to result in less issues with ataxia  · Followed by a Neurologist

## 2022-02-11 NOTE — PLAN OF CARE
Problem: Potential for Falls  Goal: Patient will remain free of falls  Description: INTERVENTIONS:  - Educate patient/family on patient safety including physical limitations  - Instruct patient to call for assistance with activity   - Consult OT/PT to assist with strengthening/mobility   - Keep Call bell within reach  - Keep bed low and locked with side rails adjusted as appropriate  - Keep care items and personal belongings within reach  - Initiate and maintain comfort rounds  - Make Fall Risk Sign visible to staff  - Apply yellow socks and bracelet for high fall risk patients  - Consider moving patient to room near nurses station  Outcome: Completed     Problem: Nutrition/Hydration-ADULT  Goal: Nutrient/Hydration intake appropriate for improving, restoring or maintaining nutritional needs  Description: Monitor and assess patient's nutrition/hydration status for malnutrition  Collaborate with interdisciplinary team and initiate plan and interventions as ordered  Monitor patient's weight and dietary intake as ordered or per policy  Utilize nutrition screening tool and intervene as necessary  Determine patient's food preferences and provide high-protein, high-caloric foods as appropriate       INTERVENTIONS:  - Monitor oral intake, urinary output, labs, and treatment plans  - Assess nutrition and hydration status and recommend course of action  - Evaluate amount of meals eaten  - Assist patient with eating if necessary   - Allow adequate time for meals  - Recommend/ encourage appropriate diets, oral nutritional supplements, and vitamin/mineral supplements  - Order, calculate, and assess calorie counts as needed  - Recommend, monitor, and adjust tube feedings and TPN/PPN based on assessed needs  - Assess need for intravenous fluids  - Provide specific nutrition/hydration education as appropriate  - Include patient/family/caregiver in decisions related to nutrition  Outcome: Completed     Problem: MOBILITY - ADULT  Goal: Maintain or return to baseline ADL function  Description: INTERVENTIONS:  - Educate patient/family on patient safety including physical limitations  - Instruct patient to call for assistance with activity   - Consult OT/PT to assist with strengthening/mobility   - Keep Call bell within reach  - Keep bed low and locked with side rails adjusted as appropriate  - Keep care items and personal belongings within reach  - Initiate and maintain comfort rounds  - Apply yellow socks and bracelet for high fall risk patients  - Consider moving patient to room near nurses station  Outcome: Completed  Goal: Maintains/Returns to pre admission functional level  Description: INTERVENTIONS:  -  Assess patient's ability to carry out ADLs; assess patient's baseline for ADL function and identify physical deficits which impact ability to perform ADLs (bathing, care of mouth/teeth, toileting, grooming, dressing, etc )  - Assess/evaluate cause of self-care deficits   - Assess range of motion  - Assess patient's mobility; develop plan if impaired  - Assess patient's need for assistive devices and provide as appropriate  - Encourage maximum independence but intervene and supervise when necessary  - Involve family in performance of ADLs  - Assess for home care needs following discharge   - Consider OT consult to assist with ADL evaluation and planning for discharge  - Provide patient education as appropriate  Outcome: Completed     Problem: PAIN - ADULT  Goal: Verbalizes/displays adequate comfort level or baseline comfort level  Description: Interventions:  - Encourage patient to monitor pain and request assistance  - Assess pain using appropriate pain scale  - Administer analgesics based on type and severity of pain and evaluate response  - Implement non-pharmacological measures as appropriate and evaluate response  - Consider cultural and social influences on pain and pain management  - Notify physician/advanced practitioner if interventions unsuccessful or patient reports new pain  Outcome: Completed     Problem: INFECTION - ADULT  Goal: Absence or prevention of progression during hospitalization  Description: INTERVENTIONS:  - Assess and monitor for signs and symptoms of infection  - Monitor lab/diagnostic results  - Monitor all insertion sites, i e  indwelling lines, tubes, and drains  - Monitor endotracheal if appropriate and nasal secretions for changes in amount and color  - [place] appropriate cooling/warming therapies per order  - Administer medications as ordered  - Instruct and encourage patient and family to use good hand hygiene technique  - Identify and instruct in appropriate isolation precautions for identified infection/condition  Outcome: Completed  Goal: Absence of fever/infection during neutropenic period  Description: INTERVENTIONS:  - Monitor WBC    Outcome: Completed     Problem: SAFETY ADULT  Goal: Patient will remain free of falls  Description: INTERVENTIONS:  - Educate patient/family on patient safety including physical limitations  - Instruct patient to call for assistance with activity   - Consult OT/PT to assist with strengthening/mobility   - Keep Call bell within reach  - Keep bed low and locked with side rails adjusted as appropriate  - Keep care items and personal belongings within reach  - Initiate and maintain comfort rounds  - Consider moving patient to room near nurses station  Outcome: Completed  Goal: Maintain or return to baseline ADL function  Description: INTERVENTIONS:  - Educate patient/family on patient safety including physical limitations  - Instruct patient to call for assistance with activity   - Consult OT/PT to assist with strengthening/mobility   - Keep Call bell within reach  - Keep bed low and locked with side rails adjusted as appropriate  - Keep care items and personal belongings within reach  - Initiate and maintain comfort rounds  - Apply yellow socks and bracelet for high fall risk patients  - Consider moving patient to room near nurses station  Outcome: Completed  Goal: Maintains/Returns to pre admission functional level  Description: INTERVENTIONS:  -  Assess patient's ability to carry out ADLs; assess patient's baseline for ADL function and identify physical deficits which impact ability to perform ADLs (bathing, care of mouth/teeth, toileting, grooming, dressing, etc )  - Assess/evaluate cause of self-care deficits   - Assess range of motion  - Assess patient's mobility; develop plan if impaired  - Assess patient's need for assistive devices and provide as appropriate  - Encourage maximum independence but intervene and supervise when necessary  - Involve family in performance of ADLs  - Assess for home care needs following discharge   - Consider OT consult to assist with ADL evaluation and planning for discharge  - Provide patient education as appropriate  Outcome: Completed     Problem: DISCHARGE PLANNING  Goal: Discharge to home or other facility with appropriate resources  Description: INTERVENTIONS:  - Identify barriers to discharge w/patient and caregiver  - Arrange for needed discharge resources and transportation as appropriate  - Identify discharge learning needs (meds, wound care, etc )  - Arrange for interpretive services to assist at discharge as needed  - Refer to Case Management Department for coordinating discharge planning if the patient needs post-hospital services based on physician/advanced practitioner order or complex needs related to functional status, cognitive ability, or social support system  Outcome: Completed     Problem: Knowledge Deficit  Goal: Patient/family/caregiver demonstrates understanding of disease process, treatment plan, medications, and discharge instructions  Description: Complete learning assessment and assess knowledge base    Interventions:  - Provide teaching at level of understanding  - Provide teaching via preferred learning methods  Outcome: Completed     Problem: METABOLIC, FLUID AND ELECTROLYTES - ADULT  Goal: Electrolytes maintained within normal limits  Description: INTERVENTIONS:  - Monitor labs and assess patient for signs and symptoms of electrolyte imbalances  - Administer electrolyte replacement as ordered  - Monitor response to electrolyte replacements, including repeat lab results as appropriate  - Instruct patient on fluid and nutrition as appropriate  Outcome: Completed  Goal: Fluid balance maintained  Description: INTERVENTIONS:  - Monitor labs   - Monitor I/O and WT  - Instruct patient on fluid and nutrition as appropriate  - Assess for signs & symptoms of volume excess or deficit  Outcome: Completed  Goal: Glucose maintained within target range  Description: INTERVENTIONS:  - Monitor Blood Glucose as ordered  - Assess for signs and symptoms of hyperglycemia and hypoglycemia  - Administer ordered medications to maintain glucose within target range  - Assess nutritional intake and initiate nutrition service referral as needed  Outcome: Completed     Problem: HEMATOLOGIC - ADULT  Goal: Maintains hematologic stability  Description: INTERVENTIONS  - Assess for signs and symptoms of bleeding or hemorrhage  - Monitor labs  - Administer supportive blood products/factors as ordered and appropriate  Outcome: Completed     Problem: MUSCULOSKELETAL - ADULT  Goal: Maintain or return mobility to safest level of function  Description: INTERVENTIONS:  - Assess patient's ability to carry out ADLs; assess patient's baseline for ADL function and identify physical deficits which impact ability to perform ADLs (bathing, care of mouth/teeth, toileting, grooming, dressing, etc )  - Assess/evaluate cause of self-care deficits   - Assess range of motion  - Assess patient's mobility  - Assess patient's need for assistive devices and provide as appropriate  - Encourage maximum independence but intervene and supervise when necessary  - Involve family in performance of ADLs  - Assess for home care needs following discharge   - Consider OT consult to assist with ADL evaluation and planning for discharge  - Provide patient education as appropriate  Outcome: Completed  Goal: Maintain proper alignment of affected body part  Description: INTERVENTIONS:  - Support, maintain and protect limb and body alignment  - Provide patient/ family with appropriate education  Outcome: Completed     Problem: Prexisting or High Potential for Compromised Skin Integrity  Goal: Skin integrity is maintained or improved  Description: INTERVENTIONS:  - Identify patients at risk for skin breakdown  - Assess and monitor skin integrity  - Assess and monitor nutrition and hydration status  - Monitor labs   - Assess for incontinence   - Turn and reposition patient  - Assist with mobility/ambulation  - Relieve pressure over bony prominences  - Avoid friction and shearing  - Provide appropriate hygiene as needed including keeping skin clean and dry  - Evaluate need for skin moisturizer/barrier cream  - Collaborate with interdisciplinary team   - Patient/family teaching  - Consider wound care consult   Outcome: Completed

## 2022-02-11 NOTE — PROGRESS NOTES
[address]  Progress Note - [place] 2/6/1932, 80 y o  female MRN: [id number]  Unit/Bed#: -01 Encounter: [id number]  Primary Care Provider: Shukri Madrigal MD   Date and time admitted to hospital: 2/7/2022 12:46 PM    * Ambulatory dysfunction  Assessment & Plan  · Falling more frequently at home (in the last 3 months), multiple calls to EMS  · Likely secondary to bad arthritis of bilateral knees  · PT and OT consulted for evaluation  · Consult to case management also placed for possible long-term placement - recommend rehab  · xrays negative for any fractures    Acute cystitis with hematuria  Assessment & Plan  · UA positive for uti  · Patient finished three days of ceftriaxone for UTI  · Procalcitonin negative x 2  · Abx discontinued as procalcitonin was negative x 2 and improvement in WBC  · UCX appears to be contaminated    Iron deficiency anemia  Assessment & Plan  · Baseline hgb is 7 2 (lowest it's been); last check on Jan was at 7 5  · Iron panel shows anemia of iron deficiency  · No history of GI bleed however patient has refused endoscopies in the past  · Hg noted to be low 2/9 at 6 5, no overt signs of bleeding  · Ordered 1 unit prbc improving hg to 7 9   · Performed JOSIE 2/10 and sent stool sample to assess for blood which was negative, note to have hemorrhoids  · Also ordered xray obstruction series as patient has not had bowel movement which was negative for obstruction  · Hg today 7 6  · Gave IV venofer 300 x 2  · Will give one more unit PRBC today as hg is at 7 6 with symptoms I e  fatigue  · On discharge will decrease PO iron to three times weekly instead of daily to avoid constipation  · Will refer patient to hematology for evaluation  · Extensive discussions held with patient and her son regarding the possibility of patient having colon cancer as the cause of her anemia over last few months    Patient's son and daughter at this point do not wish to pursue GI evaluation with colonoscopy given patient's age  Hypercalcemia  Assessment & Plan  · Patient presents with Ca of 12 1, unclear etiology, asymptomatic  · PTH decreased at 6 5 which is appropriately decreased ,  · PTH-rp pendign,   · V25(OH)D slightly low at 24,   · urinary calcium level normal at 14 8, ruling out familial hypocalciuric hypercalcemia  · Improved with IVF  · Suspect elevated calcium is 2/2 dehydration vs hypercalcemia of imobilization but PTH-rp is still pending and so is vitamin D 1,25 OH which need to be follow up    [address]  · Patient on Lorazepam 0 5 mg prn anxiety    Seizure disorder (HCC)  Assessment & Plan  · Continue Keppra 500 mg BID - recently decreased to once daily  · Last seizure was years ago    Ataxia  Assessment & Plan  · From taking Dilantin for 30 years - discontinued 5 years ago and patient was switched to Chelly Farrell which has seemed to result in less issues with ataxia  · Followed by a Neurologist    Spinal stenosis  Assessment & Plan  · H/o spinal stenosis  · Stable at this time  · Continue outpatient f/u    Arthritis  Assessment & Plan  · Continue PTA Tramadol 50 mg q6h prn pain    Essential hypertension  Assessment & Plan  · Continue on PTA Lisinopril 20 mg daily    Hyponatremia-resolved as of 2/11/2022  Assessment & Plan  · Presents with sodium of 132, likely secondary to poor po intake  · Improving with increased po intake at 136          VTE Pharmacologic Prophylaxis:   Moderate Risk (Score 3-4) - Pharmacological DVT Prophylaxis Contraindicated  Sequential Compression Devices Ordered  Patient Centered Rounds: I performed bedside rounds with nursing staff today  Discussions with Specialists or Other Care Team Provider: case management    Education and Discussions with Family / Patient: Updated  (son) at bedside  Time Spent for Care: 30 minutes   More than 50% of total time spent on counseling and coordination of care as described above     Current Length of Stay: 4 day(s)  Current Patient Status: Inpatient   Certification Statement: The patient will continue to require additional inpatient hospital stay due to anemia  Discharge Plan: Anticipate discharge tomorrow to rehab facility  Code Status: Level 3 - DNAR and DNI    Subjective:   Patient seen examined at bedside  Currently has no new complaints  Patient states that she has been having bowel movements  Denies any fevers chills abdominal pain, sob, or chest pain    Objective:     Vitals:   Temp (24hrs), Av °F (37 2 °C), Min:98 3 °F (36 8 °C), Max:99 6 °F (37 6 °C)    Temp:  [98 3 °F (36 8 °C)-99 6 °F (37 6 °C)] 98 3 °F (36 8 °C)  HR:  [86-97] 86  Resp:  [17-18] 18  BP: (149-157)/(63-88) 149/63  SpO2:  [93 %-95 %] 93 %  Body mass index is 29 74 kg/m²  Input and Output Summary (last 24 hours): Intake/Output Summary (Last 24 hours) at 2022 1522  Last data filed at 2022 1229  Gross per 24 hour   Intake 980 ml   Output 200 ml   Net 780 ml       Physical Exam:   Physical Exam  Vitals reviewed  HENT:      Head: Normocephalic and atraumatic  Right Ear: External ear normal       Left Ear: External ear normal       Nose: Nose normal       Mouth/Throat:      Mouth: Mucous membranes are moist       Pharynx: Oropharynx is clear  Eyes:      Extraocular Movements: Extraocular movements intact  Cardiovascular:      Rate and Rhythm: Normal rate and regular rhythm  Pulses: Normal pulses  Heart sounds: Normal heart sounds  Pulmonary:      Effort: Pulmonary effort is normal       Breath sounds: Normal breath sounds  Abdominal:      General: Abdomen is flat  Palpations: Abdomen is soft  Tenderness: There is no abdominal tenderness  Musculoskeletal:         General: Normal range of motion  Cervical back: Normal range of motion  Skin:     General: Skin is warm and dry  Neurological:      General: No focal deficit present        Mental Status: She is alert  Psychiatric:         Mood and Affect: Mood normal           Additional Data:     Labs:  Results from last 7 days   Lab Units 02/11/22  0507 02/09/22  0619 02/08/22  0446   WBC Thousand/uL 9 27   < > 11 69*   HEMOGLOBIN g/dL 7 6*   < > 7 1*   HEMATOCRIT % 26 3*   < > 25 2*   PLATELETS Thousands/uL 465*   < > 586*   NEUTROS PCT %  --   --  60   LYMPHS PCT %  --   --  28   MONOS PCT %  --   --  10   EOS PCT %  --   --  1    < > = values in this interval not displayed  Results from last 7 days   Lab Units 02/11/22  0501 02/09/22  0619 02/08/22  0446   SODIUM mmol/L 136   < > 132*   POTASSIUM mmol/L 3 7   < > 4 1   CHLORIDE mmol/L 102   < > 100   CO2 mmol/L 25   < > 25   BUN mg/dL 13   < > 14   CREATININE mg/dL 0 97   < > 1 03   ANION GAP mmol/L 9   < > 7   CALCIUM mg/dL 9 7   < > 9 8   ALBUMIN g/dL 1 8*   < > 2 2*   TOTAL BILIRUBIN mg/dL  --   --  0 24   ALK PHOS U/L  --   --  97   ALT U/L  --   --  18   AST U/L  --   --  19   GLUCOSE RANDOM mg/dL 114   < > 122    < > = values in this interval not displayed  Results from last 7 days   Lab Units 02/07/22  1320   INR  1 13             Results from last 7 days   Lab Units 02/09/22  0619 02/08/22  0446 02/07/22  1538   LACTIC ACID mmol/L  --   --  1 1   PROCALCITONIN ng/ml 0 13 0 13  --        Lines/Drains:  Invasive Devices  Report    Peripheral Intravenous Line            Peripheral IV 02/10/22 Left Hand <1 day    Peripheral IV 02/11/22 Dorsal (posterior); Right Forearm <1 day                      Imaging: Reviewed radiology reports from this admission including: xray(s)    Recent Cultures (last 7 days):   Results from last 7 days   Lab Units 02/07/22  1537 02/07/22  1320   BLOOD CULTURE  No Growth at 72 hrs    No Growth at 72 hrs   --    URINE CULTURE   --  >100,000 cfu/ml        Last 24 Hours Medication List:   Current Facility-Administered Medications   Medication Dose Route Frequency Provider Last Rate    acetaminophen  650 mg Oral Q6H PRN Natasha De Leon MD      levETIRAcetam  500 mg Oral Daily Natasha De Leon MD      lisinopril  20 mg Oral Daily Natasha De Leon MD      LORazepam  0 5 mg Oral Q8H PRN Natasha De Leon MD      pantoprazole  40 mg Oral Early Morning Natasha De Leon MD      polyethylene glycol  17 g Oral Once Nicholas Juan DO      senna-docusate sodium  2 tablet Oral HS Nicholas Juan DO      sodium chloride (PF)  3 mL Intravenous Q1H PRN Bobby Long MD      traMADol  50 mg Oral Q6H PRN Natasha De Leon MD          Today, Patient Was Seen By: Nile Acosta DO    **Please Note: This note may have been constructed using a voice recognition system  **

## 2022-02-11 NOTE — PROGRESS NOTES
[address]  Progress Note - [facility] 2/6/1932, 80 y o  female MRN: [id number]  Unit/Bed#: -01 Encounter: [id number]  Primary Care Provider: Chloe Orellana MD   Date and time admitted to hospital: 2/7/2022 12:46 PM    * Ambulatory dysfunction  Assessment & Plan  · Falling more frequently at home (in the last 3 months), multiple calls to EMS  · Likely secondary to bad arthritis of bilateral knees  · PT and OT consulted for evaluation  · Consult to case management also placed for possible long-term placement - recommend rehab  · xrays negative for any fractures    Acute cystitis with hematuria  Assessment & Plan  · UA positive for uti  · Patient finished three days of ceftriaxone for UTI  · Procalcitonin negative x 2  · Abx discontinued as procalcitonin was negative x 2 and improvement in WBC  · UCX appears to be contaminated    Iron deficiency anemia  Assessment & Plan  · Baseline hgb is 7 2 (lowest it's been); last check on Jan was at 7 5  · Iron panel shows anemia of iron deficiency  · No history of GI bleed however patient has refused endoscopies in the past  · Hg noted to be low 2/9 at 6 5, no overt signs of bleeding  · Ordered 1 unit prbc improving hg to 7 9   · Performed JOSIE 2/10 and sent stool sample to assess for blood which was negative, note to have hemorrhoids  · Also ordered xray obstruction series as patient has not had bowel movement which was negative for obstruction  · Hg today 7 6  · Gave IV venofer 300 x 2  · On discharge will decrease PO iron to three times weekly instead of daily to avoid constipation  · Will give one more unit prior to discharge and have patient follow up with hematology as outpatient      Hypercalcemia  Assessment & Plan  · Patient presents with Ca of 12 1, unclear etiology, asymptomatic  · PTH decreased at 6 5 which is appropriately decreased ,  · PTH-rp pendign,   · V25(OH)D slightly low at 24,   · urinary calcium level normal at 14 8, ruling out familial hypocalciuric hypercalcemia  · Improved with IVF  · Suspect elevated calcium is 2/2 dehydration vs hypercalcemia of imobilization but PTH-rp is still pending and so is vitamin D 1,25 OH which need to be follow up    [address]  · Patient on Lorazepam 0 5 mg prn anxiety    Seizure disorder (HCC)  Assessment & Plan  · Continue Keppra 500 mg BID - recently decreased to once daily  · Last seizure was years ago    Ataxia  Assessment & Plan  · From taking Dilantin for 30 years - discontinued 5 years ago and patient was switched to [employer] which has seemed to result in less issues with ataxia  · Followed by a Neurologist    Spinal stenosis  Assessment & Plan  · H/o spinal stenosis  · Stable at this time  · Continue outpatient f/u    Arthritis  Assessment & Plan  · Continue PTA Tramadol 50 mg q6h prn pain    Essential hypertension  Assessment & Plan  · Continue on PTA Lisinopril 20 mg daily    Hyponatremia-resolved as of 2/11/2022  Assessment & Plan  · Presents with sodium of 132, likely secondary to poor po intake  · Improving with increased po intake at 136      Medical Problems             Resolved Problems  Date Reviewed: 2/11/2022          Resolved    Hyponatremia 2/11/2022     Resolved by  Pablito Hoffman DO              Discharging Physician / Practitioner: Pablito Hoffman DO  PCP: Shukri Madrigal MD  Admission Date:   Admission Orders (From admission, onward)     Ordered        02/07/22 1620  Inpatient Admission  Once                      Discharge Date: 02/11/22    Consultations During Hospital Stay:  · none    Procedures Performed:   · none    Significant Findings / Test Results:   · UTI  · Anemia  · Asymptomatic hypercalcemia    Incidental Findings:   · See above     Test Results Pending at Discharge (will require follow up):   · F/U PTH - rp, Vitamin d1,24  · F/U Vitamin B12     Outpatient Tests Requested:  · Repeat CBC 1 week     Complications:  none    Reason for Admission: UTI and ambulatory dysfunction  Hospital Course:   Ne Hendrix is a 80 y o  female patient who originally presented to the hospital on 2/7/2022 due to recurrent falls  On arrival patient was noted to have a UTI which she was treated with ceftriaxone for 3 days  Procalcitonin was negative x2 and antibiotics were discontinued urine culture appears to be contaminated  Patient was also evaluated by Physical therapy and Occupational therapy for recurrent falls  They recommend discharge to rehab facility  During her hospital stay patient was noted to be anemic with hemoglobin of 7 8  It appears that patient states hemoglobin has been in the seven range since November 2021  On 02/09 hemoglobin noted to be 6 5 and patient received 1 unit PRBC improving hemoglobin 7 9  Obtained FOBT which was negative for blood  Patient received Venofer 300 x 2  Hemoglobin today is at 7 6 will give 1 more unit prior to discharge and have the patient follow-up with Hematology regarding her anemia  Her oral RN has been changed to 3 times weekly as it can cause constipation  Patient also noted to have hypercalcemia which was asymptomatic on admission  PTH was appropriately decreased  PTH-rp and Vitamin d1,25 are still pending  Will discontinue vitamin D on discharge this could increase her calcium  Please see above list of diagnoses and related plan for additional information  Condition at Discharge: good    Discharge Day Visit / Exam:   Subjective:  Patient seen examined at bedside  Patient currently has no complaints  Patient states that she feels well  Denies having abdominal pain nausea or vomiting    Vitals: Blood Pressure: 149/63 (02/11/22 0722)  Pulse: 86 (02/11/22 0722)  Temperature: 98 3 °F (36 8 °C) (02/11/22 0722)  Temp Source: Oral (02/09/22 1442)  Respirations: 18 (02/11/22 0722)  Height: 5' 1" (154 9 cm) (02/08/22 3996)  Weight - Scale: 71 4 kg (157 lb 6 4 oz) (02/11/22 0549)  SpO2: 93 % (02/11/22 5341)  Exam:   Physical Exam  Vitals reviewed  HENT:      Head: Normocephalic and atraumatic  Right Ear: External ear normal       Left Ear: External ear normal       Nose: Nose normal       Mouth/Throat:      Mouth: Mucous membranes are moist       Pharynx: Oropharynx is clear  Eyes:      Extraocular Movements: Extraocular movements intact  Cardiovascular:      Rate and Rhythm: Normal rate and regular rhythm  Pulses: Normal pulses  Heart sounds: Normal heart sounds  Pulmonary:      Effort: Pulmonary effort is normal       Breath sounds: Normal breath sounds  Abdominal:      General: Abdomen is flat  There is no distension  Palpations: Abdomen is soft  Tenderness: There is no abdominal tenderness  Musculoskeletal:      Cervical back: Normal range of motion  Right lower leg: No edema  Left lower leg: No edema  Skin:     General: Skin is warm and dry  Neurological:      General: No focal deficit present  Mental Status: She is alert and oriented to person, place, and time  Psychiatric:         Mood and Affect: Mood normal          Behavior: Behavior normal           Discussion with Family: Updated  (son) via phone  Discharge instructions/Information to patient and family:   See after visit summary for information provided to patient and family  Provisions for Follow-Up Care:  See after visit summary for information related to follow-up care and any pertinent home health orders  Disposition:   Acute Rehab at [facility] Readmission: no     Discharge Statement:  I spent 45 minutes discharging the patient  This time was spent on the day of discharge  I had direct contact with the patient on the day of discharge  Greater than 50% of the total time was spent examining patient, answering all patient questions, arranging and discussing plan of care with patient as well as directly providing post-discharge instructions  Additional time then spent on discharge activities  Discharge Medications:  See after visit summary for reconciled discharge medications provided to patient and/or family        **Please Note: This note may have been constructed using a voice recognition system**

## 2022-02-11 NOTE — ASSESSMENT & PLAN NOTE
· Baseline hgb is 7 2 (lowest it's been); last check on Jan was at 7 5  · Iron panel shows anemia of iron deficiency  · No history of GI bleed however patient has refused endoscopies in the past  · Hg noted to be low 2/9 at 6 5, no overt signs of bleeding  · Ordered 1 unit prbc improving hg to 7 9   · Performed JOSIE 2/10 and sent stool sample to assess for blood which was negative, note to have hemorrhoids  · Also ordered xray obstruction series as patient has not had bowel movement which was negative for obstruction  · Hg today 7 6  · Gave IV venofer 300 x 2  · Will give one more unit PRBC today as hg is at 7 6 with symptoms I e  fatigue  · On discharge will decrease PO iron to three times weekly instead of daily to avoid constipation  · Will refer patient to hematology for evaluation  · Extensive discussions held with patient and her son regarding the possibility of patient having colon cancer as the cause of her anemia over last few months  Patient's son and daughter at this point do not wish to pursue GI evaluation with colonoscopy given patient's age

## 2022-02-11 NOTE — ASSESSMENT & PLAN NOTE
· From taking Dilantin for 30 years - discontinued 5 years ago and patient was switched to [employer] which has seemed to result in less issues with ataxia  · Followed by a Neurologist

## 2022-02-11 NOTE — ASSESSMENT & PLAN NOTE
· From taking Dilantin for 30 years - discontinued 5 years ago and patient was switched to Margarita Lacks which has seemed to result in less issues with ataxia  · Followed by a Neurologist

## 2022-02-11 NOTE — DISCHARGE INSTRUCTIONS
Dysuria   AMBULATORY CARE:   Dysuria  is trouble urinating, or pain, burning, or discomfort when you urinate  Dysuria is usually a symptom of another problem, such as a blockage or urinary tract infection  Common symptoms include the following:   · Fever     · Cloudy, bad smelling urine     · Urge to urinate often but urinating little     · Back, side, or abdominal pain     · Blood in your urine     · Discharge that smells bad     · Itching    Seek care immediately if:   · You have severe back, side, or abdominal pain  · You have fever and shaking chills  · You vomit several times in a row  Contact your healthcare provider if:   · Your symptoms do not go away, even after treatment  · You have questions or concerns about your condition or care  Treatment for dysuria  may include medicines to treat a bacterial infection or help decrease bladder spasms  Manage your dysuria:   · Drink more liquids  Liquids help flush out bacteria that may be causing an infection  Ask your healthcare provider how much liquid to drink each day and which liquids are best for you  · Take sitz baths as directed  Fill a bathtub with 4 to 6 inches of warm water  You may also use a sitz bath pan that fits over a toilet  Sit in the sitz bath for 20 minutes  Do this 2 to 3 times a day, or as directed  The warm water can help decrease pain and swelling  Follow up with your doctor as directed:  Write down your questions so you remember to ask them during your visits  © Copyright [employer] 2021 Information is for End User's use only and may not be sold, redistributed or otherwise used for commercial purposes  All illustrations and images included in CareNotes® are the copyrighted property of A D A M , Inc  or [facility] Octavio Mascorro   The above information is an  only  It is not intended as medical advice for individual conditions or treatments   Talk to your doctor, nurse or pharmacist before following any medical regimen to see if it is safe and effective for you

## 2022-02-11 NOTE — ASSESSMENT & PLAN NOTE
· UA positive for uti  · Patient finished three days of ceftriaxone for UTI  · Procalcitonin negative x 2  · Abx discontinued as procalcitonin was negative x 2 and improvement in WBC  · UCX appears to be contaminated

## 2022-02-11 NOTE — ASSESSMENT & PLAN NOTE
· Patient presents with Ca of 12 1, unclear etiology, asymptomatic  · PTH decreased at 6 5 which is appropriately decreased ,  · PTH-rp pendign,   · V25(OH)D slightly low at 24,   · urinary calcium level normal at 14 8, ruling out familial hypocalciuric hypercalcemia  · Improved with IVF  · Suspect elevated calcium is 2/2 dehydration vs hypercalcemia of imobilization but PTH-rp is still pending and so is vitamin D 1,25 OH which need to be follow up

## 2022-02-11 NOTE — CASE MANAGEMENT
Case Management Discharge Planning Note    Patient name Pratibha Min  Location /-43 MRN [id number]  : 1932 Date 2022       Current Admission Date: 2022  Current Admission Diagnosis:Ambulatory dysfunction   Patient Active Problem List    Diagnosis Date Noted    Spinal stenosis 2022    Ataxia 2022    Seizure disorder ([address] ) 2022    Iron deficiency anemia 2022    Anxiety 2022    Hypercalcemia 2022    Hyponatremia 2022    Essential hypertension     Arthritis     Ambulatory dysfunction     Acute cystitis with hematuria       LOS (days): 4  Geometric Mean LOS (GMLOS) (days): 3 00  Days to GMLOS:-0 7     OBJECTIVE:  Risk of Unplanned Readmission Score: 12         Current admission status: Inpatient   Preferred Pharmacy:   [address] [place] [address] Kobe Samano [address] [place] [id number]  Phone: [phone] Fax: [phone]    Primary Care Provider: Vikas Davis MD    [facility]  Secondary Insurance:     DISCHARGE DETAILS:    Patient discussed in am huddle stable for discharge  Discussed with patient that transportation via [facility] is not covered by insurance and patient will be billed for this service  Verbalized understanding  CM updated Grant Bob via [address] on [facility] today, stated bed is available today  CM placed referral to [facility] for W/C transport approx  1500  Cm spoke with [facility] admissions patient spouse is at there facility in dementia unit, Son aware patient and spouse will not be in same room due to wife was very upset over other residents behaviors in the dementia unit  Able to accept today pending transport time approx 1500  Cm cancelled slets  transport for patient today anticipated dc tomorrow in [place]   Discharge cancelled for today patient getting 1 blood anticipate dc tomorrow  22      CM called [facility] spoke with admissions and aware dc cancelled for today , anticipated dc tomorrow will keep them up dated  Shamika Mcfadden asked of dc'ing tomorrow if am if possible  Patient aware discharge cancelled for today

## 2022-02-11 NOTE — ASSESSMENT & PLAN NOTE
· Presents with sodium of 132, likely secondary to poor po intake  · Improving with increased po intake at 136

## 2022-02-11 NOTE — PLAN OF CARE
Problem: Potential for Falls  Goal: Patient will remain free of falls  Description: INTERVENTIONS:  - Educate patient/family on patient safety including physical limitations  - Instruct patient to call for assistance with activity   - Consult OT/PT to assist with strengthening/mobility   - Keep Call bell within reach  - Keep bed low and locked with side rails adjusted as appropriate  - Keep care items and personal belongings within reach  - Initiate and maintain comfort rounds  - Make Fall Risk Sign visible to staff  - Offer Toileting every 2 Hours, in advance of need  - Initiate/Maintain bed alarm  - Apply yellow socks and bracelet for high fall risk patients  - Consider moving patient to room near nurses station  Outcome: Progressing     Problem: Nutrition/Hydration-ADULT  Goal: Nutrient/Hydration intake appropriate for improving, restoring or maintaining nutritional needs  Description: Monitor and assess patient's nutrition/hydration status for malnutrition  Collaborate with interdisciplinary team and initiate plan and interventions as ordered  Monitor patient's weight and dietary intake as ordered or per policy  Utilize nutrition screening tool and intervene as necessary  Determine patient's food preferences and provide high-protein, high-caloric foods as appropriate       INTERVENTIONS:  - Monitor oral intake, urinary output, labs, and treatment plans  - Assess nutrition and hydration status and recommend course of action  - Evaluate amount of meals eaten  - Assist patient with eating if necessary   - Allow adequate time for meals  - Recommend/ encourage appropriate diets, oral nutritional supplements, and vitamin/mineral supplements  - Order, calculate, and assess calorie counts as needed  - Recommend, monitor, and adjust tube feedings and TPN/PPN based on assessed needs  - Assess need for intravenous fluids  - Provide specific nutrition/hydration education as appropriate  - Include patient/family/caregiver in decisions related to nutrition  Outcome: Progressing     Problem: MOBILITY - ADULT  Goal: Maintain or return to baseline ADL function  Description: INTERVENTIONS:  - Educate patient/family on patient safety including physical limitations  - Instruct patient to call for assistance with activity   - Consult OT/PT to assist with strengthening/mobility   - Keep Call bell within reach  - Keep bed low and locked with side rails adjusted as appropriate  - Keep care items and personal belongings within reach  - Initiate and maintain comfort rounds  - Make Fall Risk Sign visible to staff  - Apply yellow socks and bracelet for high fall risk patients  - Consider moving patient to room near nurses station  Outcome: Progressing  Goal: Maintains/Returns to pre admission functional level  Description: INTERVENTIONS:  -  Assess patient's ability to carry out ADLs; assess patient's baseline for ADL function and identify physical deficits which impact ability to perform ADLs (bathing, care of mouth/teeth, toileting, grooming, dressing, etc )  - Assess/evaluate cause of self-care deficits   - Assess range of motion  - Assess patient's mobility; develop plan if impaired  - Assess patient's need for assistive devices and provide as appropriate  - Encourage maximum independence but intervene and supervise when necessary  - Involve family in performance of ADLs  - Assess for home care needs following discharge   - Consider OT consult to assist with ADL evaluation and planning for discharge  - Provide patient education as appropriate  Outcome: Progressing     Problem: PAIN - ADULT  Goal: Verbalizes/displays adequate comfort level or baseline comfort level  Description: Interventions:  - Encourage patient to monitor pain and request assistance  - Assess pain using appropriate pain scale  - Administer analgesics based on type and severity of pain and evaluate response  - Implement non-pharmacological measures as appropriate and evaluate response  - Consider cultural and social influences on pain and pain management  - Notify physician/advanced practitioner if interventions unsuccessful or patient reports new pain  Outcome: Progressing     Problem: INFECTION - ADULT  Goal: Absence or prevention of progression during hospitalization  Description: INTERVENTIONS:  - Assess and monitor for signs and symptoms of infection  - Monitor lab/diagnostic results  - Monitor all insertion sites, i e  indwelling lines, tubes, and drains  - Monitor endotracheal if appropriate and nasal secretions for changes in amount and color  - [place] appropriate cooling/warming therapies per order  - Administer medications as ordered  - Instruct and encourage patient and family to use good hand hygiene technique  - Identify and instruct in appropriate isolation precautions for identified infection/condition  Outcome: Progressing  Goal: Absence of fever/infection during neutropenic period  Description: INTERVENTIONS:  - Monitor WBC    Outcome: Progressing     Problem: SAFETY ADULT  Goal: Patient will remain free of falls  Description: INTERVENTIONS:  - Educate patient/family on patient safety including physical limitations  - Instruct patient to call for assistance with activity   - Consult OT/PT to assist with strengthening/mobility   - Keep Call bell within reach  - Keep bed low and locked with side rails adjusted as appropriate  - Keep care items and personal belongings within reach  - Initiate and maintain comfort rounds  - Make Fall Risk Sign visible to staff  - Apply yellow socks and bracelet for high fall risk patients  - Consider moving patient to room near nurses station  Outcome: Progressing  Goal: Maintain or return to baseline ADL function  Description: INTERVENTIONS:  - Educate patient/family on patient safety including physical limitations  - Instruct patient to call for assistance with activity   - Consult OT/PT to assist with strengthening/mobility   - Keep Call bell within reach  - Keep bed low and locked with side rails adjusted as appropriate  - Keep care items and personal belongings within reach  - Initiate and maintain comfort rounds  - Make Fall Risk Sign visible to staff  - Apply yellow socks and bracelet for high fall risk patients  - Consider moving patient to room near nurses station  Outcome: Progressing  Goal: Maintains/Returns to pre admission functional level  Description: INTERVENTIONS:  -  Assess patient's ability to carry out ADLs; assess patient's baseline for ADL function and identify physical deficits which impact ability to perform ADLs (bathing, care of mouth/teeth, toileting, grooming, dressing, etc )  - Assess/evaluate cause of self-care deficits   - Assess range of motion  - Assess patient's mobility; develop plan if impaired  - Assess patient's need for assistive devices and provide as appropriate  - Encourage maximum independence but intervene and supervise when necessary  - Involve family in performance of ADLs  - Assess for home care needs following discharge   - Consider OT consult to assist with ADL evaluation and planning for discharge  - Provide patient education as appropriate  Outcome: Progressing     Problem: DISCHARGE PLANNING  Goal: Discharge to home or other facility with appropriate resources  Description: INTERVENTIONS:  - Identify barriers to discharge w/patient and caregiver  - Arrange for needed discharge resources and transportation as appropriate  - Identify discharge learning needs (meds, wound care, etc )  - Arrange for interpretive services to assist at discharge as needed  - Refer to Case Management Department for coordinating discharge planning if the patient needs post-hospital services based on physician/advanced practitioner order or complex needs related to functional status, cognitive ability, or social support system  Outcome: Progressing     Problem: Knowledge Deficit  Goal: Patient/family/caregiver demonstrates understanding of disease process, treatment plan, medications, and discharge instructions  Description: Complete learning assessment and assess knowledge base    Interventions:  - Provide teaching at level of understanding  - Provide teaching via preferred learning methods  Outcome: Progressing     Problem: METABOLIC, FLUID AND ELECTROLYTES - ADULT  Goal: Electrolytes maintained within normal limits  Description: INTERVENTIONS:  - Monitor labs and assess patient for signs and symptoms of electrolyte imbalances  - Administer electrolyte replacement as ordered  - Monitor response to electrolyte replacements, including repeat lab results as appropriate  - Instruct patient on fluid and nutrition as appropriate  Outcome: Progressing  Goal: Fluid balance maintained  Description: INTERVENTIONS:  - Monitor labs   - Monitor I/O and WT  - Instruct patient on fluid and nutrition as appropriate  - Assess for signs & symptoms of volume excess or deficit  Outcome: Progressing  Goal: Glucose maintained within target range  Description: INTERVENTIONS:  - Monitor Blood Glucose as ordered  - Assess for signs and symptoms of hyperglycemia and hypoglycemia  - Administer ordered medications to maintain glucose within target range  - Assess nutritional intake and initiate nutrition service referral as needed  Outcome: Progressing     Problem: HEMATOLOGIC - ADULT  Goal: Maintains hematologic stability  Description: INTERVENTIONS  - Assess for signs and symptoms of bleeding or hemorrhage  - Monitor labs  - Administer supportive blood products/factors as ordered and appropriate  Outcome: Progressing     Problem: MUSCULOSKELETAL - ADULT  Goal: Maintain or return mobility to safest level of function  Description: INTERVENTIONS:  - Assess patient's ability to carry out ADLs; assess patient's baseline for ADL function and identify physical deficits which impact ability to perform ADLs (bathing, care of mouth/teeth, toileting, grooming, dressing, etc )  - Assess/evaluate cause of self-care deficits   - Assess range of motion  - Assess patient's mobility  - Assess patient's need for assistive devices and provide as appropriate  - Encourage maximum independence but intervene and supervise when necessary  - Involve family in performance of ADLs  - Assess for home care needs following discharge   - Consider OT consult to assist with ADL evaluation and planning for discharge  - Provide patient education as appropriate  Outcome: Progressing  Goal: Maintain proper alignment of affected body part  Description: INTERVENTIONS:  - Support, maintain and protect limb and body alignment  - Provide patient/ family with appropriate education  Outcome: Progressing     Problem: Prexisting or High Potential for Compromised Skin Integrity  Goal: Skin integrity is maintained or improved  Description: INTERVENTIONS:  - Identify patients at risk for skin breakdown  - Assess and monitor skin integrity  - Assess and monitor nutrition and hydration status  - Monitor labs   - Assess for incontinence   - Turn and reposition patient  - Assist with mobility/ambulation  - Relieve pressure over bony prominences  - Avoid friction and shearing  - Provide appropriate hygiene as needed including keeping skin clean and dry  - Evaluate need for skin moisturizer/barrier cream  - Collaborate with interdisciplinary team   - Patient/family teaching  - Consider wound care consult   Outcome: Progressing

## 2022-02-11 NOTE — ASSESSMENT & PLAN NOTE
· Baseline hgb is 7 2 (lowest it's been); last check on Jan was at 7 5  · Iron panel shows anemia of iron deficiency  · No history of GI bleed however patient has refused endoscopies in the past  · Hg noted to be low 2/9 at 6 5, no overt signs of bleeding  · Ordered 1 unit prbc improving hg to 7 9   · Performed JOSIE 2/10 and sent stool sample to assess for blood which was negative, note to have hemorrhoids  · Also ordered xray obstruction series as patient has not had bowel movement which was negative for obstruction  · Hg today 7 6  · Gave IV venofer 300 x 2  · On discharge will decrease PO iron to three times weekly instead of daily to avoid constipation  · Will give one more unit prior to discharge and have patient follow up with hematology as outpatient

## 2022-02-11 NOTE — DISCHARGE SUMMARY
[address]  Discharge- [facility] 2/6/1932, 80 y o  female MRN: [id number]  Unit/Bed#: -01 Encounter: [id number]  Primary Care Provider: Leo Richards MD   Date and time admitted to hospital: 2/7/2022 12:46 PM    * Ambulatory dysfunction  Assessment & Plan  · Falling more frequently at home (in the last 3 months), multiple calls to EMS  · Likely secondary to bad arthritis of bilateral knees  · PT and OT consulted for evaluation  · Consult to case management also placed for possible long-term placement - recommend rehab  · xrays negative for any fractures    Acute cystitis with hematuria  Assessment & Plan  · UA positive for uti  · Patient finished three days of ceftriaxone for UTI  · Procalcitonin negative x 2  · Abx discontinued as procalcitonin was negative x 2 and improvement in WBC  · UCX appears to be contaminated    Iron deficiency anemia  Assessment & Plan  · Baseline hgb is 7 2 (lowest it's been); last check on Jan was at 7 5  · Iron panel shows anemia of iron deficiency  · No history of GI bleed however patient has refused endoscopies in the past  · Hg noted to be low 2/9 at 6 5, no overt signs of bleeding  · Ordered 1 unit prbc improving hg to 7 9   · Performed JOSIE 2/10 and sent stool sample to assess for blood which was negative, note to have hemorrhoids  · Also ordered xray obstruction series as patient has not had bowel movement which was negative for obstruction  · Hg today 7 6  · Gave IV venofer 300 x 2  · On discharge will decrease PO iron to three times weekly instead of daily to avoid constipation  · Will give one more unit prior to discharge and have patient follow up with hematology as outpatient      Hypercalcemia  Assessment & Plan  · Patient presents with Ca of 12 1, unclear etiology, asymptomatic  · PTH decreased at 6 5 which is appropriately decreased ,  · PTH-rp pendign,   · V25(OH)D slightly low at 24,   · urinary calcium level normal at 14 8, ruling out familial hypocalciuric hypercalcemia  · Improved with IVF  · Suspect elevated calcium is 2/2 dehydration vs hypercalcemia of imobilization but PTH-rp is still pending and so is vitamin D 1,25 OH which need to be follow up    [address]  · Patient on Lorazepam 0 5 mg prn anxiety    Ataxia  Assessment & Plan  · From taking Dilantin for 30 years - discontinued 5 years ago and patient was switched to Doodle which has seemed to result in less issues with ataxia  · Followed by a Neurologist    Arthritis  Assessment & Plan  · Continue PTA Tramadol 50 mg q6h prn pain    Essential hypertension  Assessment & Plan  · Continue on PTA Lisinopril 20 mg daily    Hyponatremia-resolved as of 2/11/2022  Assessment & Plan  · Presents with sodium of 132, likely secondary to poor po intake  · Improving with increased po intake at 136          Discharging Physician / Practitioner: Nile Acosta DO  PCP: Phuong Price MD  Admission Date:       Admission Orders (From admission, onward)              Ordered          02/07/22 1620   Inpatient Admission  Once                          Discharge Date: 02/11/22     Consultations During Hospital Stay:  · none     Procedures Performed:   · none     Significant Findings / Test Results:   · UTI  · Anemia  · Asymptomatic hypercalcemia     Incidental Findings:   · See above      Test Results Pending at Discharge (will require follow up):   · F/U PTH - rp, Vitamin d1,24  · F/U Vitamin B12     Outpatient Tests Requested:  · Repeat CBC 1 week      Complications:  none     Reason for Admission: UTI and ambulatory dysfunction      Hospital Course:   Marilynn Hebert is a 80 y o  female patient who originally presented to the hospital on 2/7/2022 due to recurrent falls  On arrival patient was noted to have a UTI which she was treated with ceftriaxone for 3 days  Procalcitonin was negative x2 and antibiotics were discontinued urine culture appears to be contaminated    Patient was also evaluated by Physical therapy and Occupational therapy for recurrent falls  They recommend discharge to rehab facility      During her hospital stay patient was noted to be anemic with hemoglobin of 7 8  It appears that patient states hemoglobin has been in the seven range since November 2021  On 02/09 hemoglobin noted to be 6 5 and patient received 1 unit PRBC improving hemoglobin 7 9  Obtained FOBT which was negative for blood  Patient received Venofer 300 x 2  Hemoglobin today is at 7 6 will give 1 more unit prior to discharge and have the patient follow-up with Hematology regarding her anemia  Her oral RN has been changed to 3 times weekly as it can cause constipation      Patient also noted to have hypercalcemia which was asymptomatic on admission  PTH was appropriately decreased  PTH-rp and Vitamin d1,25 are still pending  Will discontinue vitamin D on discharge this could increase her calcium            Please see above list of diagnoses and related plan for additional information       Condition at Discharge: good     Discharge Day Visit / Exam:   Subjective:  Patient seen examined at bedside  Patient currently has no complaints  Patient states that she feels well  Denies having abdominal pain nausea or vomiting  Vitals: Blood Pressure: 149/63 (02/11/22 0722)  Pulse: 86 (02/11/22 0722)  Temperature: 98 3 °F (36 8 °C) (02/11/22 0722)  Temp Source: Oral (02/09/22 1442)  Respirations: 18 (02/11/22 0722)  Height: 5' 1" (154 9 cm) (02/08/22 1379)  Weight - Scale: 71 4 kg (157 lb 6 4 oz) (02/11/22 0549)  SpO2: 93 % (02/11/22 0722)  Exam:   Physical Exam  Vitals reviewed  HENT:      Head: Normocephalic and atraumatic  Right Ear: External ear normal       Left Ear: External ear normal       Nose: Nose normal       Mouth/Throat:      Mouth: Mucous membranes are moist       Pharynx: Oropharynx is clear  Eyes:      Extraocular Movements: Extraocular movements intact     Cardiovascular: Rate and Rhythm: Normal rate and regular rhythm  Pulses: Normal pulses  Heart sounds: Normal heart sounds  Pulmonary:      Effort: Pulmonary effort is normal       Breath sounds: Normal breath sounds  Abdominal:      General: Abdomen is flat  There is no distension  Palpations: Abdomen is soft  Tenderness: There is no abdominal tenderness  Musculoskeletal:      Cervical back: Normal range of motion  Right lower leg: No edema  Left lower leg: No edema  Skin:     General: Skin is warm and dry  Neurological:      General: No focal deficit present  Mental Status: She is alert and oriented to person, place, and time  Psychiatric:         Mood and Affect: Mood normal          Behavior: Behavior normal             Discussion with Family: Updated  (son) via phone      Discharge instructions/Information to patient and family:   See after visit summary for information provided to patient and family        Provisions for Follow-Up Care:  See after visit summary for information related to follow-up care and any pertinent home health orders  Disposition:   Acute Rehab at [facility]      Planned Readmission: no     Discharge Statement:  I spent 45 minutes discharging the patient  This time was spent on the day of discharge  I had direct contact with the patient on the day of discharge  Greater than 50% of the total time was spent examining patient, answering all patient questions, arranging and discussing plan of care with patient as well as directly providing post-discharge instructions    Additional time then spent on discharge activities      Discharge Medications:  See after visit summary for reconciled discharge medications provided to patient and/or family        **Please Note: This note may have been constructed using a voice recognition system**

## 2022-02-12 VITALS
RESPIRATION RATE: 18 BRPM | SYSTOLIC BLOOD PRESSURE: 164 MMHG | TEMPERATURE: 98.1 F | WEIGHT: 157.41 LBS | OXYGEN SATURATION: 96 % | BODY MASS INDEX: 29.72 KG/M2 | HEART RATE: 93 BPM | HEIGHT: 61 IN | DIASTOLIC BLOOD PRESSURE: 81 MMHG

## 2022-02-12 LAB
ABO GROUP BLD BPU: NORMAL
ALBUMIN SERPL BCP-MCNC: 2.2 G/DL (ref 3.5–5)
ANION GAP SERPL CALCULATED.3IONS-SCNC: 7 MMOL/L (ref 4–13)
BPU ID: NORMAL
BUN SERPL-MCNC: 12 MG/DL (ref 5–25)
CALCIUM ALBUM COR SERPL-MCNC: 11.7 MG/DL (ref 8.3–10.1)
CALCIUM SERPL-MCNC: 10.3 MG/DL (ref 8.3–10.1)
CHLORIDE SERPL-SCNC: 102 MMOL/L (ref 100–108)
CO2 SERPL-SCNC: 27 MMOL/L (ref 21–32)
CREAT SERPL-MCNC: 0.97 MG/DL (ref 0.6–1.3)
CROSSMATCH: NORMAL
ERYTHROCYTE [DISTWIDTH] IN BLOOD BY AUTOMATED COUNT: 22.1 % (ref 11.6–15.1)
FLUAV RNA RESP QL NAA+PROBE: NEGATIVE
FLUBV RNA RESP QL NAA+PROBE: NEGATIVE
GFR SERPL CREATININE-BSD FRML MDRD: 51 ML/MIN/1.73SQ M
GLUCOSE SERPL-MCNC: 110 MG/DL (ref 65–140)
HCT VFR BLD AUTO: 32.7 % (ref 34.8–46.1)
HGB BLD-MCNC: 10 G/DL (ref 11.5–15.4)
MAGNESIUM SERPL-MCNC: 2.4 MG/DL (ref 1.6–2.6)
MCH RBC QN AUTO: 22.3 PG (ref 26.8–34.3)
MCHC RBC AUTO-ENTMCNC: 30.6 G/DL (ref 31.4–37.4)
MCV RBC AUTO: 73 FL (ref 82–98)
PHOSPHATE SERPL-MCNC: 2.4 MG/DL (ref 2.3–4.1)
PLATELET # BLD AUTO: 482 THOUSANDS/UL (ref 149–390)
PMV BLD AUTO: 9.3 FL (ref 8.9–12.7)
POTASSIUM SERPL-SCNC: 3.9 MMOL/L (ref 3.5–5.3)
RBC # BLD AUTO: 4.49 MILLION/UL (ref 3.81–5.12)
RSV RNA RESP QL NAA+PROBE: NEGATIVE
SARS-COV-2 RNA RESP QL NAA+PROBE: NEGATIVE
SODIUM SERPL-SCNC: 136 MMOL/L (ref 136–145)
UNIT DISPENSE STATUS: NORMAL
UNIT PRODUCT CODE: NORMAL
UNIT PRODUCT VOLUME: 350 ML
UNIT RH: NORMAL
WBC # BLD AUTO: 10.96 THOUSAND/UL (ref 4.31–10.16)

## 2022-02-12 PROCEDURE — 80069 RENAL FUNCTION PANEL: CPT | Performed by: STUDENT IN AN ORGANIZED HEALTH CARE EDUCATION/TRAINING PROGRAM

## 2022-02-12 PROCEDURE — 0241U HB NFCT DS VIR RESP RNA 4 TRGT: CPT | Performed by: STUDENT IN AN ORGANIZED HEALTH CARE EDUCATION/TRAINING PROGRAM

## 2022-02-12 PROCEDURE — 83918 ORGANIC ACIDS TOTAL QUANT: CPT | Performed by: STUDENT IN AN ORGANIZED HEALTH CARE EDUCATION/TRAINING PROGRAM

## 2022-02-12 PROCEDURE — 83735 ASSAY OF MAGNESIUM: CPT | Performed by: STUDENT IN AN ORGANIZED HEALTH CARE EDUCATION/TRAINING PROGRAM

## 2022-02-12 PROCEDURE — 99239 HOSP IP/OBS DSCHRG MGMT >30: CPT | Performed by: STUDENT IN AN ORGANIZED HEALTH CARE EDUCATION/TRAINING PROGRAM

## 2022-02-12 PROCEDURE — 85027 COMPLETE CBC AUTOMATED: CPT | Performed by: STUDENT IN AN ORGANIZED HEALTH CARE EDUCATION/TRAINING PROGRAM

## 2022-02-12 RX ADMIN — PANTOPRAZOLE SODIUM 40 MG: 40 TABLET, DELAYED RELEASE ORAL at 05:47

## 2022-02-12 RX ADMIN — LEVETIRACETAM 500 MG: 500 TABLET, FILM COATED ORAL at 08:22

## 2022-02-12 RX ADMIN — LISINOPRIL 20 MG: 10 TABLET ORAL at 08:22

## 2022-02-12 NOTE — PROGRESS NOTES
Patient transported to [facility] via wheelchair ambulance [address]  Status unchanged since initial assessment

## 2022-02-12 NOTE — ASSESSMENT & PLAN NOTE
· Falling more frequently at home (in the last 3 months), multiple calls to EMS  · Likely secondary to bad arthritis of bilateral knees  · PT and OT consulted for evaluation  · Consult to case management also placed for possible long-term placement - recommend rehab  · xrays negative for any fractures  · Plan to discharge to [facility]

## 2022-02-12 NOTE — PLAN OF CARE
Problem: PAIN - ADULT  Goal: Verbalizes/displays adequate comfort level or baseline comfort level  Description: Interventions:  - Encourage patient to monitor pain and request assistance  - Assess pain using appropriate pain scale  - Administer analgesics based on type and severity of pain and evaluate response  - Implement non-pharmacological measures as appropriate and evaluate response  - Consider cultural and social influences on pain and pain management  - Notify physician/advanced practitioner if interventions unsuccessful or patient reports new pain  Outcome: Progressing     Problem: INFECTION - ADULT  Goal: Absence or prevention of progression during hospitalization  Description: INTERVENTIONS:  - Assess and monitor for signs and symptoms of infection  - Monitor lab/diagnostic results  - Monitor all insertion sites, i e  indwelling lines, tubes, and drains  - Monitor endotracheal if appropriate and nasal secretions for changes in amount and color  - [place] appropriate cooling/warming therapies per order  - Administer medications as ordered  - Instruct and encourage patient and family to use good hand hygiene technique  - Identify and instruct in appropriate isolation precautions for identified infection/condition  Outcome: Progressing     Problem: SAFETY ADULT  Goal: Patient will remain free of falls  Description: INTERVENTIONS:  - Educate patient/family on patient safety including physical limitations  - Instruct patient to call for assistance with activity   - Consult OT/PT to assist with strengthening/mobility   - Keep Call bell within reach  - Keep bed low and locked with side rails adjusted as appropriate  - Keep care items and personal belongings within reach  - Initiate and maintain comfort rounds  - Make Fall Risk Sign visible to staff  - Offer Toileting every 2 Hours, in advance of need  - Initiate/Maintain bed alarm  - Apply yellow socks and bracelet for high fall risk patients  - Consider moving patient to room near nurses station  Outcome: Progressing  Goal: Maintain or return to baseline ADL function  Description: INTERVENTIONS:  -  Assess patient's ability to carry out ADLs; assess patient's baseline for ADL function and identify physical deficits which impact ability to perform ADLs (bathing, care of mouth/teeth, toileting, grooming, dressing, etc )  - Assess/evaluate cause of self-care deficits   - Assess range of motion  - Assess patient's mobility; develop plan if impaired  - Assess patient's need for assistive devices and provide as appropriate  - Encourage maximum independence but intervene and supervise when necessary  - Involve family in performance of ADLs  - Assess for home care needs following discharge   - Consider OT consult to assist with ADL evaluation and planning for discharge  - Provide patient education as appropriate  Outcome: Progressing  Goal: Maintains/Returns to pre admission functional level  Description: INTERVENTIONS:  - Perform BMAT or MOVE assessment daily    - Set and communicate daily mobility goal to care team and patient/family/caregiver     - Collaborate with rehabilitation services on mobility goals if consulted  - Out of bed for toileting  - Record patient progress and toleration of activity level   Outcome: Progressing     Problem: DISCHARGE PLANNING  Goal: Discharge to home or other facility with appropriate resources  Description: INTERVENTIONS:  - Identify barriers to discharge w/patient and caregiver  - Arrange for needed discharge resources and transportation as appropriate  - Identify discharge learning needs (meds, wound care, etc )  - Arrange for interpretive services to assist at discharge as needed  - Refer to Case Management Department for coordinating discharge planning if the patient needs post-hospital services based on physician/advanced practitioner order or complex needs related to functional status, cognitive ability, or social support system  Outcome: Progressing     Problem: Knowledge Deficit  Goal: Patient/family/caregiver demonstrates understanding of disease process, treatment plan, medications, and discharge instructions  Description: Complete learning assessment and assess knowledge base    Interventions:  - Provide teaching at level of understanding  - Provide teaching via preferred learning methods  Outcome: Progressing

## 2022-02-12 NOTE — NURSING NOTE
Verbal report called to Angelica GOVEA at [facility]  All questions answered  Patient is for [facility] pickup for transport to [facility]

## 2022-02-12 NOTE — ASSESSMENT & PLAN NOTE
· Baseline hgb is 7 2 (lowest it's been); last check on Jan was at 7 5  · Iron panel shows anemia of iron deficiency  · No history of GI bleed however patient has refused endoscopies in the past  · Hg noted to be low 2/9 at 6 5, no overt signs of bleeding  · Ordered 1 unit prbc improving hg to 7 9   · Performed JOSIE 2/10 and sent stool sample to assess for blood which was negative, note to have hemorrhoids  · Also ordered xray obstruction series as patient has not had bowel movement which was negative for obstruction  · Gave IV venofer 300 x 2  · Gave one more unit PRBC 2/11/22 as hg is at 7 6 with symptoms I e  fatigue  · On discharge will decrease PO iron to three times weekly instead of daily to avoid constipation  · Will refer patient to hematology for evaluation  · Extensive discussions held with patient and her son regarding the possibility of patient having colon cancer as the cause of her anemia over last few months  Patient's son and daughter at this point do not wish to pursue GI evaluation with colonoscopy given patient's age

## 2022-02-12 NOTE — CASE MANAGEMENT
Case Management Discharge Planning Note    Patient name Elaina Harris  Location /-67 MRN [id number]  : 1932 Date 2022       Current Admission Date: 2022  Current Admission Diagnosis:Ambulatory dysfunction   Patient Active Problem List    Diagnosis Date Noted    Spinal stenosis 2022    Ataxia 2022    Seizure disorder ([address] ) 2022    Iron deficiency anemia 2022    Anxiety 2022    Hypercalcemia 2022    Essential hypertension     Arthritis     Ambulatory dysfunction     Acute cystitis with hematuria       LOS (days): 5  Geometric Mean LOS (GMLOS) (days): 3 00  Days to GMLOS:-1 7     OBJECTIVE:  Risk of Unplanned Readmission Score: 11         Current admission status: Inpatient   Preferred Pharmacy:   [address] [place] [address] Kobe Marin[address] [address]  Phone: [phone] Fax: [phone]    Primary Care Provider: Alie Christy MD    Primary Insurance: [employer] [facility]  Secondary Insurance:      Patient is medically stable for dc per provider  Needs COVID screen  Spoke to Bradford Naranjo at [facility] and they can accept  -- Auth for [facility] is  [id number] [phone]  Reviewed with Son via phone  DISCHARGE DETAILS:    Discharge planning discussed with[de-identified] Son  Freedom of Choice: Yes  Comments - Freedom of Choice: [facility] is families choice  CM contacted family/caregiver?: Yes  Were Treatment Team discharge recommendations reviewed with patient/caregiver?: Yes          Contacts  Patient Contacts:  Son  Relationship to Patient[de-identified] Family  Contact Method: Phone  Reason/Outcome: Discharge Planning (Informed Serena Hauser of [facility] and OOP expense for [facility] transport is pt/family responsibility)                        Treatment Team Recommendation: Short Term Rehab  Discharge Destination Plan[de-identified] Short Term Rehab  Transport at Discharge : [address] Name, City & State : [facility]  [facility]ency [facility] Number[phone] Its Bradford Naranjo today- thanks  Facility/Agency Fax Number: [phone]       Arranging transport for 1200- await confirmation from [facility]

## 2022-02-12 NOTE — CASE MANAGEMENT
Case Management Discharge Planning Note    Patient name Phuong Alexander  Location /-74 MRN [id number]  : 1932 Date 2022       Current Admission Date: 2022  Current Admission Diagnosis:Ambulatory dysfunction   Patient Active Problem List    Diagnosis Date Noted    Spinal stenosis 2022    Ataxia 2022    Seizure disorder ([address] ) 2022    Iron deficiency anemia 2022    Anxiety 2022    Hypercalcemia 2022    Essential hypertension     Arthritis     Ambulatory dysfunction     Acute cystitis with hematuria       LOS (days): 5  Geometric Mean LOS (GMLOS) (days): 3 00  Days to GMLOS:-1 8     OBJECTIVE:  Risk of Unplanned Readmission Score: 11         Current admission status: Inpatient   Preferred Pharmacy:   [address] [place] [address] Kobe Samano [address] [place] [address]  Phone: [phone] Fax: [phone]    Primary Care Provider: Korey Martinez MD    [facility]  Secondary Insurance:     DISCHARGE DETAILS:  Confirmed  at 1200 with [facility] is aware  Nursing is aware time of transport changed to 1500

## 2022-02-12 NOTE — DISCHARGE SUMMARY
[address]  Discharge- [facility] 2/6/1932, 80 y o  female MRN: [id number]  Unit/Bed#: -01 Encounter: [id number]  Primary Care Provider: Korey Martinez MD   Date and time admitted to hospital: 2/7/2022 12:46 PM    * Ambulatory dysfunction  Assessment & Plan  · Falling more frequently at home (in the last 3 months), multiple calls to EMS  · Likely secondary to bad arthritis of bilateral knees  · PT and OT consulted for evaluation  · Consult to case management also placed for possible long-term placement - recommend rehab  · xrays negative for any fractures  · Plan to discharge to [facility]    Acute cystitis with hematuria  Assessment & Plan  · UA positive for uti  · Patient finished three days of ceftriaxone for UTI  · Procalcitonin negative x 2  · Abx discontinued as procalcitonin was negative x 2 and improvement in WBC  · UCX appears to be contaminated    Iron deficiency anemia  Assessment & Plan  · Baseline hgb is 7 2 (lowest it's been); last check on Jan was at 7 5  · Iron panel shows anemia of iron deficiency  · No history of GI bleed however patient has refused endoscopies in the past  · Hg noted to be low 2/9 at 6 5, no overt signs of bleeding  · Ordered 1 unit prbc improving hg to 7 9   · Performed JOSIE 2/10 and sent stool sample to assess for blood which was negative, note to have hemorrhoids  · Also ordered xray obstruction series as patient has not had bowel movement which was negative for obstruction  · Gave IV venofer 300 x 2  · Gave one more unit PRBC 2/11/22 as hg is at 7 6 with symptoms I e  fatigue  · On discharge will decrease PO iron to three times weekly instead of daily to avoid constipation  · Will refer patient to hematology for evaluation  · Extensive discussions held with patient and her son regarding the possibility of patient having colon cancer as the cause of her anemia over last few months    Patient's son and daughter at this point do not wish to pursue GI evaluation with colonoscopy given patient's age      Hypercalcemia  Assessment & Plan  · Patient presents with Ca of 12 1, unclear etiology, asymptomatic  · PTH decreased at 6 5 which is appropriately decreased ,  · PTH-rp pending,   · V25(OH)D slightly low at 24,   · urinary calcium level normal at 14 8, ruling out familial hypocalciuric hypercalcemia  · PTH-rp is still pending if positive consider CT chest to assess for SCC   · D 1,25 OH 35 within normal limits ruling out granulomatous disease and CXR negative for any findings  · Suspect elevated calcium is 2/2  hypercalcemia of imobilization,   · Currently asymptomatic  · Will hold Vitamin D on discharge as it can increase calcium levels    Anxiety  Assessment & Plan  · Patient on Lorazepam 0 5 mg prn anxiety    Seizure disorder (HCC)  Assessment & Plan  · Continue Keppra 500 mg BID - recently decreased to once daily  · Last seizure was years ago    Ataxia  Assessment & Plan  · From taking Dilantin for 30 years - discontinued 5 years ago and patient was switched to [employer] which has seemed to result in less issues with ataxia  · Followed by a Neurologist    Spinal stenosis  Assessment & Plan  · H/o spinal stenosis  · Stable at this time  · Continue outpatient f/u    Arthritis  Assessment & Plan  · Continue PTA Tramadol 50 mg q6h prn pain    Essential hypertension  Assessment & Plan  · Continue on PTA Lisinopril 20 mg daily    Hyponatremia-resolved as of 2022  Assessment & Plan  · Presents with sodium of 132, likely secondary to poor po intake  · Improving with increased po intake at 136        Medical Problems             Resolved Problems  Date Reviewed: 2022          Resolved    Hyponatremia 2022     Resolved by  Jinger Apgar, DO              Discharging Physician / Practitioner: Jinger Apgar, DO  PCP: Alie Christy MD  Admission Date:   Admission Orders (From admission, onward)     Ordered        22 1620  Inpatient Admission  Once                      Discharge Date: 02/12/22    Consultations During Hospital Stay:  · none     Procedures Performed:   · none     Significant Findings / Test Results:   · UTI  · Anemia  · Asymptomatic hypercalcemia     Incidental Findings:   · See above      Test Results Pending at Discharge (will require follow up):   · F/U PTH - rp     Outpatient Tests Requested:  · Repeat CBC 1 week to assess anemia  · Repeat renal function panel in 1 week to assess calcium     Complications:  none     Reason for Admission: UTI and ambulatory dysfunction      Hospital Course:   Sandra Aldana is a 80 y  o  female patient who originally presented to the hospital on 2/7/2022 due to recurrent falls   On arrival patient was noted to have a UTI which she was treated with ceftriaxone for 3 days   Procalcitonin was negative x2 and antibiotics were discontinued urine culture appears to be contaminated   Patient was also evaluated by Physical therapy and Occupational therapy for recurrent falls  Jason Humphrey recommend discharge to rehab facility      During her hospital stay patient was noted to be anemic with hemoglobin of 7 8   It appears that patient's hemoglobin has been in the seven range since November 2021  On 02/09 hemoglobin noted to be 6 5 and patient received 1 unit PRBC improving hemoglobin 7 9   Obtained FOBT which was negative for blood   Patient received Venofer 300 x 2  Hemoglobin on 2/11 noted to trend down again to 7 6 and gave 1 more unit of prbc  Hg today is at 10  Will discharge patient with hematology referral   Discussed with patient and her son regarding colon cancer as possible source of anemia   At this point given her age would like to hold off on colonoscopy   Her oral Iron has been changed to 3 times weekly as it can cause constipation                  Please see above list of diagnoses and related plan for additional information       Condition at Discharge: good    Discharge Day Visit / Exam: Subjective:  Patient seen examined at bedside   Patient currently has no complaints   Patient states that she feels well   Denies having abdominal pain nausea or vomiting  Vitals: Blood Pressure: 164/81 (02/12/22 0817)  Pulse: 93 (02/12/22 0817)  Temperature: 98 1 °F (36 7 °C) (02/12/22 0817)  Temp Source: Oral (02/12/22 0817)  Respirations: 18 (02/12/22 0817)  Height: 5' 1" (154 9 cm) (02/08/22 3714)  Weight - Scale: 71 4 kg (157 lb 6 5 oz) (02/12/22 0600)  SpO2: 96 % (02/12/22 0817)  Exam:   Physical Exam  Vitals reviewed  HENT:      Head: Normocephalic and atraumatic       Right Ear: External ear normal       Left Ear: External ear normal       Nose: Nose normal       Mouth/Throat:      Mouth: Mucous membranes are moist       Pharynx: Oropharynx is clear  Eyes:      Extraocular Movements: Extraocular movements intact  Cardiovascular:      Rate and Rhythm: Normal rate and regular rhythm       Pulses: Normal pulses       Heart sounds: Normal heart sounds  Pulmonary:      Effort: Pulmonary effort is normal       Breath sounds: Normal breath sounds  Abdominal:      General: Abdomen is flat  There is no distension       Palpations: Abdomen is soft       Tenderness: There is no abdominal tenderness  Musculoskeletal:      Cervical back: Normal range of motion       Right lower leg: No edema       Left lower leg: No edema  Skin:     General: Skin is warm and dry  Neurological:      General: No focal deficit present       Mental Status: She is alert and oriented to person, place, and time  Psychiatric: Geraldine Hess and Affect: Mood normal          Behavior: Behavior normal      Discussion with Family: Updated  (son) via phone  Discharge instructions/Information to patient and family:   See after visit summary for information provided to patient and family        Provisions for Follow-Up Care:  See after visit summary for information related to follow-up care and any pertinent home health orders  Disposition:   Acute Rehab at [facility] Readmission: no     Discharge Statement:  I spent 45 minutes discharging the patient  This time was spent on the day of discharge  I had direct contact with the patient on the day of discharge  Greater than 50% of the total time was spent examining patient, answering all patient questions, arranging and discussing plan of care with patient as well as directly providing post-discharge instructions  Additional time then spent on discharge activities  Discharge Medications:  See after visit summary for reconciled discharge medications provided to patient and/or family        **Please Note: This note may have been constructed using a voice recognition system**

## 2022-02-12 NOTE — ASSESSMENT & PLAN NOTE
· Patient presents with Ca of 12 1, unclear etiology, asymptomatic  · PTH decreased at 6 5 which is appropriately decreased ,  · PTH-rp pending,   · V25(OH)D slightly low at 24,   · urinary calcium level normal at 14 8, ruling out familial hypocalciuric hypercalcemia  · PTH-rp is still pending if positive consider CT chest to assess for SCC   · D 1,25 OH 35 within normal limits ruling out granulomatous disease and CXR negative for any findings  · Suspect elevated calcium is 2/2  hypercalcemia of imobilization,   · Currently asymptomatic  · Will hold Vitamin D on discharge as it can increase calcium levels

## 2022-02-12 NOTE — ASSESSMENT & PLAN NOTE
· From taking Dilantin for 30 years - discontinued 5 years ago and patient was switched to [employer] which has seemed to result in less issues with ataxia  · Followed by a Neurologist

## 2022-02-13 LAB
BACTERIA BLD CULT: NORMAL
BACTERIA BLD CULT: NORMAL
HEMOCCULT STL QL: NEGATIVE

## 2022-02-14 NOTE — UTILIZATION REVIEW
Notification of Discharge   This is a Notification of Discharge from our facility [address]  Please be advised that this patient has been discharge from our facility  Below you will find the admission and discharge date and time including the patients disposition  UTILIZATION REVIEW CONTACT:  Du Coffee  Utilization   Network Utilization Review Department  Phone: [phone] x carefully listen to the prompts  All voicemails are confidential   Email: [email or web address]     PHYSICIAN ADVISORY SERVICES:  FOR [id number] REVIEW - MEDICAL NECESSITY DENIAL  Phone: [phone]  Fax: [phone]  Email: [email or web address]     PRESENTATION DATE: 2/7/2022 12:46 PM  OBERVATION ADMISSION DATE:  INPATIENT ADMISSION DATE: 2/7/22  4:20 PM   DISCHARGE DATE: 2/12/2022  6:00 PM  DISPOSITION: Non [facility] Non [facility]      IMPORTANT INFORMATION:  Send all requests for admission clinical reviews, approved or denied determinations and any other requests to dedicated fax number below belonging to the campus where the patient is receiving treatment   List of dedicated fax numbers:  [address] DENIALS (Administrative/Medical Necessity) [phone]   [address] (Maternity/NICU/Pediatrics) [phone]   [facility] [phone]   130 [facility] [phone]   [address] [place] [address] [phone]   2000 [facility] [address] [place] [address],[address] [phone]   [facility]  [phone]   [address] [place] [address]  2401 [facility] 1000 [address] [facility] [phone]

## 2022-02-15 LAB — PTH RELATED PROT SERPL-SCNC: <2 PMOL/L

## 2022-02-18 LAB — METHYLMALONATE SERPL-SCNC: 259 NMOL/L (ref 0–378)

## 2022-03-01 ENCOUNTER — TELEPHONE (OUTPATIENT)
Dept: HEMATOLOGY ONCOLOGY | Facility: CLINIC | Age: 87
End: 2022-03-01

## 2022-03-17 ENCOUNTER — APPOINTMENT (EMERGENCY)
Dept: CT IMAGING | Facility: HOSPITAL | Age: 87
DRG: 686 | End: 2022-03-17
Payer: COMMERCIAL

## 2022-03-17 ENCOUNTER — HOSPITAL ENCOUNTER (INPATIENT)
Facility: HOSPITAL | Age: 87
LOS: 3 days | Discharge: NURSING, REHAB OR LONG-TERM CARE | DRG: 686 | End: 2022-03-20
Attending: EMERGENCY MEDICINE | Admitting: FAMILY MEDICINE
Payer: COMMERCIAL

## 2022-03-17 DIAGNOSIS — E83.52 HYPERCALCEMIA: ICD-10-CM

## 2022-03-17 DIAGNOSIS — E87.6 HYPOKALEMIA: ICD-10-CM

## 2022-03-17 DIAGNOSIS — N28.89 RENAL MASS: ICD-10-CM

## 2022-03-17 DIAGNOSIS — K59.00 CONSTIPATION: Primary | ICD-10-CM

## 2022-03-17 DIAGNOSIS — R10.84 GENERALIZED ABDOMINAL PAIN: ICD-10-CM

## 2022-03-17 DIAGNOSIS — C64.2 RENAL CELL CARCINOMA OF LEFT KIDNEY (HCC): ICD-10-CM

## 2022-03-17 DIAGNOSIS — N30.00 ACUTE CYSTITIS WITHOUT HEMATURIA: ICD-10-CM

## 2022-03-17 DIAGNOSIS — I82.3 RENAL VEIN THROMBOSIS (HCC): ICD-10-CM

## 2022-03-17 LAB
ALBUMIN SERPL BCP-MCNC: 1.8 G/DL (ref 3.5–5)
ALP SERPL-CCNC: 125 U/L (ref 46–116)
ALT SERPL W P-5'-P-CCNC: 16 U/L (ref 12–78)
AMORPH URATE CRY URNS QL MICRO: ABNORMAL /HPF
ANION GAP SERPL CALCULATED.3IONS-SCNC: 4 MMOL/L (ref 4–13)
APTT PPP: 33 SECONDS (ref 23–37)
AST SERPL W P-5'-P-CCNC: 20 U/L (ref 5–45)
BACTERIA UR QL AUTO: ABNORMAL /HPF
BASOPHILS # BLD AUTO: 0.03 THOUSANDS/ΜL (ref 0–0.1)
BASOPHILS NFR BLD AUTO: 0 % (ref 0–1)
BILIRUB SERPL-MCNC: 0.56 MG/DL (ref 0.2–1)
BILIRUB UR QL STRIP: ABNORMAL
BUN SERPL-MCNC: 15 MG/DL (ref 5–25)
CALCIUM ALBUM COR SERPL-MCNC: 13.1 MG/DL (ref 8.3–10.1)
CALCIUM SERPL-MCNC: 11.3 MG/DL (ref 8.3–10.1)
CHLORIDE SERPL-SCNC: 98 MMOL/L (ref 100–108)
CLARITY UR: ABNORMAL
CO2 SERPL-SCNC: 31 MMOL/L (ref 21–32)
COLOR UR: YELLOW
CREAT SERPL-MCNC: 1 MG/DL (ref 0.6–1.3)
EOSINOPHIL # BLD AUTO: 0.11 THOUSAND/ΜL (ref 0–0.61)
EOSINOPHIL NFR BLD AUTO: 1 % (ref 0–6)
ERYTHROCYTE [DISTWIDTH] IN BLOOD BY AUTOMATED COUNT: 25 % (ref 11.6–15.1)
GFR SERPL CREATININE-BSD FRML MDRD: 49 ML/MIN/1.73SQ M
GLUCOSE SERPL-MCNC: 112 MG/DL (ref 65–140)
GLUCOSE UR STRIP-MCNC: NEGATIVE MG/DL
HCT VFR BLD AUTO: 37.4 % (ref 34.8–46.1)
HGB BLD-MCNC: 11.7 G/DL (ref 11.5–15.4)
HGB UR QL STRIP.AUTO: ABNORMAL
IMM GRANULOCYTES # BLD AUTO: 0.05 THOUSAND/UL (ref 0–0.2)
IMM GRANULOCYTES NFR BLD AUTO: 1 % (ref 0–2)
INR PPP: 1.13 (ref 0.84–1.19)
KETONES UR STRIP-MCNC: NEGATIVE MG/DL
LACTATE SERPL-SCNC: 1.2 MMOL/L (ref 0.5–2)
LEUKOCYTE ESTERASE UR QL STRIP: ABNORMAL
LIPASE SERPL-CCNC: 49 U/L (ref 73–393)
LYMPHOCYTES # BLD AUTO: 1.75 THOUSANDS/ΜL (ref 0.6–4.47)
LYMPHOCYTES NFR BLD AUTO: 17 % (ref 14–44)
MCH RBC QN AUTO: 23.7 PG (ref 26.8–34.3)
MCHC RBC AUTO-ENTMCNC: 31.3 G/DL (ref 31.4–37.4)
MCV RBC AUTO: 76 FL (ref 82–98)
MONOCYTES # BLD AUTO: 1.08 THOUSAND/ΜL (ref 0.17–1.22)
MONOCYTES NFR BLD AUTO: 11 % (ref 4–12)
MUCOUS THREADS UR QL AUTO: ABNORMAL
NEUTROPHILS # BLD AUTO: 7.29 THOUSANDS/ΜL (ref 1.85–7.62)
NEUTS SEG NFR BLD AUTO: 70 % (ref 43–75)
NITRITE UR QL STRIP: NEGATIVE
NON-SQ EPI CELLS URNS QL MICRO: ABNORMAL /HPF
NRBC BLD AUTO-RTO: 0 /100 WBCS
OTHER STN SPEC: ABNORMAL
PH UR STRIP.AUTO: 6 [PH]
PLATELET # BLD AUTO: 376 THOUSANDS/UL (ref 149–390)
PMV BLD AUTO: 9.4 FL (ref 8.9–12.7)
POTASSIUM SERPL-SCNC: 2.8 MMOL/L (ref 3.5–5.3)
PROT SERPL-MCNC: 6.7 G/DL (ref 6.4–8.2)
PROT UR STRIP-MCNC: ABNORMAL MG/DL
PROTHROMBIN TIME: 14.4 SECONDS (ref 11.6–14.5)
RBC # BLD AUTO: 4.94 MILLION/UL (ref 3.81–5.12)
RBC #/AREA URNS AUTO: ABNORMAL /HPF
SODIUM SERPL-SCNC: 133 MMOL/L (ref 136–145)
SP GR UR STRIP.AUTO: 1.02 (ref 1–1.03)
UROBILINOGEN UR QL STRIP.AUTO: 1 E.U./DL
WBC # BLD AUTO: 10.31 THOUSAND/UL (ref 4.31–10.16)
WBC #/AREA URNS AUTO: ABNORMAL /HPF
WBC CLUMPS # UR AUTO: PRESENT /UL

## 2022-03-17 PROCEDURE — 81001 URINALYSIS AUTO W/SCOPE: CPT | Performed by: EMERGENCY MEDICINE

## 2022-03-17 PROCEDURE — 36415 COLL VENOUS BLD VENIPUNCTURE: CPT | Performed by: EMERGENCY MEDICINE

## 2022-03-17 PROCEDURE — 99223 1ST HOSP IP/OBS HIGH 75: CPT

## 2022-03-17 PROCEDURE — 83690 ASSAY OF LIPASE: CPT | Performed by: EMERGENCY MEDICINE

## 2022-03-17 PROCEDURE — 96375 TX/PRO/DX INJ NEW DRUG ADDON: CPT

## 2022-03-17 PROCEDURE — 93005 ELECTROCARDIOGRAM TRACING: CPT

## 2022-03-17 PROCEDURE — 85730 THROMBOPLASTIN TIME PARTIAL: CPT | Performed by: EMERGENCY MEDICINE

## 2022-03-17 PROCEDURE — 87086 URINE CULTURE/COLONY COUNT: CPT | Performed by: EMERGENCY MEDICINE

## 2022-03-17 PROCEDURE — 87077 CULTURE AEROBIC IDENTIFY: CPT | Performed by: EMERGENCY MEDICINE

## 2022-03-17 PROCEDURE — 85025 COMPLETE CBC W/AUTO DIFF WBC: CPT | Performed by: EMERGENCY MEDICINE

## 2022-03-17 PROCEDURE — 96365 THER/PROPH/DIAG IV INF INIT: CPT

## 2022-03-17 PROCEDURE — 80053 COMPREHEN METABOLIC PANEL: CPT | Performed by: EMERGENCY MEDICINE

## 2022-03-17 PROCEDURE — 83605 ASSAY OF LACTIC ACID: CPT | Performed by: EMERGENCY MEDICINE

## 2022-03-17 PROCEDURE — 99285 EMERGENCY DEPT VISIT HI MDM: CPT | Performed by: EMERGENCY MEDICINE

## 2022-03-17 PROCEDURE — 74177 CT ABD & PELVIS W/CONTRAST: CPT

## 2022-03-17 PROCEDURE — 99285 EMERGENCY DEPT VISIT HI MDM: CPT

## 2022-03-17 PROCEDURE — 85610 PROTHROMBIN TIME: CPT | Performed by: EMERGENCY MEDICINE

## 2022-03-17 RX ORDER — SODIUM CHLORIDE 9 MG/ML
125 INJECTION, SOLUTION INTRAVENOUS CONTINUOUS
Status: DISCONTINUED | OUTPATIENT
Start: 2022-03-17 | End: 2022-03-20 | Stop reason: HOSPADM

## 2022-03-17 RX ORDER — FERROUS SULFATE 325(65) MG
325 TABLET ORAL 3 TIMES WEEKLY
Status: DISCONTINUED | OUTPATIENT
Start: 2022-03-18 | End: 2022-03-20 | Stop reason: HOSPADM

## 2022-03-17 RX ORDER — BISACODYL 10 MG
10 SUPPOSITORY, RECTAL RECTAL AS NEEDED
Status: DISCONTINUED | OUTPATIENT
Start: 2022-03-17 | End: 2022-03-20 | Stop reason: HOSPADM

## 2022-03-17 RX ORDER — OMEPRAZOLE 20 MG/1
20 CAPSULE, DELAYED RELEASE ORAL DAILY
COMMUNITY

## 2022-03-17 RX ORDER — LEVETIRACETAM 500 MG/1
500 TABLET ORAL 2 TIMES DAILY
Status: DISCONTINUED | OUTPATIENT
Start: 2022-03-17 | End: 2022-03-20 | Stop reason: HOSPADM

## 2022-03-17 RX ORDER — BISACODYL 10 MG
10 SUPPOSITORY, RECTAL RECTAL AS NEEDED
COMMUNITY

## 2022-03-17 RX ORDER — TRAMADOL HYDROCHLORIDE 50 MG/1
50 TABLET ORAL EVERY 6 HOURS PRN
Status: DISCONTINUED | OUTPATIENT
Start: 2022-03-17 | End: 2022-03-18

## 2022-03-17 RX ORDER — PANTOPRAZOLE SODIUM 40 MG/1
40 TABLET, DELAYED RELEASE ORAL
Status: DISCONTINUED | OUTPATIENT
Start: 2022-03-18 | End: 2022-03-20 | Stop reason: HOSPADM

## 2022-03-17 RX ORDER — CEFTRIAXONE 1 G/50ML
1000 INJECTION, SOLUTION INTRAVENOUS EVERY 24 HOURS
Status: DISCONTINUED | OUTPATIENT
Start: 2022-03-17 | End: 2022-03-20 | Stop reason: HOSPADM

## 2022-03-17 RX ORDER — POTASSIUM CHLORIDE 14.9 MG/ML
20 INJECTION INTRAVENOUS ONCE
Status: COMPLETED | OUTPATIENT
Start: 2022-03-17 | End: 2022-03-17

## 2022-03-17 RX ORDER — POTASSIUM CHLORIDE 20 MEQ/1
40 TABLET, EXTENDED RELEASE ORAL ONCE
Status: DISCONTINUED | OUTPATIENT
Start: 2022-03-17 | End: 2022-03-18

## 2022-03-17 RX ORDER — LISINOPRIL 20 MG/1
20 TABLET ORAL DAILY
Status: DISCONTINUED | OUTPATIENT
Start: 2022-03-18 | End: 2022-03-20 | Stop reason: HOSPADM

## 2022-03-17 RX ORDER — ACETAMINOPHEN 325 MG/1
650 TABLET ORAL EVERY 6 HOURS PRN
Status: DISCONTINUED | OUTPATIENT
Start: 2022-03-17 | End: 2022-03-20 | Stop reason: HOSPADM

## 2022-03-17 RX ORDER — AMOXICILLIN 250 MG
2 CAPSULE ORAL
Status: DISCONTINUED | OUTPATIENT
Start: 2022-03-17 | End: 2022-03-20 | Stop reason: HOSPADM

## 2022-03-17 RX ORDER — TRAMADOL HYDROCHLORIDE 50 MG/1
50 TABLET ORAL EVERY 6 HOURS PRN
Status: DISCONTINUED | OUTPATIENT
Start: 2022-03-17 | End: 2022-03-17

## 2022-03-17 RX ORDER — SODIUM CHLORIDE 9 MG/ML
125 INJECTION, SOLUTION INTRAVENOUS CONTINUOUS
Status: DISCONTINUED | OUTPATIENT
Start: 2022-03-17 | End: 2022-03-17

## 2022-03-17 RX ADMIN — DOCUSATE SODIUM AND SENNOSIDES 2 TABLET: 8.6; 5 TABLET ORAL at 22:33

## 2022-03-17 RX ADMIN — CEFTRIAXONE 1000 MG: 1 INJECTION, SOLUTION INTRAVENOUS at 22:32

## 2022-03-17 RX ADMIN — POTASSIUM CHLORIDE 20 MEQ: 14.9 INJECTION, SOLUTION INTRAVENOUS at 19:30

## 2022-03-17 RX ADMIN — SODIUM CHLORIDE 1000 ML: 0.9 INJECTION, SOLUTION INTRAVENOUS at 19:21

## 2022-03-17 RX ADMIN — MORPHINE SULFATE 2 MG: 2 INJECTION, SOLUTION INTRAMUSCULAR; INTRAVENOUS at 19:19

## 2022-03-17 RX ADMIN — SODIUM CHLORIDE 125 ML/HR: 9 INJECTION, SOLUTION INTRAVENOUS at 20:20

## 2022-03-17 RX ADMIN — IOHEXOL 100 ML: 350 INJECTION, SOLUTION INTRAVENOUS at 19:09

## 2022-03-17 RX ADMIN — LEVETIRACETAM 500 MG: 500 TABLET, FILM COATED ORAL at 22:33

## 2022-03-17 NOTE — ED PROVIDER NOTES
History  Chief Complaint   Patient presents with    Abdominal Pain     Patient is a 72-year-old female who presents the emergency department from local nursing facility due to a abdominal distension and with a reported to be abnormal labs to EMS  In review of the patient's chart the abnormal lab results appears to have been and abdominal x-ray resolve which showed colonic ileus  Patient does complain of abdominal pain and constipation and also reports pain all over and not feeling well  She denies any vomiting or diarrhea or fevers or chills  Patient reports no prior abdominal surgeries  History provided by:  Patient, EMS personnel and nursing home  Abdominal Pain  Pain location:  Generalized  Pain quality: aching and cramping    Pain severity:  Moderate  Onset quality:  Gradual  Duration:  3 days  Timing:  Constant  Progression:  Worsening  Chronicity:  New  Associated symptoms: constipation    Associated symptoms: no chest pain, no chills, no cough, no diarrhea, no dysuria, no fatigue, no fever, no hematuria, no nausea, no shortness of breath, no sore throat and no vomiting        Prior to Admission Medications   Prescriptions Last Dose Informant Patient Reported? Taking?    LORazepam (ATIVAN) 0 5 mg tablet   Yes No   Sig: Take 0 5 mg by mouth every 8 (eight) hours as needed for anxiety   ferrous sulfate 325 (65 Fe) mg tablet   No No   Sig: Take 1 tablet (325 mg total) by mouth 3 (three) times a week   glucosamine-chondroitin 500-400 MG tablet   Yes No   Sig: Take 1 tablet by mouth 2 (two) times a day   levETIRAcetam (KEPPRA) 500 mg tablet   Yes No   Sig: Take 500 mg by mouth daily   lisinopril (ZESTRIL) 20 mg tablet   Yes No   Sig: Take 20 mg by mouth daily   pantoprazole (PROTONIX) 40 mg tablet   No No   Sig: Take 1 tablet (40 mg total) by mouth daily in the early morning   senna-docusate sodium (SENOKOT S) 8 6-50 mg per tablet   No No   Sig: Take 2 tablets by mouth daily at bedtime   traMADol (ULTRAM) 50 mg tablet   Yes No   Sig: Take 50 mg by mouth every 6 (six) hours as needed for moderate pain      Facility-Administered Medications: None       Past Medical History:   Diagnosis Date    Arthritis     Hypertension        Past Surgical History:   Procedure Laterality Date    TONSILLECTOMY         History reviewed  No pertinent family history  I have reviewed and agree with the history as documented  E-Cigarette/Vaping    E-Cigarette Use Never User      E-Cigarette/Vaping Substances     Social History     Tobacco Use    Smoking status: Never Smoker    Smokeless tobacco: Never Used   Vaping Use    Vaping Use: Never used   Substance Use Topics    Alcohol use: Never    Drug use: Never       Review of Systems   Constitutional: Negative for activity change, appetite change, chills, fatigue and fever  HENT: Negative for congestion, ear pain, rhinorrhea and sore throat  Eyes: Negative for discharge, redness and visual disturbance  Respiratory: Negative for cough, chest tightness, shortness of breath and wheezing  Cardiovascular: Negative for chest pain and palpitations  Gastrointestinal: Positive for abdominal distention, abdominal pain and constipation  Negative for diarrhea, nausea and vomiting  Endocrine: Negative for polydipsia and polyuria  Genitourinary: Negative for difficulty urinating, dysuria, frequency, hematuria and urgency  Musculoskeletal: Negative for arthralgias and myalgias  Skin: Negative for color change, pallor and rash  Neurological: Negative for dizziness, weakness, light-headedness, numbness and headaches  Hematological: Negative for adenopathy  Does not bruise/bleed easily  All other systems reviewed and are negative  Physical Exam  Physical Exam  Vitals and nursing note reviewed  Constitutional:       Appearance: She is well-developed  HENT:      Head: Normocephalic and atraumatic        Right Ear: External ear normal       Left Ear: External ear normal       Nose: Nose normal    Eyes:      Conjunctiva/sclera: Conjunctivae normal       Pupils: Pupils are equal, round, and reactive to light  Cardiovascular:      Rate and Rhythm: Normal rate and regular rhythm  Heart sounds: Normal heart sounds  Pulmonary:      Effort: Pulmonary effort is normal  No respiratory distress  Breath sounds: Normal breath sounds  No wheezing or rales  Chest:      Chest wall: No tenderness  Abdominal:      General: Bowel sounds are normal  There is distension  Palpations: Abdomen is soft  Tenderness: There is abdominal tenderness  There is no guarding or rebound  Musculoskeletal:         General: Normal range of motion  Cervical back: Normal range of motion and neck supple  Skin:     General: Skin is warm and dry  Neurological:      Mental Status: She is alert and oriented to person, place, and time  Cranial Nerves: No cranial nerve deficit  Sensory: No sensory deficit           Vital Signs  ED Triage Vitals   Temperature Pulse Respirations Blood Pressure SpO2   03/17/22 1812 03/17/22 1812 03/17/22 1838 03/17/22 1812 03/17/22 1812   97 9 °F (36 6 °C) 91 16 162/74 91 %      Temp Source Heart Rate Source Patient Position - Orthostatic VS BP Location FiO2 (%)   03/17/22 1812 03/17/22 1812 03/17/22 1812 03/17/22 1812 --   Temporal Monitor Lying Left arm       Pain Score       03/17/22 1812       No Pain           Vitals:    03/17/22 1845 03/17/22 1930 03/17/22 1945 03/17/22 2000   BP: 147/63 145/67 144/65 130/61   Pulse: 85 87 84 79   Patient Position - Orthostatic VS:             Visual Acuity      ED Medications  Medications   potassium chloride (K-DUR,KLOR-CON) CR tablet 40 mEq (40 mEq Oral Not Given 3/17/22 1922)   potassium chloride 20 mEq IVPB (premix) (20 mEq Intravenous New Bag 3/17/22 1930)   sodium chloride 0 9 % infusion (125 mL/hr Intravenous New Bag 3/17/22 2020)   sodium chloride 0 9 % bolus 1,000 mL (0 mL Intravenous Stopped 3/17/22 2020)   morphine injection 2 mg (2 mg Intravenous Given 3/17/22 1919)   iohexol (OMNIPAQUE) 350 MG/ML injection (SINGLE-DOSE) 100 mL (100 mL Intravenous Given 3/17/22 1909)       Diagnostic Studies  Results Reviewed     Procedure Component Value Units Date/Time    UA w Reflex to Microscopic w Reflex to Culture [[id number]]  (Abnormal) Collected: 03/17/22 1924    Lab Status: Final result Specimen: Urine, Indwelling [place] Catheter Updated: 03/17/22 1946     Color, UA Yellow     Clarity, UA Slightly Cloudy     Specific [place], UA 1 020     pH, UA 6 0     Leukocytes, UA Small     Nitrite, UA Negative     Protein, UA 30 (1+) mg/dl      Glucose, UA Negative mg/dl      Ketones, UA Negative mg/dl      Urobilinogen, UA 1 0 E U /dl      Bilirubin, UA Small     Blood, UA Trace-lysed    Urine Microscopic [[id number]] Collected: 03/17/22 1924    Lab Status:  In process Specimen: Urine, Indwelling [place] Catheter Updated: 03/17/22 1946    Comprehensive metabolic panel [[id number]]  (Abnormal) Collected: 03/17/22 1821    Lab Status: Final result Specimen: Blood from Hand, Left Updated: 03/17/22 1853     Sodium 133 mmol/L      Potassium 2 8 mmol/L      Chloride 98 mmol/L      CO2 31 mmol/L      ANION GAP 4 mmol/L      BUN 15 mg/dL      Creatinine 1 00 mg/dL      Glucose 112 mg/dL      Calcium 11 3 mg/dL      Corrected Calcium 13 1 mg/dL      AST 20 U/L      ALT 16 U/L      Alkaline Phosphatase 125 U/L      Total Protein 6 7 g/dL      Albumin 1 8 g/dL      Total Bilirubin 0 56 mg/dL      eGFR 49 ml/min/1 73sq m     Narrative:      [place]ojana guidelines for Chronic Kidney Disease (CKD):     Stage 1 with normal or high GFR (GFR > 90 mL/min/1 73 square meters)    Stage 2 Mild CKD (GFR = 60-89 mL/min/1 73 square meters)    Stage 3A Moderate CKD (GFR = 45-59 mL/min/1 73 square meters)    Stage 3B Moderate CKD (GFR = 30-44 mL/min/1 73 square meters)    Stage 4 Severe CKD (GFR = 15-29 mL/min/1 73 square meters)    Stage 5 End Stage CKD (GFR <15 mL/min/1 73 square meters)  Note: GFR calculation is accurate only with a steady state creatinine    Lactic acid [[id number]]  (Normal) Collected: 03/17/22 1821    Lab Status: Final result Specimen: Blood from Hand, Left Updated: 03/17/22 1851     LACTIC ACID 1 2 mmol/L     Narrative:      Result may be elevated if tourniquet was used during collection  Lipase [[id number]]  (Abnormal) Collected: 03/17/22 1821    Lab Status: Final result Specimen: Blood from Hand, Left Updated: 03/17/22 1848     Lipase 49 u/L     Protime-INR [[id number]]  (Normal) Collected: 03/17/22 1821    Lab Status: Final result Specimen: Blood from Hand, Left Updated: 03/17/22 1843     Protime 14 4 seconds      INR 1 13    APTT [[id number]]  (Normal) Collected: 03/17/22 1821    Lab Status: Final result Specimen: Blood from Hand, Left Updated: 03/17/22 1843     PTT 33 seconds     CBC and differential [[id number]]  (Abnormal) Collected: 03/17/22 1821    Lab Status: Final result Specimen: Blood from Hand, Left Updated: 03/17/22 1829     WBC 10 31 Thousand/uL      RBC 4 94 Million/uL      Hemoglobin 11 7 g/dL      Hematocrit 37 4 %      MCV 76 fL      MCH 23 7 pg      MCHC 31 3 g/dL      RDW 25 0 %      MPV 9 4 fL      Platelets 406 Thousands/uL      nRBC 0 /100 WBCs      Neutrophils Relative 70 %      Immat GRANS % 1 %      Lymphocytes Relative 17 %      Monocytes Relative 11 %      Eosinophils Relative 1 %      Basophils Relative 0 %      Neutrophils Absolute 7 29 Thousands/µL      Immature Grans Absolute 0 05 Thousand/uL      Lymphocytes Absolute 1 75 Thousands/µL      Monocytes Absolute 1 08 Thousand/µL      Eosinophils Absolute 0 11 Thousand/µL      Basophils Absolute 0 03 Thousands/µL                  CT abdomen pelvis with contrast   Final Result by Macy Hayden MD (03/17 1951)   1    Heterogeneously enhancing left lower renal pole 4 2 x 2 1 cm mass concerning for renal cell carcinoma until disproven with associated left renal vein thrombosis  Moderate right pleural effusion with nodular pleural foci (also nodules on the left),    right lateral abdominal wall adjacent to the liver nodules and masses, left paraspinal soft tissue heterogeneously enhancing nodule, all concerning for metastatic disease   2  No acute findings  The study was marked in EPIC for significant notification  Workstation performed: [id number]                    Procedures  ECG 12 Lead Documentation Only    Date/Time: 3/17/2022 6:30 PM  Performed by: Vishal Polo DO  Authorized by: Vishal Polo DO     ECG reviewed by me, the ED Provider: yes    Patient location:  ED  Previous ECG:     Comparison to cardiac monitor: Yes    Quality:     Tracing quality:  Limited by artifact  Rate:     ECG rate:  88    ECG rate assessment: normal    Rhythm:     Rhythm: sinus rhythm    QRS:     QRS axis:  Normal    QRS intervals:  Normal  Conduction:     Conduction: normal    ST segments:     ST segments:  Non-specific  T waves:     T waves: non-specific               ED Course  ED Course as of 03/17/22 2031   u Mar 17, 2022   2009 Hypokalemia and hypercalcemia are noted treating with IV fluids and IV and oral potassium replacement in the ED now  Incidental findings of left renal mass and possible metastatic disease seen on CT scan patient updated made aware of these findings  I suspect the constipation and generalized pain is related to the hypercalcemia will continue with IV fluids and electrolyte replacement for now  Referred to the hospitalist for further evaluation monitoring and treatment       2029 Spoke with Elizabeth Corbett hospitalist nurse practitioner on-call reviewed case and findings in the emergency department and management thus far she accepts for admission on behalf of Dr Elsy Wayne                                  SBIRT 20yo+      Most Recent Value   SBIRT (23 yo +)    In order to provide better care to our patients, we are screening all of our patients for alcohol and drug use  Would it be okay to ask you these screening questions?  No Filed at: 03/17/2022 2014                    MDM  Number of Diagnoses or Management Options  Constipation: new and requires workup  Hypercalcemia: new and requires workup  Hypokalemia: new and requires workup  Renal mass: new and requires workup  Renal vein thrombosis ([facility] [address] ): new and requires workup     Amount and/or Complexity of Data Reviewed  Clinical lab tests: reviewed and ordered  Tests in the radiology section of CPT®: ordered and reviewed  Tests in the medicine section of CPT®: ordered and reviewed  Decide to obtain previous medical records or to obtain history from someone other than the patient: yes  Review and summarize past medical records: yes  Independent visualization of images, tracings, or specimens: yes    Risk of Complications, Morbidity, and/or Mortality  Presenting problems: moderate  Diagnostic procedures: moderate  Management options: moderate    Patient Progress  Patient progress: stable      Disposition  Final diagnoses:   Constipation   Hypercalcemia   Renal mass - Concerning for renal cell carcinoma   Renal vein thrombosis (HCC)   Hypokalemia     Time reflects when diagnosis was documented in both MDM as applicable and the Disposition within this note     Time User Action Codes Description Comment    3/17/2022  7:54 PM Geanie Barban Add [K59 00] Constipation     3/17/2022  7:55 PM Geanie Barban Add [E83 52] Hypercalcemia     3/17/2022  7:55 PM Geanie Barban Add [N28 89] Renal mass     3/17/2022  7:55 PM Geanie Barban Modify [N28 89] Renal mass Concerning for renal cell carcinoma    3/17/2022  7:55 PM Geanie Barban Add [I82 3] Renal vein thrombosis ([facility] [address] )     3/17/2022  7:57 PM Geanie Barban Add [E87 6] Hypokalemia       ED Disposition     ED Disposition Condition Date/Time Comment    Admit Stable Thu Mar 17, 2022  7:57 PM Case was discussed with Gisel and the patient's admission status was agreed to be Admission Status: inpatient status to the service of Dr Helga Richter         Follow-up Information    None         Patient's Medications   Discharge Prescriptions    No medications on file       No discharge procedures on file      PDMP Review     None          ED Provider  Electronically Signed by           Cece Gil DO  03/17/22 2034

## 2022-03-18 PROBLEM — M54.42 CHRONIC MIDLINE LOW BACK PAIN WITH BILATERAL SCIATICA: Status: ACTIVE | Noted: 2022-03-18

## 2022-03-18 PROBLEM — M54.41 CHRONIC MIDLINE LOW BACK PAIN WITH BILATERAL SCIATICA: Status: ACTIVE | Noted: 2022-03-18

## 2022-03-18 PROBLEM — C64.2 RENAL CELL CARCINOMA OF LEFT KIDNEY (HCC): Status: ACTIVE | Noted: 2022-03-18

## 2022-03-18 PROBLEM — G89.29 CHRONIC MIDLINE LOW BACK PAIN WITH BILATERAL SCIATICA: Status: ACTIVE | Noted: 2022-03-18

## 2022-03-18 LAB
ALBUMIN SERPL BCP-MCNC: 1.6 G/DL (ref 3.5–5)
ALP SERPL-CCNC: 109 U/L (ref 46–116)
ALT SERPL W P-5'-P-CCNC: 6 U/L (ref 12–78)
ANION GAP SERPL CALCULATED.3IONS-SCNC: 3 MMOL/L (ref 4–13)
ANION GAP SERPL CALCULATED.3IONS-SCNC: 5 MMOL/L (ref 4–13)
AST SERPL W P-5'-P-CCNC: 18 U/L (ref 5–45)
ATRIAL RATE: 88 BPM
BILIRUB SERPL-MCNC: 0.53 MG/DL (ref 0.2–1)
BUN SERPL-MCNC: 12 MG/DL (ref 5–25)
BUN SERPL-MCNC: 13 MG/DL (ref 5–25)
CALCIUM ALBUM COR SERPL-MCNC: 12.9 MG/DL (ref 8.3–10.1)
CALCIUM SERPL-MCNC: 10.7 MG/DL (ref 8.3–10.1)
CALCIUM SERPL-MCNC: 11 MG/DL (ref 8.3–10.1)
CHLORIDE SERPL-SCNC: 101 MMOL/L (ref 100–108)
CHLORIDE SERPL-SCNC: 99 MMOL/L (ref 100–108)
CO2 SERPL-SCNC: 27 MMOL/L (ref 21–32)
CO2 SERPL-SCNC: 31 MMOL/L (ref 21–32)
CREAT SERPL-MCNC: 0.8 MG/DL (ref 0.6–1.3)
CREAT SERPL-MCNC: 0.89 MG/DL (ref 0.6–1.3)
ERYTHROCYTE [DISTWIDTH] IN BLOOD BY AUTOMATED COUNT: 25.2 % (ref 11.6–15.1)
GFR SERPL CREATININE-BSD FRML MDRD: 57 ML/MIN/1.73SQ M
GFR SERPL CREATININE-BSD FRML MDRD: 65 ML/MIN/1.73SQ M
GLUCOSE SERPL-MCNC: 110 MG/DL (ref 65–140)
GLUCOSE SERPL-MCNC: 98 MG/DL (ref 65–140)
HCT VFR BLD AUTO: 35.4 % (ref 34.8–46.1)
HGB BLD-MCNC: 11.1 G/DL (ref 11.5–15.4)
MCH RBC QN AUTO: 23.9 PG (ref 26.8–34.3)
MCHC RBC AUTO-ENTMCNC: 31.4 G/DL (ref 31.4–37.4)
MCV RBC AUTO: 76 FL (ref 82–98)
P AXIS: 56 DEGREES
PLATELET # BLD AUTO: 352 THOUSANDS/UL (ref 149–390)
PLATELET # BLD AUTO: 367 THOUSANDS/UL (ref 149–390)
PMV BLD AUTO: 9.5 FL (ref 8.9–12.7)
PMV BLD AUTO: 9.7 FL (ref 8.9–12.7)
POTASSIUM SERPL-SCNC: 2.9 MMOL/L (ref 3.5–5.3)
POTASSIUM SERPL-SCNC: 3.8 MMOL/L (ref 3.5–5.3)
PR INTERVAL: 168 MS
PROT SERPL-MCNC: 6.3 G/DL (ref 6.4–8.2)
QRS AXIS: 63 DEGREES
QRSD INTERVAL: 74 MS
QT INTERVAL: 282 MS
QTC INTERVAL: 341 MS
RBC # BLD AUTO: 4.64 MILLION/UL (ref 3.81–5.12)
SODIUM SERPL-SCNC: 133 MMOL/L (ref 136–145)
SODIUM SERPL-SCNC: 133 MMOL/L (ref 136–145)
T WAVE AXIS: 62 DEGREES
VENTRICULAR RATE: 88 BPM
WBC # BLD AUTO: 8.77 THOUSAND/UL (ref 4.31–10.16)

## 2022-03-18 PROCEDURE — 85027 COMPLETE CBC AUTOMATED: CPT

## 2022-03-18 PROCEDURE — 80048 BASIC METABOLIC PNL TOTAL CA: CPT

## 2022-03-18 PROCEDURE — 85049 AUTOMATED PLATELET COUNT: CPT

## 2022-03-18 PROCEDURE — 93010 ELECTROCARDIOGRAM REPORT: CPT | Performed by: INTERNAL MEDICINE

## 2022-03-18 PROCEDURE — 99233 SBSQ HOSP IP/OBS HIGH 50: CPT | Performed by: FAMILY MEDICINE

## 2022-03-18 PROCEDURE — 36415 COLL VENOUS BLD VENIPUNCTURE: CPT

## 2022-03-18 PROCEDURE — 80053 COMPREHEN METABOLIC PANEL: CPT

## 2022-03-18 RX ORDER — HYDROMORPHONE HCL/PF 1 MG/ML
0.5 SYRINGE (ML) INJECTION EVERY 4 HOURS PRN
Status: DISCONTINUED | OUTPATIENT
Start: 2022-03-18 | End: 2022-03-20 | Stop reason: HOSPADM

## 2022-03-18 RX ORDER — LIDOCAINE 50 MG/G
1 PATCH TOPICAL EVERY 24 HOURS
Status: DISCONTINUED | OUTPATIENT
Start: 2022-03-18 | End: 2022-03-20 | Stop reason: HOSPADM

## 2022-03-18 RX ORDER — BENZONATATE 100 MG/1
100 CAPSULE ORAL 3 TIMES DAILY PRN
COMMUNITY

## 2022-03-18 RX ORDER — OXYCODONE HYDROCHLORIDE AND ACETAMINOPHEN 5; 325 MG/1; MG/1
1 TABLET ORAL EVERY 4 HOURS PRN
Status: DISCONTINUED | OUTPATIENT
Start: 2022-03-18 | End: 2022-03-20 | Stop reason: HOSPADM

## 2022-03-18 RX ORDER — LEVETIRACETAM 500 MG/1
500 TABLET ORAL DAILY
COMMUNITY
End: 2022-03-20 | Stop reason: HOSPADM

## 2022-03-18 RX ORDER — POTASSIUM CHLORIDE 29.8 MG/ML
40 INJECTION INTRAVENOUS ONCE
Status: DISCONTINUED | OUTPATIENT
Start: 2022-03-18 | End: 2022-03-18

## 2022-03-18 RX ORDER — POTASSIUM CHLORIDE 14.9 MG/ML
20 INJECTION INTRAVENOUS ONCE
Status: COMPLETED | OUTPATIENT
Start: 2022-03-18 | End: 2022-03-18

## 2022-03-18 RX ORDER — LIDOCAINE 50 MG/G
1 PATCH TOPICAL DAILY
COMMUNITY

## 2022-03-18 RX ORDER — FLUCONAZOLE 150 MG/1
150 TABLET ORAL DAILY
COMMUNITY

## 2022-03-18 RX ADMIN — POTASSIUM CHLORIDE 20 MEQ: 14.9 INJECTION, SOLUTION INTRAVENOUS at 03:56

## 2022-03-18 RX ADMIN — CEFTRIAXONE 1000 MG: 1 INJECTION, SOLUTION INTRAVENOUS at 21:20

## 2022-03-18 RX ADMIN — LEVETIRACETAM 500 MG: 500 TABLET, FILM COATED ORAL at 08:14

## 2022-03-18 RX ADMIN — FERROUS SULFATE TAB 325 MG (65 MG ELEMENTAL FE) 325 MG: 325 (65 FE) TAB at 08:14

## 2022-03-18 RX ADMIN — LISINOPRIL 20 MG: 10 TABLET ORAL at 08:14

## 2022-03-18 RX ADMIN — PANTOPRAZOLE SODIUM 40 MG: 40 TABLET, DELAYED RELEASE ORAL at 06:10

## 2022-03-18 RX ADMIN — ENOXAPARIN SODIUM 40 MG: 40 INJECTION SUBCUTANEOUS at 08:14

## 2022-03-18 RX ADMIN — LIDOCAINE 1 PATCH: 50 PATCH TOPICAL at 16:12

## 2022-03-18 RX ADMIN — SODIUM CHLORIDE 125 ML/HR: 9 INJECTION, SOLUTION INTRAVENOUS at 03:58

## 2022-03-18 RX ADMIN — LEVETIRACETAM 500 MG: 500 TABLET, FILM COATED ORAL at 21:19

## 2022-03-18 RX ADMIN — SODIUM CHLORIDE 125 ML/HR: 9 INJECTION, SOLUTION INTRAVENOUS at 16:04

## 2022-03-18 RX ADMIN — POTASSIUM CHLORIDE 20 MEQ: 14.9 INJECTION, SOLUTION INTRAVENOUS at 06:10

## 2022-03-18 RX ADMIN — DOCUSATE SODIUM AND SENNOSIDES 2 TABLET: 8.6; 5 TABLET ORAL at 21:19

## 2022-03-18 RX ADMIN — OXYCODONE HYDROCHLORIDE AND ACETAMINOPHEN 1 TABLET: 5; 325 TABLET ORAL at 15:34

## 2022-03-18 NOTE — CASE MANAGEMENT
Case Management Progress Note    Patient name Peyton Brenner  Location ED 09/ED 09 MRN [id number]  : 1932 Date 3/18/2022       LOS (days): 1  Geometric Mean LOS (GMLOS) (days): 3 30  Days to GMLOS:2 5        OBJECTIVE:        Current admission status: Inpatient  Preferred Pharmacy:   [address] [place] [address]  Phone: [phone] Fax: [phone]    Primary Care Provider: Wilder Pritchard MD    Primary Insurance: [facility]  Secondary Insurance:     PROGRESS NOTE:    Cm spoke with pt and pts family, who is at bedside (son and daughter), to discuss discharge planning  Pt and family have decided they would like pt to return to [address] [place] [address] on comfort care     Pts son, Madi Cobos, is pts POA for financial and Medical     Cm reached out to [address] [place] [address], awaiting reply     Spoke with Admissions at [address] [place] [address], they will accept back at time of dc, anticipating dc tomorrow, 3/19/22

## 2022-03-18 NOTE — PHYSICAL THERAPY NOTE
PHYSICAL THERAPY NOTE          Patient Name: Jorden Aldana  [id number] Date: 3/18/2022        03/18/22 0845   Note Type   Note type Screen   Additional Comments patient planning on returning to SNF on comfort care  Received order for PT consult  Chart reviewed  Patient admitted with diagnosis generalized abdominal pain  Per CM note, plan is for patient to return to [facility] on comfort care  Will D/C PT services at this time  Should the plan change and patient be appropriate for PT services, please re-consult       Marylu Katz, PT,DPT

## 2022-03-18 NOTE — PROGRESS NOTES
[address]  Progress Note - Arvind 2/6/1932, 80 y o  female MRN: [id number]  Unit/Bed#: -[facility] Encounter: [id number]  Primary Care Provider: Alie Christy MD   Date and time admitted to hospital: 3/17/2022  6:08 PM    * Generalized abdominal pain  Assessment & Plan  Presented from nursing home with abdominal pain, distension, abnormal labs  · CT abdomen renal mass 4 2 x 2 1 cm concerning for renal carcinoma with left renal vein thrombosis, moderate right pleural effusion with nodular pleural foci, right lateral abdominal wall adjacent to the liver nodules and masses, left paraspinal soft tissue heterogeneously enhancing nodule, all concerning for metastatic disease  · Patient given point generalized abdominal pain constipation with unknown last BM  · Generalized abdominal pain palpitation, abdominal distension  · Outpatient x-ray moderate colonic ileus  · Start bowel regimen  · Care everywhere shows phone call for heme Onc set up the patient hospitalized  · Pain management medication adjusted    Chronic midline low back pain with bilateral sciatica  Assessment & Plan  Radiating towards her lower extremities  Per chart review shows patient has history of spinal stenosis  Recent imaging shows suspected renal cell carcinoma with metastasis to liver, lungs and lumbar spine-which can also contribute to lower back pain and sciatica  Since patient has wish for DNR DNI and does not want to proceed with invasive procedure, and family member on bedside also agrees with patient wish, will focus on comfort care  With pain management  Pain medication adjusted    Continue other medication at this time    Renal cell carcinoma of left kidney [facility])  Assessment & Plan  Most likely based on imaging-since patient does not want to proceed any kind of invasive procedure  CT scan abdomen and pelvis shows: Left lower renal pole heterogeneous mass consistent with renal cell carcinoma until disproven measuring up to 6 4 x 6 5 cm   Thrombosis left renal vein  As per imaging suspect metastasis to liver, lungs and spine  Discuss with imaging findings with patient's family, son on bedside in details  Patient has living will which shows DNR DNI and patient does not want to proceed with any kind of invasive procedure  Just focus on comfort care  Case management on board  Continue other medication at this time    Hypercalcemia  Assessment & Plan  · Most likely secondary to underlying malignancy  · Corrected calcium 13 on admission  · Elevated alkaline phosphate likely due to possible metastasis  · Renal masses seen on CT    Anxiety  Assessment & Plan  · Outpatient P r n  Ativan, held  ·     Iron deficiency anemia  Assessment & Plan  · Continue ferrous sulfate m,w,f  · Hemoglobin 11 7      Seizure disorder (HCC)  Assessment & Plan  · Continue home Keppra  · Unknown time of last seizure    Acute cystitis without hematuria  Assessment & Plan  · UA, leukocytes, moderate bacteria- start 1 g Rocephin daily  · Leukocytosis 10 admission  · Afebrile   · Start IV fluids normal saline for mild hyponatremia likely dehydration  · Lactate 1 2    Essential hypertension  Assessment & Plan  · Continue lisinopril          VTE Pharmacologic Prophylaxis: VTE Score: 6 High Risk (Score >/= 5) - Pharmacological DVT Prophylaxis Ordered: enoxaparin (Lovenox)  Sequential Compression Devices Ordered  Patient Centered Rounds: I performed bedside rounds with nursing staff today  Discussions with Specialists or Other Care Team Provider:  None    Education and Discussions with Family / Patient: Updated  (son) at bedside  Time Spent for Care: 20 minutes  More than 50% of total time spent on counseling and coordination of care as described above      Current Length of Stay: 1 day(s)  Current Patient Status: Inpatient   Certification Statement: The patient will continue to require additional inpatient hospital stay due to To monitor above conditions  Discharge Plan: Anticipate discharge in 24-48 hrs to rehab facility  Code Status: Level 3 - DNAR and DNI    Subjective:   Seen and evaluated during the round  Patient resting comfortably, complaining of lower back pain  And leg pain  Objective:     Vitals:   Temp (24hrs), Av 9 °F (36 1 °C), Min:96 9 °F (36 1 °C), Max:96 9 °F (36 1 °C)    Temp:  [96 9 °F (36 1 °C)] 96 9 °F (36 1 °C)  HR:  [70-91] 87  Resp:  [16-17] 16  BP: (120-172)/(55-74) 153/74  SpO2:  [94 %-98 %] 96 %  Body mass index is 27 14 kg/m²  Input and Output Summary (last 24 hours): Intake/Output Summary (Last 24 hours) at 3/18/2022 1855  Last data filed at 3/18/2022 1536  Gross per 24 hour   Intake 1250 ml   Output 1100 ml   Net 150 ml       Physical Exam:   Physical Exam  Vitals and nursing note reviewed  Constitutional:       Appearance: She is not diaphoretic  HENT:      Nose: No congestion  Mouth/Throat:      Mouth: Mucous membranes are moist       Pharynx: No oropharyngeal exudate  Eyes:      General: No scleral icterus  Conjunctiva/sclera: Conjunctivae normal       Pupils: Pupils are equal, round, and reactive to light  Cardiovascular:      Rate and Rhythm: Normal rate  Heart sounds: No friction rub  No gallop  Pulmonary:      Effort: Pulmonary effort is normal  No respiratory distress  Breath sounds: No stridor  No wheezing or rhonchi  Abdominal:      General: Abdomen is flat  Bowel sounds are normal  There is no distension  Tenderness: There is abdominal tenderness ( generalized)  Musculoskeletal:         General: Tenderness (Lower back and lower extremity) present  Cervical back: Normal range of motion  Lymphadenopathy:      Cervical: No cervical adenopathy  Neurological:      Mental Status: She is alert and oriented to person, place, and time  Cranial Nerves: No cranial nerve deficit  Sensory: No sensory deficit           Additional Data:     Labs:  Results from last 7 days   Lab Units 03/18/22  0609 03/18/22  0021 03/17/22  1821   WBC Thousand/uL 8 77  --  10 31*   HEMOGLOBIN g/dL 11 1*  --  11 7   HEMATOCRIT % 35 4  --  37 4   PLATELETS Thousands/uL 367   < > 376   NEUTROS PCT %  --   --  70   LYMPHS PCT %  --   --  17   MONOS PCT %  --   --  11   EOS PCT %  --   --  1    < > = values in this interval not displayed       Results from last 7 days   Lab Units 03/18/22  0609   SODIUM mmol/L 133*   POTASSIUM mmol/L 3 8   CHLORIDE mmol/L 101   CO2 mmol/L 27   BUN mg/dL 12   CREATININE mg/dL 0 80   ANION GAP mmol/L 5   CALCIUM mg/dL 11 0*   ALBUMIN g/dL 1 6*   TOTAL BILIRUBIN mg/dL 0 53   ALK PHOS U/L 109   ALT U/L 6*   AST U/L 18   GLUCOSE RANDOM mg/dL 98     Results from last 7 days   Lab Units 03/17/22  1821   INR  1 13             Results from last 7 days   Lab Units 03/17/22  1821   LACTIC ACID mmol/L 1 2       Lines/Drains:  Invasive Devices  Report    Peripheral Intravenous Line            Peripheral IV 03/17/22 Right Antecubital <1 day          Drain            Urethral Catheter <1 day              Urinary Catheter:  Goal for removal: Remove after 48 hrs of I/O monitoring               Imaging: Reviewed radiology reports from this admission including: abdominal/pelvic CT    Recent Cultures (last 7 days):         Last 24 Hours Medication List:   Current Facility-Administered Medications   Medication Dose Route Frequency Provider Last Rate    acetaminophen  650 mg Oral Q6H PRN PHIL Ugalde      bisacodyl  10 mg Rectal PRN PHIL Ugalde      cefTRIAXone  1,000 mg Intravenous Q24H PHIL Ugalde Stopped (03/17/22 9791)    enoxaparin  40 mg Subcutaneous Daily PHIL Ugalde      ferrous sulfate  325 mg Oral Once per day on Mon Wed Fri PHLI Ugalde      HYDROmorphone  0 5 mg Intravenous Q4H PRN Chan Zapata MD      levETIRAcetam  500 mg Oral BID PHIL Ugalde      lidocaine  1 patch Topical Q24H Camden Cabrera MD      lisinopril  20 mg Oral Daily Mariely Perch, CRNP      oxyCODONE-acetaminophen  1 tablet Oral Q4H PRN Camden Cabrera MD      pantoprazole  40 mg Oral Early Morning Mariely Perch, CRNP      senna-docusate sodium  2 tablet Oral HS Mariely Perch, CRNP      sodium chloride  125 mL/hr Intravenous Continuous Cece Gil,  mL/hr (03/18/22 1604)        Today, Patient Was Seen By: Camden Cabrera MD    **Please Note: This note may have been constructed using a voice recognition system  **

## 2022-03-18 NOTE — PLAN OF CARE
Problem: Potential for Falls  Goal: Patient will remain free of falls  Description: INTERVENTIONS:  - Educate patient/family on patient safety including physical limitations  - Instruct patient to call for assistance with activity   - Consult OT/PT to assist with strengthening/mobility   - Keep Call bell within reach  - Keep bed low and locked with side rails adjusted as appropriate  - Keep care items and personal belongings within reach  - Initiate and maintain comfort rounds  - Make Fall Risk Sign visible to staff  - Offer Toileting every  Hours, in advance of need  - Initiate/Maintain alarm  - Obtain necessary fall risk management equipment:  - Apply yellow socks and bracelet for high fall risk patients  - Consider moving patient to room near nurses station  Outcome: Progressing     Problem: PAIN - ADULT  Goal: Verbalizes/displays adequate comfort level or baseline comfort level  Description: Interventions:  - Encourage patient to monitor pain and request assistance  - Assess pain using appropriate pain scale  - Administer analgesics based on type and severity of pain and evaluate response  - Implement non-pharmacological measures as appropriate and evaluate response  - Consider cultural and social influences on pain and pain management  - Notify physician/advanced practitioner if interventions unsuccessful or patient reports new pain  Outcome: Progressing

## 2022-03-18 NOTE — UTILIZATION REVIEW
Inpatient Admission Authorization Request   NOTIFICATION OF INPATIENT ADMISSION/INPATIENT AUTHORIZATION REQUEST   SERVICING FACILITY:   [address] [place] [address]  Chavo Ivy [address] [place] [address], [address]  Tax ID: [id number]  NPI: [id number]  Place of Service: Inpatient [address]  60[address]  Place of Service Code: 24     ATTENDING PROVIDER:  Attending Name and NPI#: Gino Ormond, Md [[id number]]  Address: Chavo Ivy  [facility] [address] Castro  Phone: [phone]     UTILIZATION REVIEW CONTACT:  Mar December, Utilization   Network Utilization Review Department  Phone: [phone]  Fax [phone]  Email: [email or web address]     PHYSICIAN ADVISORY SERVICES:  FOR TVBE-SE-SWWM REVIEW - MEDICAL NECESSITY DENIAL  Phone: [phone]  Fax: [phone]  Email: [email or web address]     TYPE OF REQUEST:  Inpatient Status     ADMISSION INFORMATION:  ADMISSION DATE/TIME: 3/17/22  8:30 PM  PATIENT DIAGNOSIS CODE/DESCRIPTION:  Unspecified abdominal pain [R10 9]  DISCHARGE DATE/TIME: No discharge date for patient encounter  IMPORTANT INFORMATION:  Please contact the Mar December directly with any questions or concerns regarding this request  Department voicemails are confidential     Send requests for admission clinical reviews, concurrent reviews, approvals, and administrative denials due to lack of clinical to fax [phone]

## 2022-03-18 NOTE — ASSESSMENT & PLAN NOTE
Most likely based on imaging-since patient does not want to proceed any kind of invasive procedure  CT scan abdomen and pelvis shows: Left lower renal pole heterogeneous mass consistent with renal cell carcinoma until disproven measuring up to 6 4 x 6 5 cm   Thrombosis left renal vein  As per imaging suspect metastasis to liver, lungs and spine  Discuss with imaging findings with patient's family, son on bedside in details  Patient has living will which shows DNR DNI and patient does not want to proceed with any kind of invasive procedure  Just focus on comfort care  Case management on board    Continue other medication at this time

## 2022-03-18 NOTE — ASSESSMENT & PLAN NOTE
· Most likely secondary to underlying malignancy  · Corrected calcium 13 on admission  · Elevated alkaline phosphate likely due to possible metastasis  · Renal masses seen on CT

## 2022-03-18 NOTE — ASSESSMENT & PLAN NOTE
Radiating towards her lower extremities  Per chart review shows patient has history of spinal stenosis  Recent imaging shows suspected renal cell carcinoma with metastasis to liver, lungs and lumbar spine-which can also contribute to lower back pain and sciatica  Since patient has wish for DNR DNI and does not want to proceed with invasive procedure, and family member on bedside also agrees with patient wish, will focus on comfort care  With pain management  Pain medication adjusted    Continue other medication at this time

## 2022-03-18 NOTE — ASSESSMENT & PLAN NOTE
Presented from nursing home with abdominal pain, distension, abnormal labs  · CT abdomen renal mass 4 2 x 2 1 cm concerning for renal carcinoma with left renal vein thrombosis, moderate right pleural effusion with nodular pleural foci, right lateral abdominal wall adjacent to the liver nodules and masses, left paraspinal soft tissue heterogeneously enhancing nodule, all concerning for metastatic disease  · Patient given point generalized abdominal pain constipation with unknown last BM  · Generalized abdominal pain palpitation, abdominal distension  · Outpatient x-ray moderate colonic ileus  · Start bowel regimen  · Care everywhere shows phone call for heme Onc set up the patient hospitalized  · Pain management medication adjusted

## 2022-03-18 NOTE — ASSESSMENT & PLAN NOTE
· Corrected calcium 13 on admission  · Elevated alkaline phosphate likely due to possible metastasis  · Renal masses seen on CT

## 2022-03-18 NOTE — ASSESSMENT & PLAN NOTE
· UA, leukocytes, moderate bacteria- start 1 g Rocephin daily  · Leukocytosis 10 admission  · Afebrile   · Start IV fluids normal saline for mild hyponatremia likely dehydration  · Lactate 1 2

## 2022-03-18 NOTE — ASSESSMENT & PLAN NOTE
Presented from nursing home with abdominal pain, distension, abnormal labs  · CT abdomen renal mass 4 2 x 2 1 cm concerning for renal carcinoma with left renal vein thrombosis, moderate right pleural effusion with nodular pleural foci, right lateral abdominal wall adjacent to the liver nodules and masses, left paraspinal soft tissue heterogeneously enhancing nodule, all concerning for metastatic disease  · Patient given point generalized abdominal pain constipation with unknown last BM  · Generalized abdominal pain palpitation, abdominal distension  · Outpatient x-ray moderate colonic ileus  · Start bowel regimen  · Care everywhere shows phone call for heme Onc set up the patient hospitalized  · Continue p r n   Tramadol for pain

## 2022-03-18 NOTE — H&P
[address] [facility] 2/6/1932, 80 y o  female MRN: [id number]  Unit/Bed#: ED 09 Encounter: [id number]  Primary Care Provider: Sancho Mota MD   Date and time admitted to hospital: 3/17/2022  6:08 PM    * Generalized abdominal pain  Assessment & Plan  Presented from nursing home with abdominal pain, distension, abnormal labs  · CT abdomen renal mass 4 2 x 2 1 cm concerning for renal carcinoma with left renal vein thrombosis, moderate right pleural effusion with nodular pleural foci, right lateral abdominal wall adjacent to the liver nodules and masses, left paraspinal soft tissue heterogeneously enhancing nodule, all concerning for metastatic disease  · Patient given point generalized abdominal pain constipation with unknown last BM  · Generalized abdominal pain palpitation, abdominal distension  · Start bowel regimen  · Care everywhere shows phone call for heme Onc set up the patient hospitalized  · Continue p r n  Tramadol for pain    Acute cystitis without hematuria  Assessment & Plan  · UA, leukocytes, moderate bacteria- start 1 g Rocephin daily  · Leukocytosis 10 admission  · Afebrile   · Start IV fluids normal saline for mild hyponatremia likely dehydration  · Lactate 1 2    Essential hypertension  Assessment & Plan  · Continue lisinopril    Seizure disorder (HCC)  Assessment & Plan  · Continue home Keppra  · Unknown time of last seizure    Iron deficiency anemia  Assessment & Plan  · Continue ferrous sulfate m,w,f  · Hemoglobin 11 7      Anxiety  Assessment & Plan  · Outpatient P r n  Ativan, held  ·     Hypercalcemia  Assessment & Plan  · Corrected calcium 13 on admission  · Elevated alkaline phosphate likely due to possible metastasis  · Renal masses seen on CT      VTE Pharmacologic Prophylaxis: VTE Score: 6 High Risk (Score >/= 5) - Pharmacological DVT Prophylaxis Ordered: enoxaparin (Lovenox)  Sequential Compression Devices Ordered    Code Status: Level 3 - DNAR and DNI   Discussion with family: Patient declined call to   Anticipated Length of Stay: Patient will be admitted on an inpatient basis with an anticipated length of stay of greater than 2 midnights secondary to Hypokalemia, cystitis without hematuria  Total Time for Visit, including Counseling / Coordination of Care: 45 minutes Greater than 50% of this total time spent on direct patient counseling and coordination of care  Chief Complaint:  Of abdominal pain, distension abnormal labs    History of Present Illness:  Daphne Crews is a 80 y o  female with a PMH of seizures, ataxia, HTN who presents from nursing home with generalized abdominal pain and distension along with abnormal lab values  Patient unsure of time last bowel movement, typically has 1 daily but complains of constipation  Denies fever, diarrhea, chest pain shortness of breath  Review of Systems:  Review of Systems   Constitutional: Negative for chills, fatigue and fever  Respiratory: Negative for chest tightness and shortness of breath  Cardiovascular: Negative for chest pain and palpitations  Gastrointestinal: Positive for constipation  Negative for diarrhea and nausea  Genitourinary: Negative for difficulty urinating  Neurological: Negative for dizziness and light-headedness  Psychiatric/Behavioral: Negative for confusion  Past Medical and Surgical History:   Past Medical History:   Diagnosis Date    Arthritis     Ataxia     Hypertension     Seizure [facility])        Past Surgical History:   Procedure Laterality Date    ANKLE SURGERY Left 2019    TONSILLECTOMY         Meds/Allergies:  Prior to Admission medications    Medication Sig Start Date End Date Taking?  Authorizing Provider   glucosamine-chondroitin 500-400 MG tablet Take 1 tablet by mouth 2 (two) times a day   Yes Historical Provider, MD   lisinopril (ZESTRIL) 20 mg tablet Take 20 mg by mouth daily   Yes Historical Provider, MD omeprazole (PriLOSEC) 20 mg delayed release capsule Take 20 mg by mouth daily   Yes Historical Provider, MD   bisacodyl (Dulcolax) 10 mg suppository Insert 10 mg into the rectum if needed for constipation    Historical Provider, MD   ferrous sulfate 325 (65 Fe) mg tablet Take 1 tablet (325 mg total) by mouth 3 (three) times a week  Patient taking differently: Take 325 mg by mouth 3 (three) times a week M,W,F  2/11/22 3/13/22  Nicholas Juan DO   levETIRAcetam (KEPPRA) 500 mg tablet Take 500 mg by mouth 2 (two) times a day      Historical Provider, MD   LORazepam (ATIVAN) 0 5 mg tablet Take 0 5 mg by mouth every 8 (eight) hours as needed for anxiety  Patient not taking: Reported on 3/17/2022     Historical Provider, MD   pantoprazole (PROTONIX) 40 mg tablet Take 1 tablet (40 mg total) by mouth daily in the early morning 2/12/22 3/14/22  Nicholas Juan DO   senna-docusate sodium (SENOKOT S) 8 6-50 mg per tablet Take 2 tablets by mouth daily at bedtime 2/11/22 3/13/22  Nicholas Juan DO   traMADol (ULTRAM) 50 mg tablet Take 50 mg by mouth every 6 (six) hours as needed for moderate pain    Historical Provider, MD     I have reveiwed home medications using records provided by [facility]  Allergies: No Known Allergies    Social History:  Marital Status: /Civil Union   Occupation:   Patient Pre-hospital Living Situation: [facility]: -  Patient Pre-hospital Level of Mobility: unable to be assessed at time of evaluation  Patient Pre-hospital Diet Restrictions:   Substance Use History:   Social History     Substance and Sexual Activity   Alcohol Use Never     Social History     Tobacco Use   Smoking Status Never Smoker   Smokeless Tobacco Never Used     Social History     Substance and Sexual Activity   Drug Use Never       Family History:  History reviewed  No pertinent family history      Physical Exam:     Vitals:   Blood Pressure: 135/62 (03/17/22 2100)  Pulse: 77 (03/17/22 2100)  Temperature: (!) 97 1 °F (36 2 °C) (03/17/22 1838)  Temp Source: Temporal (03/17/22 1838)  Respirations: 16 (03/17/22 2100)  Height: 5' 3" (160 cm) (03/17/22 1838)  Weight - Scale: 69 5 kg (153 lb 3 5 oz) (03/17/22 1838)  SpO2: 97 % (03/17/22 2100)    Physical Exam  Vitals and nursing note reviewed  Constitutional:       General: She is not in acute distress  Appearance: She is well-developed  HENT:      Head: Normocephalic and atraumatic  Eyes:      General: No scleral icterus  Conjunctiva/sclera: Conjunctivae normal    Cardiovascular:      Rate and Rhythm: Normal rate and regular rhythm  Heart sounds: No murmur heard  Pulmonary:      Effort: Pulmonary effort is normal  No respiratory distress  Breath sounds: Normal breath sounds  No wheezing, rhonchi or rales  Abdominal:      General: Bowel sounds are decreased  There is distension  Palpations: Abdomen is soft  Tenderness: There is abdominal tenderness  There is guarding  Musculoskeletal:      Cervical back: Neck supple  Skin:     General: Skin is warm and dry  Capillary Refill: Capillary refill takes less than 2 seconds  Neurological:      Mental Status: She is lethargic and disoriented  Cranial Nerves: Cranial nerve deficit: Generalized  Motor: Weakness present        Comments: Disoriented to place, time         Additional Data:     Lab Results:  Results from last 7 days   Lab Units 03/17/22  1821   WBC Thousand/uL 10 31*   HEMOGLOBIN g/dL 11 7   HEMATOCRIT % 37 4   PLATELETS Thousands/uL 376   NEUTROS PCT % 70   LYMPHS PCT % 17   MONOS PCT % 11   EOS PCT % 1     Results from last 7 days   Lab Units 03/17/22  1821   SODIUM mmol/L 133*   POTASSIUM mmol/L 2 8*   CHLORIDE mmol/L 98*   CO2 mmol/L 31   BUN mg/dL 15   CREATININE mg/dL 1 00   ANION GAP mmol/L 4   CALCIUM mg/dL 11 3*   ALBUMIN g/dL 1 8*   TOTAL BILIRUBIN mg/dL 0 56   ALK PHOS U/L 125*   ALT U/L 16   AST U/L 20   GLUCOSE RANDOM mg/dL 112     Results from last 7 days Lab Units 03/17/22  1821   INR  1 13             Results from last 7 days   Lab Units 03/17/22  1821   LACTIC ACID mmol/L 1 2       Imaging: Reviewed radiology reports from this admission including: abdominal/pelvic CT  CT abdomen pelvis with contrast   Final Result by Jenney Castleman, MD (03/17 1951)   1  Heterogeneously enhancing left lower renal pole 4 2 x 2 1 cm mass concerning for renal cell carcinoma until disproven with associated left renal vein thrombosis  Moderate right pleural effusion with nodular pleural foci (also nodules on the left),    right lateral abdominal wall adjacent to the liver nodules and masses, left paraspinal soft tissue heterogeneously enhancing nodule, all concerning for metastatic disease   2  No acute findings  The study was marked in EPIC for significant notification  Workstation performed: [id number]             EKG and Other Studies Reviewed on Admission:   · EKG: NSR  HR -     ** Please Note: This note has been constructed using a voice recognition system   **

## 2022-03-18 NOTE — PLAN OF CARE
Problem: PAIN - ADULT  Goal: Verbalizes/displays adequate comfort level or baseline comfort level  Description: Interventions:  - Encourage patient to monitor pain and request assistance  - Assess pain using appropriate pain scale  - Administer analgesics based on type and severity of pain and evaluate response  - Implement non-pharmacological measures as appropriate and evaluate response  - Consider cultural and social influences on pain and pain management  - Notify physician/advanced practitioner if interventions unsuccessful or patient reports new pain  Outcome: Progressing     Problem: SAFETY ADULT  Goal: Patient will remain free of falls  Description: INTERVENTIONS:  - Educate patient/family on patient safety including physical limitations  - Instruct patient to call for assistance with activity   - Consult OT/PT to assist with strengthening/mobility   - Keep Call bell within reach  - Keep bed low and locked with side rails adjusted as appropriate  - Keep care items and personal belongings within reach  - Initiate and maintain comfort rounds  - Make Fall Risk Sign visible to staff  - Offer Toileting every 4 Hours, in advance of need  - Initiate/Maintain bed alarm    - Apply yellow socks and bracelet for high fall risk patients  - Consider moving patient to room near nurses station  Outcome: Progressing     Problem: DISCHARGE PLANNING  Goal: Discharge to home or other facility with appropriate resources  Description: INTERVENTIONS:  - Identify barriers to discharge w/patient and caregiver  - Arrange for needed discharge resources and transportation as appropriate  - Identify discharge learning needs (meds, wound care, etc )  - Arrange for interpretive services to assist at discharge as needed  - Refer to Case Management Department for coordinating discharge planning if the patient needs post-hospital services based on physician/advanced practitioner order or complex needs related to functional status, cognitive ability, or social support system  Outcome: Progressing

## 2022-03-19 LAB
BACTERIA UR CULT: ABNORMAL
FLUAV RNA RESP QL NAA+PROBE: NEGATIVE
FLUBV RNA RESP QL NAA+PROBE: NEGATIVE
RSV RNA RESP QL NAA+PROBE: NEGATIVE
SARS-COV-2 RNA RESP QL NAA+PROBE: NEGATIVE

## 2022-03-19 PROCEDURE — 0241U HB NFCT DS VIR RESP RNA 4 TRGT: CPT | Performed by: FAMILY MEDICINE

## 2022-03-19 PROCEDURE — 99233 SBSQ HOSP IP/OBS HIGH 50: CPT | Performed by: FAMILY MEDICINE

## 2022-03-19 RX ADMIN — PANTOPRAZOLE SODIUM 40 MG: 40 TABLET, DELAYED RELEASE ORAL at 05:15

## 2022-03-19 RX ADMIN — LISINOPRIL 20 MG: 10 TABLET ORAL at 08:42

## 2022-03-19 RX ADMIN — CEFTRIAXONE 1000 MG: 1 INJECTION, SOLUTION INTRAVENOUS at 21:36

## 2022-03-19 RX ADMIN — SODIUM CHLORIDE 125 ML/HR: 9 INJECTION, SOLUTION INTRAVENOUS at 08:51

## 2022-03-19 RX ADMIN — DOCUSATE SODIUM AND SENNOSIDES 2 TABLET: 8.6; 5 TABLET ORAL at 21:36

## 2022-03-19 RX ADMIN — ENOXAPARIN SODIUM 40 MG: 40 INJECTION SUBCUTANEOUS at 08:41

## 2022-03-19 RX ADMIN — SODIUM CHLORIDE 125 ML/HR: 9 INJECTION, SOLUTION INTRAVENOUS at 17:46

## 2022-03-19 RX ADMIN — LIDOCAINE 1 PATCH: 50 PATCH TOPICAL at 14:36

## 2022-03-19 RX ADMIN — OXYCODONE HYDROCHLORIDE AND ACETAMINOPHEN 1 TABLET: 5; 325 TABLET ORAL at 17:43

## 2022-03-19 RX ADMIN — SODIUM CHLORIDE 125 ML/HR: 9 INJECTION, SOLUTION INTRAVENOUS at 00:40

## 2022-03-19 RX ADMIN — LEVETIRACETAM 500 MG: 500 TABLET, FILM COATED ORAL at 08:42

## 2022-03-19 RX ADMIN — LEVETIRACETAM 500 MG: 500 TABLET, FILM COATED ORAL at 21:36

## 2022-03-19 NOTE — ASSESSMENT & PLAN NOTE
· Continue ferrous sulfate m,w,f  · Hemoglobin 11 7  · As per imaging, suspected patient has renal cell carcinoma with metastasis unless otherwise proven    Could be the cause of anemia

## 2022-03-19 NOTE — CASE MANAGEMENT
Case Management Discharge Planning Note    Patient name Florecita Bowers  Location /-84 MRN [id number]  : 1932 Date 3/19/2022       Current Admission Date: 3/17/2022  Current Admission Diagnosis:Generalized abdominal pain   Patient Active Problem List    Diagnosis Date Noted    Renal cell carcinoma of left kidney ([email or web address] ) 2022    Chronic midline low back pain with bilateral sciatica 2022    Generalized abdominal pain 2022    Spinal stenosis 2022    Ataxia 2022    Seizure disorder ([email or web address] ) 2022    Iron deficiency anemia 2022    Anxiety 2022    Hypercalcemia 2022    Essential hypertension     Arthritis     Ambulatory dysfunction     Acute cystitis without hematuria       LOS (days): 2  Geometric Mean LOS (GMLOS) (days): 3 30  Days to GMLOS:1 6     OBJECTIVE:  Risk of Unplanned Readmission Score: 18         Current admission status: Inpatient   Preferred Pharmacy:   [address] [place] [place] [address] [address]  Phone: [phone] Fax: [phone]    Primary Care Provider: Justo Bello MD    Primary Insurance: Timi Peabody [facility] REP  Secondary Insurance:     DISCHARGE DETAILS:      patient discussed in huddle, [place] [facility]   CM checked on financial aspect of comfort care at [facility], message received they spoke with son all is good  Able to accept patient tomorrow 3/20/22  Medical Necessity for transportation was completed  Copy available for transport team and  hard copy in [place]  Referral placed with [facility]  [place] [facility]   Approximate  at 1000 am     Patient to have covid test prior to leaving /facility request no hx covid noted

## 2022-03-19 NOTE — CASE MANAGEMENT
Case Management Discharge Planning Note    Patient name Damaso Uriostegui  Location /-48 MRN [id number]  : 1932 Date 3/19/2022       Current Admission Date: 3/17/2022  Current Admission Diagnosis:Generalized abdominal pain   Patient Active Problem List    Diagnosis Date Noted    Renal cell carcinoma of left kidney ([facility] ) 2022    Chronic midline low back pain with bilateral sciatica 2022    Generalized abdominal pain 2022    Spinal stenosis 2022    Ataxia 2022    Seizure disorder ([facility] ) 2022    Iron deficiency anemia 2022    Anxiety 2022    Hypercalcemia 2022    Essential hypertension     Arthritis     Ambulatory dysfunction     Acute cystitis without hematuria       LOS (days): 2  Geometric Mean LOS (GMLOS) (days): 3 30  Days to GMLOS:1 5     OBJECTIVE:  Risk of Unplanned Readmission Score: 18         Current admission status: Inpatient   Preferred Pharmacy:   [address] [place] [address] Kobe Samano [address] [place] [address]  Phone: [phone] Fax: [phone]    Primary Care Provider: Anjum Lambert MD    Primary Insurance: [employer] [facility] REP  Secondary Insurance:     DISCHARGE DETAILS:     [facility] confirmed  Transport  time 1000 with [place]  [facility]  3/20/22 to [facility]

## 2022-03-19 NOTE — UTILIZATION REVIEW
Initial Clinical Review    Admission: Date/Time/Statement:   Admission Orders (From admission, onward)     Ordered        03/17/22 2030  INPATIENT ADMISSION  Once                      Orders Placed This Encounter   Procedures    INPATIENT ADMISSION     Standing Status:   Standing     Number of Occurrences:   1     Order Specific Question:   Level of Care     Answer:   Med Surg [16]     Order Specific Question:   Estimated length of stay     Answer:   More than 2 Midnights     Order Specific Question:   Certification     Answer:   I certify that inpatient services are medically necessary for this patient for a duration of greater than two midnights  See H&P and MD Progress Notes for additional information about the patient's course of treatment  ED Arrival Information     Expected Arrival Acuity    3/17/2022  3/17/2022 18:08 Urgent         Means of arrival Escorted by Service Admission type    Ambulance [facility] Urgent         Arrival complaint    abd pain        Chief Complaint   Patient presents with    Abdominal Pain       Initial Presentation: 81 yo female to ED from home w/ generalized abd pain and distention w/ abd lab values  Unsure of last BM , c/o constipation   CT abdomen renal mass 4 2 x 2 1 cm concerning for renal carcinoma with left renal vein thrombosis, moderate right pleural effusion with nodular pleural foci, right lateral abdominal wall adjacent to the liver nodules and masses, left paraspinal soft tissue heterogeneously enhancing nodule, all concerning for metastatic disease  Admitted IP status for generalized abd pain , start bowel regimen , pain control   + acute cystitis w/o hematuria start rocephin and IVF and monitor   PMHX HTN , seizures , anxiety and Fe def anemia   Cont lisinopril , keppra, and  ferrous sulfate   Hypercalcemia Mariano 13 w/ elevated alk phos likely d/t possible mets , renal masses on CT        PE: abd distention , tenderness, guarding , lethargic , disoriented , generalized weakness  Disoriented to place/time  Date:  3/18  Day 2: pt c/o low back pain   Plan is to focus on comfort care , CM following   Cont pain mngt   + abd tenderness    Cont IV abx for acute cystitis     ED Triage Vitals   Temperature Pulse Respirations Blood Pressure SpO2   03/17/22 1812 03/17/22 1812 03/17/22 1838 03/17/22 1812 03/17/22 1812   97 9 °F (36 6 °C) 91 16 162/74 91 %      Temp Source Heart Rate Source Patient Position - Orthostatic VS BP Location FiO2 (%)   03/17/22 1812 03/17/22 1812 03/17/22 1812 03/17/22 1812 --   Temporal Monitor Lying Left arm       Pain Score       03/17/22 1812       No Pain          Wt Readings from Last 1 Encounters:   03/17/22 69 5 kg (153 lb 3 5 oz)     Additional Vital Signs:   03/19/22 0805 98 6 °F (37 °C) 87 16 164/80 108 -- -- -- None (Room air) Lying   03/18/22 22:26:37 97 9 °F (36 6 °C) 83 -- 153/74 100 98 % -- -- -- --   03/18/22 22:26:27 97 9 °F (36 6 °C) -- -- 153/74 100 -- -- -- -- --   03/18/22 1951 -- -- -- -- -- 96 % 28 2 L/min Nasal cannula --   03/18/22 1600 -- -- -- -- -- -- 28 2 L/min Nasal cannula --   03/18/22 15:59:46 96 9 °F (36 1 °C) Abnormal  87 -- 153/74 100 96 % -- -- -- --   03/18/22 1500 -- 91 16 172/70 Abnormal   101 98 % 28 2 L/min Nasal cannula Lying   03/18/22 1430 -- 91 16 -- -- 98 % -- -- -- --   03/18/22 1200 -- 76 16 147/66 95 97 % 28 2 L/min Nasal cannula Lying   03/18/22 0810 -- -- -- -- -- -- -- -- Nasal cannula --   03/18/22 0800 -- 74 16 132/60 87 94 % 28 2 L/min Nasal cannula Lying   03/18/22 0700 -- 79 16 171/74 Abnormal  106 96 % 28 2 L/min Nasal cannula Lying   03/18/22 0400 -- 73 17 135/62 -- 96 % 28 2 L/min Nasal cannula Lying   03/18/22 0000 -- 76 -- 168/70 100 97 % -- -- -- --   03/17/22 2330 -- 70 -- -- -- 97 % -- -- -- --   03/17/22 2300 -- 76 -- 147/68 98 96 % -- -- -- --   03/17/22 2230 -- 71 -- 120/55 83 97 % -- -- -- --   03/17/22 2100 -- 77 16 135/62 89 97 % -- -- -- --   03/17/22 2045 -- 84 -- 151/70 100 98 % -- -- -- --   03/17/22 2000 -- 79 -- 130/61 88 98 % -- -- -- --   03/17/22 1945 -- 84 -- 144/65 94 97 % -- -- -- --   03/17/22 1930 -- 87 -- 145/67 96 97 % -- -- -- --   03/17/22 1845 -- 85 -- 147/63 98 95 % -- -- -- --   03/17/22 1840 -- -- -- -- -- -- 28 2 L/min Nasal cannula --   03/17/22 1838 97 1 °F (36 2 °C) Abnormal  88 16 152/72 -- 90 % --          Pertinent Labs/Diagnostic Test Results:   3/17 EKG Normal sinus rhythm  Nonspecific T wave abnormality  Abnormal ECG  CT abdomen pelvis with contrast   Final Result by Sabino Foster MD (03/17 1951)   1  Heterogeneously enhancing left lower renal pole 4 2 x 2 1 cm mass concerning for renal cell carcinoma until disproven with associated left renal vein thrombosis  Moderate right pleural effusion with nodular pleural foci (also nodules on the left),    right lateral abdominal wall adjacent to the liver nodules and masses, left paraspinal soft tissue heterogeneously enhancing nodule, all concerning for metastatic disease   2  No acute findings  The study was marked in EPIC for significant notification        Workstation performed: [id number]           Results from last 7 days   Lab Units 03/18/22  0609 03/18/22  0021 03/17/22  1821   WBC Thousand/uL 8 77  --  10 31*   HEMOGLOBIN g/dL 11 1*  --  11 7   HEMATOCRIT % 35 4  --  37 4   PLATELETS Thousands/uL 367 352 376   NEUTROS ABS Thousands/µL  --   --  7 29     Results from last 7 days   Lab Units 03/18/22  0609 03/18/22  0021 03/17/22  1821   SODIUM mmol/L 133* 133* 133*   POTASSIUM mmol/L 3 8 2 9* 2 8*   CHLORIDE mmol/L 101 99* 98*   CO2 mmol/L 27 31 31   ANION GAP mmol/L 5 3* 4   BUN mg/dL 12 13 15   CREATININE mg/dL 0 80 0 89 1 00   EGFR ml/min/1 73sq m 65 57 49   CALCIUM mg/dL 11 0* 10 7* 11 3*     Results from last 7 days   Lab Units 03/18/22  0609 03/17/22  1821   AST U/L 18 20   ALT U/L 6* 16   ALK PHOS U/L 109 125*   TOTAL PROTEIN g/dL 6 3* 6 7   ALBUMIN g/dL 1 6* 1 8*   TOTAL BILIRUBIN mg/dL 0 53 0 56     Results from last 7 days   Lab Units 03/18/22  0609 03/18/22  0021 03/17/22  1821   GLUCOSE RANDOM mg/dL 98 110 112     Results from last 7 days   Lab Units 03/17/22  1821   PROTIME seconds 14 4   INR  1 13   PTT seconds 33     Results from last 7 days   Lab Units 03/17/22  1821   LACTIC ACID mmol/L 1 2     Results from last 7 days   Lab Units 03/17/22  1821   LIPASE u/L 49*     Results from last 7 days   Lab Units 03/17/22  1924   CLARITY UA  Slightly Cloudy   COLOR UA  Yellow   SPEC GRAV UA  1 020   PH UA  6 0   GLUCOSE UA mg/dl Negative   KETONES UA mg/dl Negative   BLOOD UA  Trace-lysed*   PROTEIN UA mg/dl 30 (1+)*   NITRITE UA  Negative   BILIRUBIN UA  Small*   UROBILINOGEN UA E U /dl 1 0   LEUKOCYTES UA  Small*   WBC UA /hpf 10-20*   RBC UA /hpf 1-2   BACTERIA UA /hpf Moderate*   EPITHELIAL CELLS WET PREP /hpf Moderate*   MUCUS THREADS  Occasional*     Results from last 7 days   Lab Units 03/17/22  1924   URINE CULTURE  >100,000 cfu/ml Aerococcus urinae*       ED Treatment:   Medication Administration from 03/17/2022 1807 to 03/18/2022 1544       Date/Time Order Dose Route Action     03/17/2022 1921 sodium chloride 0 9 % bolus 1,000 mL 1,000 mL Intravenous New Bag     03/17/2022 1919 morphine injection 2 mg 2 mg Intravenous Given     03/17/2022 1930 potassium chloride 20 mEq IVPB (premix) 20 mEq Intravenous New Bag     03/18/2022 0358 sodium chloride 0 9 % infusion 125 mL/hr Intravenous New Bag     03/17/2022 2020 sodium chloride 0 9 % infusion 125 mL/hr Intravenous New Bag     03/18/2022 0814 ferrous sulfate tablet 325 mg 325 mg Oral Given     03/18/2022 0814 lisinopril (ZESTRIL) tablet 20 mg 20 mg Oral Given     03/18/2022 0610 pantoprazole (PROTONIX) EC tablet 40 mg 40 mg Oral Given     03/17/2022 2233 senna-docusate sodium (SENOKOT S) 8 6-50 mg per tablet 2 tablet 2 tablet Oral Given     03/18/2022 0814 enoxaparin (LOVENOX) subcutaneous injection 40 mg 40 mg Subcutaneous Given 03/17/2022 2232 cefTRIAXone (ROCEPHIN) IVPB (premix in dextrose) 1,000 mg 50 mL 1,000 mg Intravenous New Bag     03/18/2022 0814 levETIRAcetam (KEPPRA) tablet 500 mg 500 mg Oral Given     03/17/2022 2233 levETIRAcetam (KEPPRA) tablet 500 mg 500 mg Oral Given     03/18/2022 0356 potassium chloride 20 mEq IVPB (premix) 20 mEq Intravenous New Bag     03/18/2022 0610 potassium chloride 20 mEq IVPB (premix) 20 mEq Intravenous New Bag     03/18/2022 1534 oxyCODONE-acetaminophen (PERCOCET) 5-325 mg per tablet 1 tablet 1 tablet Oral Given        Past Medical History:   Diagnosis Date    Arthritis     Ataxia     Hypertension     Seizure ([facility] [address] )      Present on Admission:   Essential hypertension   Acute cystitis without hematuria   Seizure disorder (HCC)   Iron deficiency anemia   Anxiety   Hypercalcemia   Generalized abdominal pain   Renal cell carcinoma of left kidney (HCC)   Chronic midline low back pain with bilateral sciatica      Admitting Diagnosis: Hypercalcemia [E83 52]  Hypokalemia [E87 6]  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]  Renal mass [N28 89]  Constipation [K59 00]  Unspecified abdominal pain [R10 9]  Age/Sex: 80 y o  female  Admission Orders:  Scheduled Medications:  cefTRIAXone, 1,000 mg, Intravenous, Q24H  enoxaparin, 40 mg, Subcutaneous, Daily  ferrous sulfate, 325 mg, Oral, Once per day on Mon Wed Fri  levETIRAcetam, 500 mg, Oral, BID  lidocaine, 1 patch, Topical, Q24H  lisinopril, 20 mg, Oral, Daily  pantoprazole, 40 mg, Oral, Early Morning  senna-docusate sodium, 2 tablet, Oral, HS      Continuous IV Infusions:  sodium chloride, 125 mL/hr, Intravenous, Continuous      PRN Meds:  acetaminophen, 650 mg, Oral, Q6H PRN  bisacodyl, 10 mg, Rectal, PRN  HYDROmorphone, 0 5 mg, Intravenous, Q4H PRN  oxyCODONE-acetaminophen, 1 tablet, Oral, Q4H PRN    OT PT eval       Network Utilization Review Department  ATTENTION: Please call with any questions or concerns to [phone] and carefully listen to the prompts so that you are directed to the right person  All voicemails are confidential   [facility] all requests for admission clinical reviews, approved or denied determinations and any other requests to dedicated fax number below belonging to the campus where the patient is receiving treatment   List of dedicated fax numbers for the Facilities:  [address] DENIALS (Administrative/Medical Necessity) [phone]   1000  [facility] (Maternity/NICU/Pediatrics) [phone] [address]  [address] [facility] [place] [address] [id number] [address] [place] [address] [facility] [phone]

## 2022-03-19 NOTE — PLAN OF CARE
Problem: PAIN - ADULT  Goal: Verbalizes/displays adequate comfort level or baseline comfort level  Description: Interventions:  - Encourage patient to monitor pain and request assistance  - Assess pain using appropriate pain scale  - Administer analgesics based on type and severity of pain and evaluate response  - Implement non-pharmacological measures as appropriate and evaluate response  - Consider cultural and social influences on pain and pain management  - Notify physician/advanced practitioner if interventions unsuccessful or patient reports new pain  Outcome: Progressing     Problem: DISCHARGE PLANNING - CARE MANAGEMENT  Goal: Discharge to post-acute care or home with appropriate resources  Description: INTERVENTIONS:  - Conduct assessment to determine patient/family and health care team treatment goals, and need for post-acute services based on payer coverage, community resources, and patient preferences, and barriers to discharge  - Address psychosocial, clinical, and financial barriers to discharge as identified in assessment in conjunction with the patient/family and health care team  - Arrange appropriate level of post-acute services according to patients   needs and preference and payer coverage in collaboration with the physician and health care team  - Communicate with and update the patient/family, physician, and health care team regarding progress on the discharge plan  - Arrange appropriate transportation to post-acute venues  Outcome: Progressing

## 2022-03-19 NOTE — PROGRESS NOTES
[address]  Progress Note - Arvind 2/6/1932, 80 y o  female MRN: [id number]  Unit/Bed#: -[facility] Encounter: [id number]  Primary Care Provider: Phuong Price MD   Date and time admitted to hospital: 3/17/2022  6:08 PM    * Generalized abdominal pain  Assessment & Plan  Presented from nursing home with abdominal pain, distension, abnormal labs  · CT abdomen renal mass 4 2 x 2 1 cm concerning for renal carcinoma with left renal vein thrombosis, moderate right pleural effusion with nodular pleural foci, right lateral abdominal wall adjacent to the liver nodules and masses, left paraspinal soft tissue heterogeneously enhancing nodule, all concerning for metastatic disease  · Patient given point generalized abdominal pain constipation with unknown last BM  · Generalized abdominal pain palpitation, abdominal distension  · Outpatient x-ray moderate colonic ileus  · Start bowel regimen  · Care everywhere shows phone call for heme Onc set up the patient hospitalized  · Pain management medication adjusted  · Will plan for discharge in a m  Chronic midline low back pain with bilateral sciatica  Assessment & Plan  Radiating towards her lower extremities  Per chart review shows patient has history of spinal stenosis  Recent imaging shows suspected renal cell carcinoma with metastasis to liver, lungs and lumbar spine-which can also contribute to lower back pain and sciatica  Since patient has wish for DNR DNI and does not want to proceed with invasive procedure, and family member on bedside also agrees with patient wish, will focus on comfort care  With pain management  Pain medication adjusted    Continue other medication at this time    Renal cell carcinoma of left kidney [facility])  Assessment & Plan  Most likely based on imaging-since patient does not want to proceed any kind of invasive procedure  CT scan abdomen and pelvis shows: Left lower renal pole heterogeneous mass consistent with renal cell carcinoma until disproven measuring up to 6 4 x 6 5 cm   Thrombosis left renal vein  As per imaging suspect metastasis to liver, lungs and spine  Discuss with imaging findings with patient's family, son on bedside in details  Patient has living will which shows DNR DNI and patient does not want to proceed with any kind of invasive procedure  Just focus on comfort care  Case management on board  Continue other medication at this time    Hypercalcemia  Assessment & Plan  · Most likely secondary to underlying malignancy  · Corrected calcium 13 on admission  · Elevated alkaline phosphate likely due to possible metastasis  · Renal masses seen on CT    Anxiety  Assessment & Plan  · Outpatient P r n  Ativan, held  ·     Iron deficiency anemia  Assessment & Plan  · Continue ferrous sulfate m,w,f  · Hemoglobin 11 7  · As per imaging, suspected patient has renal cell carcinoma with metastasis unless otherwise proven  Could be the cause of anemia      Seizure disorder (HCC)  Assessment & Plan  · Continue home Keppra  · Unknown time of last seizure    Acute cystitis without hematuria  Assessment & Plan  · UA, leukocytes, moderate bacteria- start 1 g Rocephin daily  · Leukocytosis 10 admission  · Afebrile   · Start IV fluids normal saline for mild hyponatremia likely dehydration  · Lactate 1 2    Essential hypertension  Assessment & Plan  · Continue lisinopril          VTE Pharmacologic Prophylaxis: VTE Score: 6 High Risk (Score >/= 5) - Pharmacological DVT Prophylaxis Ordered: enoxaparin (Lovenox)  Sequential Compression Devices Ordered  Patient Centered Rounds: I performed bedside rounds with nursing staff today  Discussions with Specialists or Other Care Team Provider:  None    Education and Discussions with Family / Patient: Updated  (son) via phone  Time Spent for Care: 20 minutes   More than 50% of total time spent on counseling and coordination of care as described above     Current Length of Stay: 2 day(s)  Current Patient Status: Inpatient   Certification Statement: The patient will continue to require additional inpatient hospital stay due to To monitor above condition  Discharge Plan: Anticipate discharge tomorrow to rehab facility  Code Status: Level 3 - DNAR and DNI    Subjective:   Seen and evaluated during the round  Patient is still complaining of pain on right lower and upper quadrant  Also complaining of lower back pain  Objective:     Vitals:   Temp (24hrs), Av 8 °F (36 6 °C), Min:96 9 °F (36 1 °C), Max:98 6 °F (37 °C)    Temp:  [96 9 °F (36 1 °C)-98 6 °F (37 °C)] 98 6 °F (37 °C)  HR:  [83-91] 87  Resp:  [16] 16  BP: (153-172)/(70-80) 164/80  SpO2:  [96 %-98 %] 98 %  Body mass index is 27 14 kg/m²  Input and Output Summary (last 24 hours): Intake/Output Summary (Last 24 hours) at 3/19/2022 1343  Last data filed at 3/19/2022 0409  Gross per 24 hour   Intake 3541 67 ml   Output 1275 ml   Net 2266 67 ml       Physical Exam:   Physical Exam  Vitals and nursing note reviewed  Constitutional:       Appearance: She is obese  HENT:      Nose: No congestion  Mouth/Throat:      Mouth: Mucous membranes are moist       Pharynx: No oropharyngeal exudate  Eyes:      General: No scleral icterus  Conjunctiva/sclera: Conjunctivae normal       Pupils: Pupils are equal, round, and reactive to light  Cardiovascular:      Rate and Rhythm: Normal rate  Heart sounds: No gallop  Pulmonary:      Effort: Pulmonary effort is normal  No respiratory distress  Breath sounds: No rhonchi  Abdominal:      General: Abdomen is flat  Bowel sounds are normal  There is no distension  Palpations: There is no mass  Tenderness: There is no abdominal tenderness  Hernia: No hernia is present  Musculoskeletal:         General: Normal range of motion  Cervical back: Normal range of motion  Right lower leg: No edema        Left lower leg: No edema  Lymphadenopathy:      Cervical: No cervical adenopathy  Skin:     General: Skin is warm  Capillary Refill: Capillary refill takes less than 2 seconds  Findings: No lesion  Neurological:      General: No focal deficit present  Mental Status: She is alert and oriented to person, place, and time  Additional Data:     Labs:  Results from last 7 days   Lab Units 03/18/22  0609 03/18/22  0021 03/17/22  1821   WBC Thousand/uL 8 77  --  10 31*   HEMOGLOBIN g/dL 11 1*  --  11 7   HEMATOCRIT % 35 4  --  37 4   PLATELETS Thousands/uL 367   < > 376   NEUTROS PCT %  --   --  70   LYMPHS PCT %  --   --  17   MONOS PCT %  --   --  11   EOS PCT %  --   --  1    < > = values in this interval not displayed  Results from last 7 days   Lab Units 03/18/22  0609   SODIUM mmol/L 133*   POTASSIUM mmol/L 3 8   CHLORIDE mmol/L 101   CO2 mmol/L 27   BUN mg/dL 12   CREATININE mg/dL 0 80   ANION GAP mmol/L 5   CALCIUM mg/dL 11 0*   ALBUMIN g/dL 1 6*   TOTAL BILIRUBIN mg/dL 0 53   ALK PHOS U/L 109   ALT U/L 6*   AST U/L 18   GLUCOSE RANDOM mg/dL 98     Results from last 7 days   Lab Units 03/17/22  1821   INR  1 13             Results from last 7 days   Lab Units 03/17/22  1821   LACTIC ACID mmol/L 1 2       Lines/Drains:  Invasive Devices  Report    Peripheral Intravenous Line            Peripheral IV 03/17/22 Right Antecubital 1 day          Drain            Urethral Catheter 1 day              Urinary Catheter:  Goal for removal: Remove after 48 hrs of I/O monitoring               Imaging: No pertinent imaging reviewed      Recent Cultures (last 7 days):   Results from last 7 days   Lab Units 03/17/22  1924   URINE CULTURE  >100,000 cfu/ml Aerococcus urinae*       Last 24 Hours Medication List:   Current Facility-Administered Medications   Medication Dose Route Frequency Provider Last Rate    acetaminophen  650 mg Oral Q6H PRN Rakel Sailors, CRNP      bisacodyl  10 mg Rectal PRN Katt Aguilar PHIL Gong      cefTRIAXone  1,000 mg Intravenous Q24H Gayl Angelucci, CRNP 1,000 mg (03/18/22 2120)    enoxaparin  40 mg Subcutaneous Daily Gayl Angelucci, CRNP      ferrous sulfate  325 mg Oral Once per day on Mon Wed Fri Gayl Angelucci, CRNP      HYDROmorphone  0 5 mg Intravenous Q4H PRN Darryle Aldo, MD      levETIRAcetam  500 mg Oral BID Gayl Angelucci, CRNP      lidocaine  1 patch Topical Q24H Darryle Aldo, MD      lisinopril  20 mg Oral Daily Gayl Angelucci, CRNP      oxyCODONE-acetaminophen  1 tablet Oral Q4H PRN Darryle Aldo, MD      pantoprazole  40 mg Oral Early Morning Gayl Angelucci, CRNP      senna-docusate sodium  2 tablet Oral HS Gayl Angelucci, CRNP      sodium chloride  125 mL/hr Intravenous Continuous Twylla Saliva,  mL/hr (03/19/22 2200)        Today, Patient Was Seen By: Darryle Aldo, MD    **Please Note: This note may have been constructed using a voice recognition system  **

## 2022-03-19 NOTE — ASSESSMENT & PLAN NOTE
Presented from nursing home with abdominal pain, distension, abnormal labs  · CT abdomen renal mass 4 2 x 2 1 cm concerning for renal carcinoma with left renal vein thrombosis, moderate right pleural effusion with nodular pleural foci, right lateral abdominal wall adjacent to the liver nodules and masses, left paraspinal soft tissue heterogeneously enhancing nodule, all concerning for metastatic disease  · Patient given point generalized abdominal pain constipation with unknown last BM  · Generalized abdominal pain palpitation, abdominal distension  · Outpatient x-ray moderate colonic ileus  · Start bowel regimen  · Care everywhere shows phone call for heme Onc set up the patient hospitalized  · Pain management medication adjusted  · Will plan for discharge in a m

## 2022-03-20 ENCOUNTER — APPOINTMENT (INPATIENT)
Dept: RADIOLOGY | Facility: HOSPITAL | Age: 87
DRG: 686 | End: 2022-03-20
Payer: COMMERCIAL

## 2022-03-20 VITALS
SYSTOLIC BLOOD PRESSURE: 197 MMHG | RESPIRATION RATE: 17 BRPM | DIASTOLIC BLOOD PRESSURE: 94 MMHG | BODY MASS INDEX: 27.15 KG/M2 | WEIGHT: 153.22 LBS | TEMPERATURE: 97.9 F | OXYGEN SATURATION: 95 % | HEIGHT: 63 IN | HEART RATE: 87 BPM

## 2022-03-20 PROBLEM — R33.8 ACUTE RETENTION OF URINE: Status: ACTIVE | Noted: 2022-03-20

## 2022-03-20 PROBLEM — G93.41 ACUTE METABOLIC ENCEPHALOPATHY: Status: ACTIVE | Noted: 2022-03-20

## 2022-03-20 PROCEDURE — 74018 RADEX ABDOMEN 1 VIEW: CPT

## 2022-03-20 PROCEDURE — 99239 HOSP IP/OBS DSCHRG MGMT >30: CPT | Performed by: FAMILY MEDICINE

## 2022-03-20 RX ORDER — LORAZEPAM 0.5 MG/1
0.5 TABLET ORAL EVERY 8 HOURS PRN
Qty: 15 TABLET | Refills: 0 | Status: SHIPPED | OUTPATIENT
Start: 2022-03-20 | End: 2022-03-30

## 2022-03-20 RX ORDER — ACETAMINOPHEN 325 MG/1
650 TABLET ORAL EVERY 6 HOURS PRN
Qty: 30 TABLET | Refills: 0
Start: 2022-03-20

## 2022-03-20 RX ORDER — OXYCODONE HYDROCHLORIDE AND ACETAMINOPHEN 5; 325 MG/1; MG/1
1 TABLET ORAL EVERY 4 HOURS PRN
Qty: 15 TABLET | Refills: 0 | Status: SHIPPED | OUTPATIENT
Start: 2022-03-20 | End: 2022-03-30

## 2022-03-20 RX ORDER — CEPHALEXIN 500 MG/1
500 CAPSULE ORAL EVERY 8 HOURS SCHEDULED
Qty: 12 CAPSULE | Refills: 0
Start: 2022-03-20 | End: 2022-03-24

## 2022-03-20 RX ADMIN — SODIUM CHLORIDE 125 ML/HR: 9 INJECTION, SOLUTION INTRAVENOUS at 02:55

## 2022-03-20 RX ADMIN — ENOXAPARIN SODIUM 40 MG: 40 INJECTION SUBCUTANEOUS at 09:00

## 2022-03-20 RX ADMIN — LISINOPRIL 20 MG: 10 TABLET ORAL at 09:00

## 2022-03-20 RX ADMIN — LEVETIRACETAM 500 MG: 500 TABLET, FILM COATED ORAL at 09:00

## 2022-03-20 RX ADMIN — OXYCODONE HYDROCHLORIDE AND ACETAMINOPHEN 1 TABLET: 5; 325 TABLET ORAL at 13:16

## 2022-03-20 RX ADMIN — PANTOPRAZOLE SODIUM 40 MG: 40 TABLET, DELAYED RELEASE ORAL at 05:24

## 2022-03-20 RX ADMIN — OXYCODONE HYDROCHLORIDE AND ACETAMINOPHEN 1 TABLET: 5; 325 TABLET ORAL at 09:07

## 2022-03-20 NOTE — ASSESSMENT & PLAN NOTE
· Most likely secondary to underlying malignancy  · Corrected calcium 13 on admission  · Elevated alkaline phosphate likely due to possible metastasis  · Renal masses seen on CT    Patient  does not want to proceed with any invasive procedure or extensive measurement  Son verifies and confirm it  Patient and family wish to focus on comfort at this time  Patient will be discharged back to nursing home with focusing on comfort measurement  Can continue current medication-no invasive or extensive measurement needed

## 2022-03-20 NOTE — ASSESSMENT & PLAN NOTE
· UA, leukocytes, moderate bacteria- start 1 g Rocephin daily  · Leukocytosis 10 admission  · Afebrile   · Patient be discharged with p o  Keflex  Patient  does not want to proceed with any invasive procedure or extensive measurement  Son verifies and confirm it  Patient and family wish to focus on comfort at this time  Patient will be discharged back to nursing home with focusing on comfort measurement  Can continue current medication-no invasive or extensive measurement needed

## 2022-03-20 NOTE — CASE MANAGEMENT
Case Management Discharge Planning Note    Patient name Florecita Bowers  Location /-07 MRN [id number]  : 1932 Date 3/20/2022       Current Admission Date: 3/17/2022  Current Admission Diagnosis:Generalized abdominal pain   Patient Active Problem List    Diagnosis Date Noted    [facility] 2022    Chronic midline low back pain with bilateral sciatica 2022    Generalized abdominal pain 2022    Spinal stenosis 2022    Ataxia 2022    Seizure disorder ([facility] ) 2022    Iron deficiency anemia 2022    Anxiety 2022    Hypercalcemia 2022    Essential hypertension     Arthritis     Ambulatory dysfunction     Acute cystitis without hematuria       LOS (days): 3  Geometric Mean LOS (GMLOS) (days): 3 30  Days to GMLOS:0 7     OBJECTIVE:  Risk of Unplanned Readmission Score: 18         Current admission status: Inpatient   Preferred Pharmacy:   [address] [place] [address] [place] [address]  Phone: [phone] Fax: [phone]    Primary Care Provider: Justo Bello MD    Primary Insurance: [facility]  Secondary Insurance:     DISCHARGE DETAILS:    Patient discussed am huddle stable for discharge    CM was unaware of discharge today  CM spoke to patient son via phone , reviewed DC IMM with patient and informed that patient can stay an additional 4 hours for reconsidering appealing the discharge as the medicare rights were review on the day of discharge  Pt verbalized understanding and feels ready to go home and does not intend to stay 4 hours to reconsider     Son vertified [facility] at [facility]  [employer] called and transport time changed to  1400 via [place] [facility]  Confirmed     [facility] updated on ecin patient patient arrival today at 1400 via [facility] Final test neg 3/19/22  Faxed to [phone], [address] : [facility]  nsg and rehab  Receiving Facility/Agency Phone Number: [phone]  Facility/Agency Fax Number: [phone]

## 2022-03-20 NOTE — ASSESSMENT & PLAN NOTE
Presented from nursing home with abdominal pain, distension, abnormal labs  · CT abdomen renal mass 4 2 x 2 1 cm concerning for renal carcinoma with left renal vein thrombosis, moderate right pleural effusion with nodular pleural foci, right lateral abdominal wall adjacent to the liver nodules and masses, left paraspinal soft tissue heterogeneously enhancing nodule, all concerning for metastatic disease  · Suspected patient has renal cell carcinoma with metastasis to liver, lungs and spine could be the cause  · Patient is having bowel movement, tolerating p o  Ranjit Merle · Patient  does not want to proceed with any invasive procedure or extensive measurement  Son verifies and confirm it  Patient and family wish to focus on comfort at this time  Patient will be discharged back to nursing home with focusing on comfort measurement  Can continue current medication-no invasive or extensive measurement needed

## 2022-03-20 NOTE — ASSESSMENT & PLAN NOTE
· Outpatient P r n  Ativan, held  Patient  does not want to proceed with any invasive procedure or extensive measurement  Son verifies and confirm it  Patient and family wish to focus on comfort at this time  Patient will be discharged back to nursing home with focusing on comfort measurement  Can continue current medication-no invasive or extensive measurement needed

## 2022-03-20 NOTE — ASSESSMENT & PLAN NOTE
Radiating towards her lower extremities  Per chart review shows patient has history of spinal stenosis  Recent imaging shows suspected renal cell carcinoma with metastasis to liver, lungs and lumbar spine-which can also contribute to lower back pain and sciatica  Since patient has wish for DNR DNI and does not want to proceed with invasive procedure, and family member on bedside also agrees with patient wish, will focus on comfort care  With pain management  Pain medication adjusted  Continue other medication at this time  Patient  does not want to proceed with any invasive procedure or extensive measurement  Son verifies and confirm it  Patient and family wish to focus on comfort at this time  Patient will be discharged back to nursing home with focusing on comfort measurement  Can continue current medication-no invasive or extensive measurement needed

## 2022-03-20 NOTE — NURSING NOTE
MAN PULLED 3/19/22 @ 2000 AS PER PHYSICIAN ORDER  NO SPONTANEOUS VOIDS NOTED, BLADDER SCANNED  @ 0044  [address] [place] [address] Drive NOTIFIED  NEW ORDERS NOTED  STRAIGHT CATH PERFORMED BY PEPPER BLANCO RN  ML  PT TOLERATED WITHOUT DIFFICULTY  URINARY RETENTION PROTOCOL INITIATED

## 2022-03-20 NOTE — ASSESSMENT & PLAN NOTE
Most likely multifactorial-cystitis, medication side effect  On discharge date, patient is alert and oriented and answering question appropriately    Due to patient's other comorbid conditions, and since patient does not want to proceed with any invasive procedure or extensive measurement, patient be discharged back to nursing home with focusing on comfort mainly, may continue other medication at this time

## 2022-03-20 NOTE — PLAN OF CARE
Problem: Potential for Falls  Goal: Patient will remain free of falls  Description: INTERVENTIONS:  - Educate patient/family on patient safety including physical limitations  - Instruct patient to call for assistance with activity   - Consult OT/PT to assist with strengthening/mobility   - Keep Call bell within reach  - Keep bed low and locked with side rails adjusted as appropriate  - Keep care items and personal belongings within reach  - Initiate and maintain comfort rounds  - Make Fall Risk Sign visible to staff  - Offer Toileting every 2 Hours, in advance of need    - Apply yellow socks and bracelet for high fall risk patients  - Consider moving patient to room near nurses station  Outcome: Progressing     Problem: MOBILITY - ADULT  Goal: Maintain or return to baseline ADL function  Description: INTERVENTIONS:  -  Assess patient's ability to carry out ADLs; assess patient's baseline for ADL function and identify physical deficits which impact ability to perform ADLs (bathing, care of mouth/teeth, toileting, grooming, dressing, etc )  - Assess/evaluate cause of self-care deficits   - Assess range of motion  - Assess patient's mobility; develop plan if impaired  - Assess patient's need for assistive devices and provide as appropriate  - Encourage maximum independence but intervene and supervise when necessary  - Involve family in performance of ADLs  - Assess for home care needs following discharge   - Consider OT consult to assist with ADL evaluation and planning for discharge  - Provide patient education as appropriate  Outcome: Progressing  Goal: Maintains/Returns to pre admission functional level  Description: INTERVENTIONS:  - Perform BMAT or MOVE assessment daily    - Set and communicate daily mobility goal to care team and patient/family/caregiver  - Collaborate with rehabilitation services on mobility goals if consulted  - Perform Range of Motion 4 times a day  - Reposition patient every 2 hours    - Dangle patient   3 times a day  - Stand patient 3 times a day  - Ambulate patient 3 times a day  - Out of bed to chair 3 times a day   - Out of bed for meals 3 times a day  - Out of bed for toileting  - Record patient progress and toleration of activity level   Outcome: Progressing     Problem: PAIN - ADULT  Goal: Verbalizes/displays adequate comfort level or baseline comfort level  Description: Interventions:  - Encourage patient to monitor pain and request assistance  - Assess pain using appropriate pain scale  - Administer analgesics based on type and severity of pain and evaluate response  - Implement non-pharmacological measures as appropriate and evaluate response  - Consider cultural and social influences on pain and pain management  - Notify physician/advanced practitioner if interventions unsuccessful or patient reports new pain  Outcome: Progressing     Problem: INFECTION - ADULT  Goal: Absence or prevention of progression during hospitalization  Description: INTERVENTIONS:  - Assess and monitor for signs and symptoms of infection  - Monitor lab/diagnostic results  - Monitor all insertion sites, i e  indwelling lines, tubes, and drains  - Monitor endotracheal if appropriate and nasal secretions for changes in amount and color  - [place] appropriate cooling/warming therapies per order  - Administer medications as ordered  - Instruct and encourage patient and family to use good hand hygiene technique  - Identify and instruct in appropriate isolation precautions for identified infection/condition  Outcome: Progressing  Goal: Absence of fever/infection during neutropenic period  Description: INTERVENTIONS:  - Monitor WBC    Outcome: Progressing     Problem: SAFETY ADULT  Goal: Patient will remain free of falls  Description: INTERVENTIONS:  - Educate patient/family on patient safety including physical limitations  - Instruct patient to call for assistance with activity   - Consult OT/PT to assist with strengthening/mobility   - Keep Call bell within reach  - Keep bed low and locked with side rails adjusted as appropriate  - Keep care items and personal belongings within reach  - Initiate and maintain comfort rounds  - Make Fall Risk Sign visible to staff  - Offer Toileting every 2 Hours, in advance of need  - Apply yellow socks and bracelet for high fall risk patients  - Consider moving patient to room near nurses station  Outcome: Progressing  Goal: Maintain or return to baseline ADL function  Description: INTERVENTIONS:  -  Assess patient's ability to carry out ADLs; assess patient's baseline for ADL function and identify physical deficits which impact ability to perform ADLs (bathing, care of mouth/teeth, toileting, grooming, dressing, etc )  - Assess/evaluate cause of self-care deficits   - Assess range of motion  - Assess patient's mobility; develop plan if impaired  - Assess patient's need for assistive devices and provide as appropriate  - Encourage maximum independence but intervene and supervise when necessary  - Involve family in performance of ADLs  - Assess for home care needs following discharge   - Consider OT consult to assist with ADL evaluation and planning for discharge  - Provide patient education as appropriate  Outcome: Progressing  Goal: Maintains/Returns to pre admission functional level  Description: INTERVENTIONS:  - Perform BMAT or MOVE assessment daily    - Set and communicate daily mobility goal to care team and patient/family/caregiver  - Collaborate with rehabilitation services on mobility goals if consulted  - Perform Range of Motion 4 times a day  - Reposition patient every 2 hours    - Dangle patient 3 times a day  - Stand patient 3 times a day  - Ambulate patient 3 times a day  - Out of bed to chair 3 times a day   - Out of bed for meals 3 times a day  - Out of bed for toileting  - Record patient progress and toleration of activity level   Outcome: Progressing     Problem: DISCHARGE PLANNING  Goal: Discharge to home or other facility with appropriate resources  Description: INTERVENTIONS:  - Identify barriers to discharge w/patient and caregiver  - Arrange for needed discharge resources and transportation as appropriate  - Identify discharge learning needs (meds, wound care, etc )  - Arrange for interpretive services to assist at discharge as needed  - Refer to Case Management Department for coordinating discharge planning if the patient needs post-hospital services based on physician/advanced practitioner order or complex needs related to functional status, cognitive ability, or social support system  Outcome: Progressing     Problem: Knowledge Deficit  Goal: Patient/family/caregiver demonstrates understanding of disease process, treatment plan, medications, and discharge instructions  Description: Complete learning assessment and assess knowledge base    Interventions:  - Provide teaching at level of understanding  - Provide teaching via preferred learning methods  Outcome: Progressing     Problem: DISCHARGE PLANNING - CARE MANAGEMENT  Goal: Discharge to post-acute care or home with appropriate resources  Description: INTERVENTIONS:  - Conduct assessment to determine patient/family and health care team treatment goals, and need for post-acute services based on payer coverage, community resources, and patient preferences, and barriers to discharge  - Address psychosocial, clinical, and financial barriers to discharge as identified in assessment in conjunction with the patient/family and health care team  - Arrange appropriate level of post-acute services according to patients   needs and preference and payer coverage in collaboration with the physician and health care team  - Communicate with and update the patient/family, physician, and health care team regarding progress on the discharge plan  - Arrange appropriate transportation to post-acute venues  Outcome: Progressing Problem: Prexisting or High Potential for Compromised Skin Integrity  Goal: Skin integrity is maintained or improved  Description: INTERVENTIONS:  - Identify patients at risk for skin breakdown  - Assess and monitor skin integrity  - Assess and monitor nutrition and hydration status  - Monitor labs   - Assess for incontinence   - Turn and reposition patient  - Assist with mobility/ambulation  - Relieve pressure over bony prominences  - Avoid friction and shearing  - Provide appropriate hygiene as needed including keeping skin clean and dry  - Evaluate need for skin moisturizer/barrier cream  - Collaborate with interdisciplinary team   - Patient/family teaching  - Consider wound care consult   Outcome: Progressing

## 2022-03-20 NOTE — ASSESSMENT & PLAN NOTE
Status post [place] in place-patient will be discharged with [place]    Patient  does not want to proceed with any invasive procedure or extensive measurement  Son verifies and confirm it  Patient and family wish to focus on comfort at this time  Patient will be discharged back to nursing home with focusing on comfort measurement  Can continue current medication-no invasive or extensive measurement needed

## 2022-03-20 NOTE — DISCHARGE SUMMARY
[address] Hector  Discharge- [facility] 2/6/1932, 80 y o  female MRN: [id number]  Unit/Bed#: -01 Encounter: [id number]  Primary Care Provider: Gilda Hernandez MD   Date and time admitted to hospital: 3/17/2022  6:08 PM    Acute metabolic encephalopathy  Assessment & Plan  Most likely multifactorial-cystitis, medication side effect  On discharge date, patient is alert and oriented and answering question appropriately    Due to patient's other comorbid conditions, and since patient does not want to proceed with any invasive procedure or extensive measurement, patient be discharged back to nursing home with focusing on comfort mainly, may continue other medication at this time    * Generalized abdominal pain  Assessment & Plan  Presented from nursing home with abdominal pain, distension, abnormal labs  · CT abdomen renal mass 4 2 x 2 1 cm concerning for renal carcinoma with left renal vein thrombosis, moderate right pleural effusion with nodular pleural foci, right lateral abdominal wall adjacent to the liver nodules and masses, left paraspinal soft tissue heterogeneously enhancing nodule, all concerning for metastatic disease  · Suspected patient has renal cell carcinoma with metastasis to liver, lungs and spine could be the cause  · Patient is having bowel movement, tolerating p o  Lajean Cassette · Patient  does not want to proceed with any invasive procedure or extensive measurement  Son verifies and confirm it  Patient and family wish to focus on comfort at this time  Patient will be discharged back to nursing home with focusing on comfort measurement  Can continue current medication-no invasive or extensive measurement needed  Acute retention of urine  Assessment & Plan  Status post Rdemond in place-patient will be discharged with Redmond    Patient  does not want to proceed with any invasive procedure or extensive measurement  Son verifies and confirm it    Patient and family wish to focus on comfort at this time  Patient will be discharged back to nursing home with focusing on comfort measurement  Can continue current medication-no invasive or extensive measurement needed  Chronic midline low back pain with bilateral sciatica  Assessment & Plan  Radiating towards her lower extremities  Per chart review shows patient has history of spinal stenosis  Recent imaging shows suspected renal cell carcinoma with metastasis to liver, lungs and lumbar spine-which can also contribute to lower back pain and sciatica  Since patient has wish for DNR DNI and does not want to proceed with invasive procedure, and family member on bedside also agrees with patient wish, will focus on comfort care  With pain management  Pain medication adjusted  Continue other medication at this time  Patient  does not want to proceed with any invasive procedure or extensive measurement  Son verifies and confirm it  Patient and family wish to focus on comfort at this time  Patient will be discharged back to nursing home with focusing on comfort measurement  Can continue current medication-no invasive or extensive measurement needed  Renal cell carcinoma of left kidney [facility])  Assessment & Plan  Most likely based on imaging-since patient does not want to proceed any kind of invasive procedure  CT scan abdomen and pelvis shows: Left lower renal pole heterogeneous mass consistent with renal cell carcinoma until disproven measuring up to 6 4 x 6 5 cm   Thrombosis left renal vein  As per imaging suspect metastasis to liver, lungs and spine  Discuss with imaging findings with patient's family, son on bedside in details  Patient has living will which shows DNR DNI and patient does not want to proceed with any kind of invasive procedure  Just focus on comfort care  Case management on board    Continue other medication at this time    Patient  does not want to proceed with any invasive procedure or extensive measurement  Son verifies and confirm it  Patient and family wish to focus on comfort at this time  Patient will be discharged back to nursing home with focusing on comfort measurement  Can continue current medication-no invasive or extensive measurement needed  Hypercalcemia  Assessment & Plan  · Most likely secondary to underlying malignancy  · Corrected calcium 13 on admission  · Elevated alkaline phosphate likely due to possible metastasis  · Renal masses seen on CT    Patient  does not want to proceed with any invasive procedure or extensive measurement  Son verifies and confirm it  Patient and family wish to focus on comfort at this time  Patient will be discharged back to nursing home with focusing on comfort measurement  Can continue current medication-no invasive or extensive measurement needed  Anxiety  Assessment & Plan  · Outpatient P r n  Ativan, held  Patient  does not want to proceed with any invasive procedure or extensive measurement  Son verifies and confirm it  Patient and family wish to focus on comfort at this time  Patient will be discharged back to nursing home with focusing on comfort measurement  Can continue current medication-no invasive or extensive measurement needed  Iron deficiency anemia  Assessment & Plan  · Continue ferrous sulfate m,w,f  · Hemoglobin 11 7  · As per imaging, suspected patient has renal cell carcinoma with metastasis unless otherwise proven  Could be the cause of anemia  Patient  does not want to proceed with any invasive procedure or extensive measurement  Son verifies and confirm it  Patient and family wish to focus on comfort at this time  Patient will be discharged back to nursing home with focusing on comfort measurement  Can continue current medication-no invasive or extensive measurement needed        Seizure disorder [facility])  Assessment & Plan  · Continue home Keppra  · Unknown time of last seizure    Patient  does not want to proceed with any invasive procedure or extensive measurement  Son verifies and confirm it  Patient and family wish to focus on comfort at this time  Patient will be discharged back to nursing home with focusing on comfort measurement  Can continue current medication-no invasive or extensive measurement needed  Acute cystitis without hematuria  Assessment & Plan  · UA, leukocytes, moderate bacteria- start 1 g Rocephin daily  · Leukocytosis 10 admission  · Afebrile   · Patient be discharged with p o  Keflex  Patient  does not want to proceed with any invasive procedure or extensive measurement  Son verifies and confirm it  Patient and family wish to focus on comfort at this time  Patient will be discharged back to nursing home with focusing on comfort measurement  Can continue current medication-no invasive or extensive measurement needed  Essential hypertension  Assessment & Plan  · Continue lisinopril  Patient  does not want to proceed with any invasive procedure or extensive measurement  Son verifies and confirm it  Patient and family wish to focus on comfort at this time  Patient will be discharged back to nursing home with focusing on comfort measurement  Can continue current medication-no invasive or extensive measurement needed  Medical Problems             Resolved Problems  Date Reviewed: 3/17/2022    None              Discharging Physician / Practitioner: Calvin Ortega MD  PCP: Korey Martinez MD  Admission Date:   Admission Orders (From admission, onward)     Ordered        03/17/22 2030  [facility],[address] [facility]  Once                      Discharge Date: 03/20/22    Consultations During Hospital Stay:  · none    Procedures Performed:   CT abdomen pelvis with contrast    Result Date: 3/17/2022  Narrative: CT ABDOMEN AND PELVIS WITH IV CONTRAST INDICATION:   ap  COMPARISON:  None  TECHNIQUE:  CT examination of the abdomen and pelvis was performed   Axial, sagittal, and coronal 2D reformatted images were created from the source data and submitted for interpretation  Radiation dose length product (DLP) for this visit:  705 mGy-cm   This examination, like all CT scans performed in the [facility], was performed utilizing techniques to minimize radiation dose exposure, including the use of iterative reconstruction and automated exposure control  IV Contrast:  100 mL of iohexol (OMNIPAQUE) Enteric Contrast:  Enteric contrast was not administered  FINDINGS: ABDOMEN LOWER CHEST:  Moderate right and trace left pleural effusions with some associated atelectasis on the right  Right pleural effusion demonstrates some pleural nodularity as noted on series 2 image 4 and series 2 image 7 concerning for metastatic deposits  Further nodularity noted right lung base  Right basilar effusion is loculated  Incomplete assessment of a small pericardial effusion  Nodular pleural implant on the left on series 2 image 30 also concerning for metastatic disease  LIVER/BILIARY TREE:  Enlarged fatty liver  Along the lateral aspect of the hepatic lobe on series 601 image 77 there is a soft tissue mass measuring up to 4 2 x 2 1 cm worrisome for metastatic lesion in a patient with renal carcinoma  Additional smaller nodular lesions are also noted along the right hepatic lobe again concerning for metastatic lesions  GALLBLADDER:  No calcified gallstones  No pericholecystic inflammatory change  SPLEEN:  Unremarkable  PANCREAS:  Unremarkable  ADRENAL GLANDS:  Unremarkable  KIDNEYS/URETERS:  Left lower renal pole heterogeneous mass consistent with renal cell carcinoma until disproven measuring up to 6 4 x 6 5 cm  Thrombosis left renal vein best seen on series 2 image 33  STOMACH AND BOWEL:  There is prominent gaseous distention of the right colon and cecum without apparent obstruction  No free air or bowel ischemia  APPENDIX:  A normal appendix was visualized  ABDOMINOPELVIC CAVITY:  No ascites    No pneumoperitoneum  No lymphadenopathy  VESSELS:  Unremarkable for patient's age  PELVIS REPRODUCTIVE ORGANS:  Unremarkable for patient's age  URINARY BLADDER:  Redmond catheter in place  ABDOMINAL WALL/INGUINAL REGIONS:  Left lumbar paraspinal musculature heterogeneously enhancing lesion measuring up to 2 7 x 2 7 cm on series 2 image 28 also concerning for metastatic disease  OSSEOUS STRUCTURES:  No acute fracture or destructive osseous lesion  · Impression: 1  Heterogeneously enhancing left lower renal pole 4 2 x 2 1 cm mass concerning for renal cell carcinoma until disproven with associated left renal vein thrombosis  Moderate right pleural effusion with nodular pleural foci (also nodules on the left), right lateral abdominal wall adjacent to the liver nodules and masses, left paraspinal soft tissue heterogeneously enhancing nodule, all concerning for metastatic disease 2  No acute findings  The study was marked in EPIC for significant notification   Workstation performed: [id number]   ·     Significant Findings / Test Results:   Lab Results   Component Value Date    WBC 8 77 03/18/2022    HGB 11 1 (L) 03/18/2022    HCT 35 4 03/18/2022    MCV 76 (L) 03/18/2022     03/18/2022   ·   Lab Results   Component Value Date    SODIUM 133 (L) 03/18/2022    K 3 8 03/18/2022     03/18/2022    CO2 27 03/18/2022    AGAP 5 03/18/2022    BUN 12 03/18/2022    CREATININE 0 80 03/18/2022    GLUC 98 03/18/2022    CALCIUM 11 0 (H) 03/18/2022    AST 18 03/18/2022    ALT 6 (L) 03/18/2022    ALKPHOS 109 03/18/2022    TP 6 3 (L) 03/18/2022    TBILI 0 53 03/18/2022    EGFR 65 03/18/2022   ·   Collected Updated Procedure Result Status Patient Facility Result Comment    03/19/2022 1353 03/19/2022 1436 COVID/FLU/RSV [[id number]]    Nares from Nasopharyngeal Swab    Final result [facility] - copy/paste COVID Guidelines URL to browser: [email or web address]   SARS-CoV-2 assay is a Nucleic Acid Amplification assay intended for the   qualitative detection of nucleic acid from SARS-CoV-2 in nasopharyngeal   swabs  Results are for the presumptive identification of SARS-CoV-2 RNA  Positive results are indicative of infection with SARS-CoV-2, the virus   causing COVID-19, but do not rule out bacterial infection or co-infection   with other viruses  Laboratories within the [place] and its   territories are required to report all positive results to the appropriate   public health authorities  Negative results do not preclude SARS-CoV-2   infection and should not be used as the sole basis for treatment or other   patient management decisions  Negative results must be combined with   clinical observations, patient history, and epidemiological information  This test has not been FDA cleared or approved  This test has been authorized by FDA under an Emergency Use Authorization   (EUA)  This test is only authorized for the duration of time the   declaration that circumstances exist justifying the authorization of the   emergency use of an in vitro diagnostic tests for detection of SARS-CoV-2   virus and/or diagnosis of COVID-19 infection under section 564(b)(1) of   the Act, 21 U  S C  777RQO-2(Y)(9), unless the authorization is terminated   or revoked sooner  The test has been validated but independent review by FDA   and CLIA is pending  Test performed using [employer]: This RT-PCR assay targets N2,   a region unique to SARS-CoV-2  A conserved region in the E-gene was chosen   for pan-Sarbecovirus detection which includes SARS-CoV-2      Component Value   SARS-CoV-2 Negative   INFLUENZA A PCR Negative   INFLUENZA B PCR Negative   RSV PCR Negative           03/17/2022 1924 03/19/2022 1055 Urine culture [[id number]]    (Abnormal)   Urine, Indwelling Redmond Catheter    Final result [facility] [address] [place] [address]  Component Value   Urine Culture >100,000 cfu/ml Aerococcus urinae Abnormal     Aerococcus urinae has been described as typically being susceptible to most Penicillins, Cephalosporins, and nitrofurantoin  Activity is variable with Fluoroquinolones and poor with Sulfonamides  Susceptibility is not normally performed on this organism  Susceptibility      Aerococcus urinae (1)    Antibiotic Interpretation Microscan Method Status    ZID Performed  Yes CARL Final             ·     Incidental Findings:   · As mention in CT scan of abdomen and pelvis     Test Results Pending at Discharge (will require follow up): · None     Outpatient Tests Requested:  · None    Complications:  None    Reason for Admission:  Abdominal pain, distension abnormal labs    Hospital Course:   Lavera Koyanagi is a 80 y o  female patient who originally presented to the hospital on 3/17/2022 due to abdominal pain, distention and abnormal labs  Patient to from nursing home with abdominal pain and distension, initially was thought bowel obstruction  Patient admitted under the diagnosis of generalized abdominal pain, acute cystitis without hematuria  Patient placed on conservative management, also placed on IV ceftriaxone due to cystitis  Later on imaging shows abnormal results which shows suspected renal cancer with metastases to liver lungs and lumbar spine  Since patient does not want to proceed with any invasive or extensive measurement, no further test or procedure ordered  Discussed the results in details with patient and her son on the bedside, as per son and patient, does not want to proceed with any kind of invasive for extensive measurement, patient's son verifies the which and confirm  At this time, patient and family prefers to focus on comfort measurement with continue current medications    No extensive measurement or invasive procedure needed at this time since patient does not want to proceed  Patient also found acute metabolic encephalopathy most likely UTI and medication side effect  Patient received IV antibiotic  With the treatment, patient's condition improved on discharge date, patient is alert and oriented  Patient has distended abdomen, abdominal x-ray done which shows no significant change  Patient is having bowel movement, tolerating p o  Please see above list of diagnoses and related plan for additional information  Condition at Discharge: stable    Discharge Day Visit / Exam:   Subjective:  Seen and evaluated during the round  Patient is answering question appropriately  Complaining of on of abdominal pain which comes and goes and varies with position  Denies any short of breath  Reports she ate some breakfast and had bowel movement  Vitals: Blood Pressure: (!) 197/94 (03/20/22 0635)  Pulse: 87 (03/20/22 0635)  Temperature: 97 9 °F (36 6 °C) (03/20/22 0635)  Temp Source: Oral (03/19/22 2215)  Respirations: 17 (03/20/22 0635)  Height: 5' 3" (160 cm) (03/17/22 1838)  Weight - Scale: 69 5 kg (153 lb 3 5 oz) (03/17/22 1838)  SpO2: 95 % (03/20/22 5405)  Exam:   Physical Exam  Vitals and nursing note reviewed  Exam conducted with a chaperone present  Constitutional:       Appearance: She is obese  HENT:      Nose: No congestion  Mouth/Throat:      Mouth: Mucous membranes are moist       Pharynx: No oropharyngeal exudate  Eyes:      General: No scleral icterus  Conjunctiva/sclera: Conjunctivae normal       Pupils: Pupils are equal, round, and reactive to light  Cardiovascular:      Rate and Rhythm: Normal rate  Heart sounds: No murmur heard  No friction rub  No gallop  Pulmonary:      Effort: Pulmonary effort is normal  No respiratory distress  Breath sounds: No stridor  No wheezing or rhonchi  Abdominal:      General: Abdomen is flat  Bowel sounds are normal  There is distension  Palpations: There is no mass  Tenderness:  There is no abdominal tenderness  There is no guarding  Musculoskeletal:         General: Normal range of motion  Cervical back: Normal range of motion  Right lower leg: No edema  Lymphadenopathy:      Cervical: No cervical adenopathy  Skin:     General: Skin is warm  Capillary Refill: Capillary refill takes less than 2 seconds  Findings: No lesion  Neurological:      General: No focal deficit present  Mental Status: She is alert and oriented to person, place, and time  Cranial Nerves: No cranial nerve deficit  Sensory: No sensory deficit  Motor: No weakness  Coordination: Coordination normal    Psychiatric:         Mood and Affect: Mood normal          Discussion with Family: Updated  (son) via phone  Discharge instructions/Information to patient and family:   See after visit summary for information provided to patient and family  Provisions for Follow-Up Care:  See after visit summary for information related to follow-up care and any pertinent home health orders  Disposition:   [facility]    Planned Readmission:  Patient and her family wants to focus on comfort at this time  No need for hospital readmission  Discharge Statement:  I spent >35 minutes discharging the patient  This time was spent on the day of discharge  I had direct contact with the patient on the day of discharge  Greater than 50% of the total time was spent examining patient, answering all patient questions, arranging and discussing plan of care with patient as well as directly providing post-discharge instructions  Additional time then spent on discharge activities  Discharge Medications:  See after visit summary for reconciled discharge medications provided to patient and/or family        **Please Note: This note may have been constructed using a voice recognition system**

## 2022-03-20 NOTE — ASSESSMENT & PLAN NOTE
Most likely based on imaging-since patient does not want to proceed any kind of invasive procedure  CT scan abdomen and pelvis shows: Left lower renal pole heterogeneous mass consistent with renal cell carcinoma until disproven measuring up to 6 4 x 6 5 cm   Thrombosis left renal vein  As per imaging suspect metastasis to liver, lungs and spine  Discuss with imaging findings with patient's family, son on bedside in details  Patient has living will which shows DNR DNI and patient does not want to proceed with any kind of invasive procedure  Just focus on comfort care  Case management on board  Continue other medication at this time    Patient  does not want to proceed with any invasive procedure or extensive measurement  Son verifies and confirm it  Patient and family wish to focus on comfort at this time  Patient will be discharged back to nursing home with focusing on comfort measurement  Can continue current medication-no invasive or extensive measurement needed

## 2022-03-20 NOTE — NURSING NOTE
Peripheral IV removed and tolerated well  AVS printed and [facility] called and report given to [facility]  Belongings reviewed with family and discharged also reviewed with them as well  Scripts for Lorazepam and Percocet sent with transport  Transport arriving at 1300

## 2022-03-20 NOTE — ASSESSMENT & PLAN NOTE
· Continue lisinopril  Patient  does not want to proceed with any invasive procedure or extensive measurement  Son verifies and confirm it  Patient and family wish to focus on comfort at this time  Patient will be discharged back to nursing home with focusing on comfort measurement  Can continue current medication-no invasive or extensive measurement needed

## 2022-03-20 NOTE — OCCUPATIONAL THERAPY NOTE
Occupational Therapy Screen         Patient Name: Regis Aldana  [id number] Date: 3/20/2022  Problem List  Principal Problem:    Generalized abdominal pain  Active Problems:    Essential hypertension    Acute cystitis without hematuria    Seizure disorder (HCC)    Iron deficiency anemia    Anxiety    Hypercalcemia    Renal cell carcinoma of left kidney (HCC)    Chronic midline low back pain with bilateral sciatica          03/20/22 0815   Note Type   Note type Screen         OT orders received  Chart review completed  PT admitted to [address] [place] [address] 3/17/2022 with Dx: generalized abdominal pain  Pt at this time with plans to transition to comfort care  Pt is not appropriate for acute OT services at this time  D/c OT effective 3/20/2022  If new concerns arise, please re-consult         CARLI Leavitt/JACQUI

## 2022-03-20 NOTE — ASSESSMENT & PLAN NOTE
· Continue home Keppra  · Unknown time of last seizure    Patient  does not want to proceed with any invasive procedure or extensive measurement  Son verifies and confirm it  Patient and family wish to focus on comfort at this time  Patient will be discharged back to nursing home with focusing on comfort measurement  Can continue current medication-no invasive or extensive measurement needed

## 2022-03-20 NOTE — ASSESSMENT & PLAN NOTE
· Continue ferrous sulfate m,w,f  · Hemoglobin 11 7  · As per imaging, suspected patient has renal cell carcinoma with metastasis unless otherwise proven  Could be the cause of anemia  Patient  does not want to proceed with any invasive procedure or extensive measurement  Son verifies and confirm it  Patient and family wish to focus on comfort at this time  Patient will be discharged back to nursing home with focusing on comfort measurement  Can continue current medication-no invasive or extensive measurement needed

## 2022-03-20 NOTE — PLAN OF CARE
Problem: Potential for Falls  Goal: Patient will remain free of falls  Description: INTERVENTIONS:  - Educate patient/family on patient safety including physical limitations  - Instruct patient to call for assistance with activity   - Consult OT/PT to assist with strengthening/mobility   - Keep Call bell within reach  - Keep bed low and locked with side rails adjusted as appropriate  - Keep care items and personal belongings within reach  - Initiate and maintain comfort rounds  - Make Fall Risk Sign visible to staff  - Offer Toileting every   Hours, in advance of need  - Initiate/Maintain   alarm  - Obtain necessary fall risk management equipment:   - Apply yellow socks and bracelet for high fall risk patients  - Consider moving patient to room near nurses station  Outcome: Progressing     Problem: MOBILITY - ADULT  Goal: Maintain or return to baseline ADL function  Description: INTERVENTIONS:  -  Assess patient's ability to carry out ADLs; assess patient's baseline for ADL function and identify physical deficits which impact ability to perform ADLs (bathing, care of mouth/teeth, toileting, grooming, dressing, etc )  - Assess/evaluate cause of self-care deficits   - Assess range of motion  - Assess patient's mobility; develop plan if impaired  - Assess patient's need for assistive devices and provide as appropriate  - Encourage maximum independence but intervene and supervise when necessary  - Involve family in performance of ADLs  - Assess for home care needs following discharge   - Consider OT consult to assist with ADL evaluation and planning for discharge  - Provide patient education as appropriate  Outcome: Progressing  Goal: Maintains/Returns to pre admission functional level  Description: INTERVENTIONS:  - Perform BMAT or MOVE assessment daily    - Set and communicate daily mobility goal to care team and patient/family/caregiver     - Collaborate with rehabilitation services on mobility goals if consulted  - Perform Range of Motion   times a day  - Reposition patient every   hours  - Dangle patient   times a day  - Stand patient   times a day  - Ambulate patient   times a day  - Out of bed to chair   times a day   - Out of bed for meals    times a day  - Out of bed for toileting  - Record patient progress and toleration of activity level   Outcome: Progressing     Problem: PAIN - ADULT  Goal: Verbalizes/displays adequate comfort level or baseline comfort level  Description: Interventions:  - Encourage patient to monitor pain and request assistance  - Assess pain using appropriate pain scale  - Administer analgesics based on type and severity of pain and evaluate response  - Implement non-pharmacological measures as appropriate and evaluate response  - Consider cultural and social influences on pain and pain management  - Notify physician/advanced practitioner if interventions unsuccessful or patient reports new pain  Outcome: Progressing     Problem: INFECTION - ADULT  Goal: Absence or prevention of progression during hospitalization  Description: INTERVENTIONS:  - Assess and monitor for signs and symptoms of infection  - Monitor lab/diagnostic results  - Monitor all insertion sites, i e  indwelling lines, tubes, and drains  - Monitor endotracheal if appropriate and nasal secretions for changes in amount and color  - [place] appropriate cooling/warming therapies per order  - Administer medications as ordered  - Instruct and encourage patient and family to use good hand hygiene technique  - Identify and instruct in appropriate isolation precautions for identified infection/condition  Outcome: Progressing  Goal: Absence of fever/infection during neutropenic period  Description: INTERVENTIONS:  - Monitor WBC    Outcome: Progressing     Problem: SAFETY ADULT  Goal: Patient will remain free of falls  Description: INTERVENTIONS:  - Educate patient/family on patient safety including physical limitations  - Instruct patient to call for assistance with activity   - Consult OT/PT to assist with strengthening/mobility   - Keep Call bell within reach  - Keep bed low and locked with side rails adjusted as appropriate  - Keep care items and personal belongings within reach  - Initiate and maintain comfort rounds  - Make Fall Risk Sign visible to staff  - Offer Toileting every   Hours, in advance of need  - Initiate/Maintain   alarm  - Obtain necessary fall risk management equipment:   - Apply yellow socks and bracelet for high fall risk patients  - Consider moving patient to room near nurses station  Outcome: Progressing  Goal: Maintain or return to baseline ADL function  Description: INTERVENTIONS:  -  Assess patient's ability to carry out ADLs; assess patient's baseline for ADL function and identify physical deficits which impact ability to perform ADLs (bathing, care of mouth/teeth, toileting, grooming, dressing, etc )  - Assess/evaluate cause of self-care deficits   - Assess range of motion  - Assess patient's mobility; develop plan if impaired  - Assess patient's need for assistive devices and provide as appropriate  - Encourage maximum independence but intervene and supervise when necessary  - Involve family in performance of ADLs  - Assess for home care needs following discharge   - Consider OT consult to assist with ADL evaluation and planning for discharge  - Provide patient education as appropriate  Outcome: Progressing  Goal: Maintains/Returns to pre admission functional level  Description: INTERVENTIONS:  - Perform BMAT or MOVE assessment daily    - Set and communicate daily mobility goal to care team and patient/family/caregiver  - Collaborate with rehabilitation services on mobility goals if consulted  - Perform Range of Motion   times a day  - Reposition patient every   hours  - Dangle patient   times a day  - Stand patient   times a day  - Ambulate patient   times a day  - Out of bed to chair    times a day   - Out of bed for meals   times a day  - Out of bed for toileting  - Record patient progress and toleration of activity level   Outcome: Progressing     Problem: DISCHARGE PLANNING  Goal: Discharge to home or other facility with appropriate resources  Description: INTERVENTIONS:  - Identify barriers to discharge w/patient and caregiver  - Arrange for needed discharge resources and transportation as appropriate  - Identify discharge learning needs (meds, wound care, etc )  - Arrange for interpretive services to assist at discharge as needed  - Refer to Case Management Department for coordinating discharge planning if the patient needs post-hospital services based on physician/advanced practitioner order or complex needs related to functional status, cognitive ability, or social support system  Outcome: Progressing     Problem: Knowledge Deficit  Goal: Patient/family/caregiver demonstrates understanding of disease process, treatment plan, medications, and discharge instructions  Description: Complete learning assessment and assess knowledge base    Interventions:  - Provide teaching at level of understanding  - Provide teaching via preferred learning methods  Outcome: Progressing     Problem: DISCHARGE PLANNING - CARE MANAGEMENT  Goal: Discharge to post-acute care or home with appropriate resources  Description: INTERVENTIONS:  - Conduct assessment to determine patient/family and health care team treatment goals, and need for post-acute services based on payer coverage, community resources, and patient preferences, and barriers to discharge  - Address psychosocial, clinical, and financial barriers to discharge as identified in assessment in conjunction with the patient/family and health care team  - Arrange appropriate level of post-acute services according to patients   needs and preference and payer coverage in collaboration with the physician and health care team  - Communicate with and update the patient/family, physician, and health care team regarding progress on the discharge plan  - Arrange appropriate transportation to post-acute venues  Outcome: Progressing     Problem: Prexisting or High Potential for Compromised Skin Integrity  Goal: Skin integrity is maintai valencia or improved  Description: INTERVENTIONS:  - Identify patients at risk for skin breakdown  - Assess and monitor skin integrity  - Assess and monitor nutrition and hydration status  - Monitor labs   - Assess for incontinence   - Turn and reposition patient  - Assist with mobility/ambulation  - Relieve pressure over bony prominences  - Avoid friction and shearing  - Provide appropriate hygiene as needed including keeping skin clean and dry  - Evaluate need for skin moisturizer/barrier cream  - Collaborate with interdisciplinary team   - Patient/family teaching  - Consider wound care consult   Outcome: Progressing

## 2022-03-21 NOTE — QUICK NOTE
Discussed the x-ray reports with patient's son, Prince condon over the phone  Patient sent back to nursing home with focusing on her comfort as per patient's wish which was confirmed by patient's family on bedside during the hospitalization  Discussed in details regarding comfort measurements-which means to focus on patient's comfort, to keep patient pain-free, try to provide some sleep, keep patient anxiety free- for that, we medical professional use Pain medication, Anxiolytics  All other medications sirs secondary  Patient's son verbalizes to understand and agrees

## 2022-03-21 NOTE — UTILIZATION REVIEW
Notification of Discharge   This is a Notification of Discharge from our facility [address]  Please be advised that this patient has been discharge from our facility  Below you will find the admission and discharge date and time including the patients disposition  UTILIZATION REVIEW CONTACT:  Marc Warren  Utilization   Network Utilization Review Department  Phone: [phone] x carefully listen to the prompts  All voicemails are confidential   Email: [email or web address]     PHYSICIAN ADVISORY SERVICES:  FOR UANY-ZC-XANL REVIEW - MEDICAL NECESSITY DENIAL  Phone: [phone]  Fax: [phone]  Email: [email or web address]     PRESENTATION DATE: 3/17/2022  6:08 PM  OBERVATION ADMISSION DATE:   INPATIENT ADMISSION DATE: 3/17/22  8:30 PM   DISCHARGE DATE: 3/20/2022  2:20 PM  DISPOSITION: Non [facility] SN[facility]/TCU/SNU Non [facility] SNF/TCU/SNU      IMPORTANT INFORMATION:  Send all requests for admission clinical reviews, approved or denied determinations and any other requests to dedicated fax number below belonging to the campus where the patient is receiving treatment   List of dedicated fax numbers:  [address] [place] [address] DENIALS (Administrative/Medical Necessity) [phone]   [address]  (Maternity/NICU/Pediatrics) [phone]   [facility] [phone]   [address] [facility] [phone]   [address] [place] [address] [phone]   [address] [place] [address],[address] [phone]   [facility]  [phone]   [address] [facility]  [address] [facility] [address] [facility] [phone]

## 2023-04-03 NOTE — ASSESSMENT & PLAN NOTE
· Presents with sodium of 132, likely secondary to poor po intake  · Improving with increased po intake at 134 Bilateral legs